# Patient Record
Sex: FEMALE | Race: BLACK OR AFRICAN AMERICAN | NOT HISPANIC OR LATINO | Employment: PART TIME | URBAN - METROPOLITAN AREA
[De-identification: names, ages, dates, MRNs, and addresses within clinical notes are randomized per-mention and may not be internally consistent; named-entity substitution may affect disease eponyms.]

---
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
## 2024-05-06 ENCOUNTER — HOSPITAL ENCOUNTER (EMERGENCY)
Facility: HOSPITAL | Age: 19
Discharge: HOME | End: 2024-05-06
Attending: EMERGENCY MEDICINE
Payer: MEDICAID

## 2024-05-06 VITALS
TEMPERATURE: 98 F | DIASTOLIC BLOOD PRESSURE: 63 MMHG | RESPIRATION RATE: 20 BRPM | WEIGHT: 136.13 LBS | HEART RATE: 88 BPM | SYSTOLIC BLOOD PRESSURE: 120 MMHG | OXYGEN SATURATION: 100 % | BODY MASS INDEX: 29.37 KG/M2 | HEIGHT: 57 IN

## 2024-05-06 DIAGNOSIS — R07.9 CHEST PAIN: ICD-10-CM

## 2024-05-06 LAB
ALBUMIN SERPL BCP-MCNC: 3.8 G/DL (ref 3.5–5.2)
ALP SERPL-CCNC: 91 U/L (ref 55–135)
ALT SERPL W/O P-5'-P-CCNC: 12 U/L (ref 10–44)
ANION GAP SERPL CALC-SCNC: 12 MMOL/L (ref 8–16)
AST SERPL-CCNC: 16 U/L (ref 10–40)
B-HCG UR QL: NEGATIVE
BACTERIA #/AREA URNS AUTO: NORMAL /HPF
BASOPHILS # BLD AUTO: 0.03 K/UL (ref 0–0.2)
BASOPHILS NFR BLD: 0.3 % (ref 0–1.9)
BILIRUB SERPL-MCNC: 0.4 MG/DL (ref 0.1–1)
BILIRUB UR QL STRIP: NEGATIVE
BNP SERPL-MCNC: <10 PG/ML (ref 0–99)
BUN SERPL-MCNC: 10 MG/DL (ref 6–20)
CALCIUM SERPL-MCNC: 9.4 MG/DL (ref 8.7–10.5)
CHLORIDE SERPL-SCNC: 105 MMOL/L (ref 95–110)
CLARITY UR REFRACT.AUTO: ABNORMAL
CO2 SERPL-SCNC: 21 MMOL/L (ref 23–29)
COLOR UR AUTO: YELLOW
CREAT SERPL-MCNC: 0.7 MG/DL (ref 0.5–1.4)
D DIMER PPP IA.FEU-MCNC: 0.36 MG/L FEU
DIFFERENTIAL METHOD BLD: ABNORMAL
EOSINOPHIL # BLD AUTO: 0.2 K/UL (ref 0–0.5)
EOSINOPHIL NFR BLD: 1.8 % (ref 0–8)
ERYTHROCYTE [DISTWIDTH] IN BLOOD BY AUTOMATED COUNT: 13.7 % (ref 11.5–14.5)
EST. GFR  (NO RACE VARIABLE): >60 ML/MIN/1.73 M^2
GLUCOSE SERPL-MCNC: 109 MG/DL (ref 70–110)
GLUCOSE UR QL STRIP: NEGATIVE
HCT VFR BLD AUTO: 38.4 % (ref 37–48.5)
HGB BLD-MCNC: 12 G/DL (ref 12–16)
HGB UR QL STRIP: ABNORMAL
IMM GRANULOCYTES # BLD AUTO: 0.06 K/UL (ref 0–0.04)
IMM GRANULOCYTES NFR BLD AUTO: 0.5 % (ref 0–0.5)
KETONES UR QL STRIP: NEGATIVE
LEUKOCYTE ESTERASE UR QL STRIP: NEGATIVE
LYMPHOCYTES # BLD AUTO: 2.1 K/UL (ref 1–4.8)
LYMPHOCYTES NFR BLD: 19.4 % (ref 18–48)
MCH RBC QN AUTO: 25.6 PG (ref 27–31)
MCHC RBC AUTO-ENTMCNC: 31.3 G/DL (ref 32–36)
MCV RBC AUTO: 82 FL (ref 82–98)
MICROSCOPIC COMMENT: NORMAL
MONOCYTES # BLD AUTO: 1.2 K/UL (ref 0.3–1)
MONOCYTES NFR BLD: 11.3 % (ref 4–15)
NEUTROPHILS # BLD AUTO: 7.3 K/UL (ref 1.8–7.7)
NEUTROPHILS NFR BLD: 66.7 % (ref 38–73)
NITRITE UR QL STRIP: NEGATIVE
NRBC BLD-RTO: 0 /100 WBC
PH UR STRIP: 6 [PH] (ref 5–8)
PLATELET # BLD AUTO: 366 K/UL (ref 150–450)
PMV BLD AUTO: 10.1 FL (ref 9.2–12.9)
POTASSIUM SERPL-SCNC: 4.1 MMOL/L (ref 3.5–5.1)
PROT SERPL-MCNC: 8 G/DL (ref 6–8.4)
PROT UR QL STRIP: NEGATIVE
RBC # BLD AUTO: 4.69 M/UL (ref 4–5.4)
RBC #/AREA URNS AUTO: 2 /HPF (ref 0–4)
SODIUM SERPL-SCNC: 138 MMOL/L (ref 136–145)
SP GR UR STRIP: 1.02 (ref 1–1.03)
TROPONIN I SERPL DL<=0.01 NG/ML-MCNC: <0.006 NG/ML (ref 0–0.03)
TSH SERPL DL<=0.005 MIU/L-ACNC: 3.48 UIU/ML (ref 0.4–4)
URN SPEC COLLECT METH UR: ABNORMAL
UROBILINOGEN UR STRIP-ACNC: NEGATIVE EU/DL
WBC # BLD AUTO: 10.97 K/UL (ref 3.9–12.7)
WBC #/AREA URNS AUTO: 3 /HPF (ref 0–5)

## 2024-05-06 PROCEDURE — 84443 ASSAY THYROID STIM HORMONE: CPT | Mod: ER | Performed by: EMERGENCY MEDICINE

## 2024-05-06 PROCEDURE — 80053 COMPREHEN METABOLIC PANEL: CPT | Mod: ER | Performed by: EMERGENCY MEDICINE

## 2024-05-06 PROCEDURE — 85379 FIBRIN DEGRADATION QUANT: CPT | Mod: ER | Performed by: EMERGENCY MEDICINE

## 2024-05-06 PROCEDURE — 85025 COMPLETE CBC W/AUTO DIFF WBC: CPT | Mod: ER | Performed by: EMERGENCY MEDICINE

## 2024-05-06 PROCEDURE — 83880 ASSAY OF NATRIURETIC PEPTIDE: CPT | Mod: ER | Performed by: EMERGENCY MEDICINE

## 2024-05-06 PROCEDURE — 81000 URINALYSIS NONAUTO W/SCOPE: CPT | Mod: ER | Performed by: EMERGENCY MEDICINE

## 2024-05-06 PROCEDURE — 84484 ASSAY OF TROPONIN QUANT: CPT | Mod: ER | Performed by: EMERGENCY MEDICINE

## 2024-05-06 PROCEDURE — 81025 URINE PREGNANCY TEST: CPT | Mod: ER | Performed by: EMERGENCY MEDICINE

## 2024-05-06 PROCEDURE — 93010 ELECTROCARDIOGRAM REPORT: CPT | Mod: ,,, | Performed by: STUDENT IN AN ORGANIZED HEALTH CARE EDUCATION/TRAINING PROGRAM

## 2024-05-06 PROCEDURE — 99285 EMERGENCY DEPT VISIT HI MDM: CPT | Mod: 25,ER

## 2024-05-06 PROCEDURE — 93005 ELECTROCARDIOGRAM TRACING: CPT | Mod: ER

## 2024-05-06 PROCEDURE — 94761 N-INVAS EAR/PLS OXIMETRY MLT: CPT | Mod: ER

## 2024-05-06 NOTE — ED PROVIDER NOTES
Emergency Medicine Provider Note - 5/6/2024       History     Chief Complaint   Patient presents with    Chest Pain     Approx. 7:45 this am lasting approx. 1 min sharp pain left side then resolved and then again at 7:50 last only sec. Has had this problem for several years. Just wants to know what is causing these pains       Allergies:  Review of patient's allergies indicates:   Allergen Reactions    Excedrin ib      Eye swelling    Penicillins Hives, Itching, Rash and Swelling        History of Present Illness   HPI    5/6/2024, 8:14 AM  The history is provided by the patient    Natalia Sue is a 19 y.o. female presenting to the ED for chest pain.  Patient reports that she has been having chest pain since elementary school.  She states that the chest pain happened today at 7:44 a.m..  She was in her recliner using her phone.  She noticed that the right side of her chest was throbbing.  She then had sharp stabbing pain on the left side of the chest.  It lasted about a minute, waxing and waning in intensity.  It was not associated with any diaphoresis, nausea, vomiting, or shortness of breath.  Patient denies any history of PE, DVT.  Patient got off of her birth control pill in February.  Patient denies any calf pain, calf swelling.  Patient denies any recent upper respiratory infections, sore throat, cough, fever.  It was not associated with any nausea, vomiting.      Arrival mode: Private Vehicle     PCP: Pati Canseco RN     Past Medical History:  Past Medical History:   Diagnosis Date    Bronchitis        Past Surgical History:  No past surgical history on file.      Family History:  No family history on file.    Social History:  Social History     Tobacco Use    Smoking status: Former     Types: Cigarettes     Passive exposure: Past    Smokeless tobacco: Never   Substance and Sexual Activity    Alcohol use: Yes    Drug use: Never    Sexual activity: Not on file        Review of Systems   Review of  Systems   Constitutional:  Negative for fever.   Respiratory:  Negative for cough and shortness of breath.    Cardiovascular:  Positive for chest pain and palpitations. Negative for leg swelling.   Gastrointestinal:  Negative for nausea and vomiting.   Genitourinary:  Negative for dysuria.   Musculoskeletal:  Negative for back pain.   Skin:  Negative for rash.   Neurological:  Negative for weakness.   Hematological:  Does not bruise/bleed easily.      Physical Exam     Initial Vitals [05/06/24 0810]   BP Pulse Resp Temp SpO2   124/70 92 20 97.5 °F (36.4 °C) 100 %      MAP       --          Physical Exam    Nursing Notes and Vital Signs Reviewed.  Constitutional: Patient is in no apparent distress. Well-developed and well-nourished.  Head: Atraumatic. Normocephalic.  Eyes: PERRL. EOM intact. Conjunctivae are not pale. No scleral icterus.  ENT: Mucous membranes are moist. Oropharynx is clear and symmetric.    Neck: Supple. Full ROM. No lymphadenopathy.  Cardiovascular: Regular rate. Regular rhythm. No murmurs, rubs, or gallops. Distal pulses are 2+ and symmetric.  Pulmonary/Chest: No respiratory distress. Clear to auscultation bilaterally. No wheezing or rales.  Abdominal: Soft and non-distended.  There is no tenderness.  No rebound, guarding, or rigidity. Good bowel sounds.  Genitourinary: No CVA tenderness  Musculoskeletal: Moves all extremities. No obvious deformities. No edema. No calf tenderness.  Skin: Warm and dry.  Neurological:  Alert, awake, and appropriate.  Normal speech.  No acute focal neurological deficits are appreciated.  Psychiatric: Normal affect. Good eye contact. Appropriate in content.     ED Course   ED Procedures:  Procedures    ED Vital Signs:  Vitals:    05/06/24 0810 05/06/24 0851 05/06/24 0926 05/06/24 0933   BP: 124/70 (!) 117/59  120/63   Pulse: 92 90  88   Resp: 20 20 20    Temp: 97.5 °F (36.4 °C)      TempSrc: Oral      SpO2: 100% 100%  100%   Weight: 61.7 kg (136 lb 2.1 oz)     "  Height: 4' 9" (1.448 m)          Abnormal Lab Results:  Labs Reviewed   CBC W/ AUTO DIFFERENTIAL - Abnormal; Notable for the following components:       Result Value    MCH 25.6 (*)     MCHC 31.3 (*)     Immature Grans (Abs) 0.06 (*)     Mono # 1.2 (*)     All other components within normal limits   COMPREHENSIVE METABOLIC PANEL - Abnormal; Notable for the following components:    CO2 21 (*)     All other components within normal limits   URINALYSIS, REFLEX TO URINE CULTURE - Abnormal; Notable for the following components:    Appearance, UA Hazy (*)     Occult Blood UA 2+ (*)     All other components within normal limits    Narrative:     Specimen Source->Urine   TROPONIN I   B-TYPE NATRIURETIC PEPTIDE   PREGNANCY TEST, URINE RAPID    Narrative:     Specimen Source->Urine   D DIMER, QUANTITATIVE   TSH   URINALYSIS MICROSCOPIC    Narrative:     Specimen Source->Urine        All Lab Results:  Results for orders placed or performed during the hospital encounter of 05/06/24   CBC auto differential   Result Value Ref Range    WBC 10.97 3.90 - 12.70 K/uL    RBC 4.69 4.00 - 5.40 M/uL    Hemoglobin 12.0 12.0 - 16.0 g/dL    Hematocrit 38.4 37.0 - 48.5 %    MCV 82 82 - 98 fL    MCH 25.6 (L) 27.0 - 31.0 pg    MCHC 31.3 (L) 32.0 - 36.0 g/dL    RDW 13.7 11.5 - 14.5 %    Platelets 366 150 - 450 K/uL    MPV 10.1 9.2 - 12.9 fL    Immature Granulocytes 0.5 0.0 - 0.5 %    Gran # (ANC) 7.3 1.8 - 7.7 K/uL    Immature Grans (Abs) 0.06 (H) 0.00 - 0.04 K/uL    Lymph # 2.1 1.0 - 4.8 K/uL    Mono # 1.2 (H) 0.3 - 1.0 K/uL    Eos # 0.2 0.0 - 0.5 K/uL    Baso # 0.03 0.00 - 0.20 K/uL    nRBC 0 0 /100 WBC    Gran % 66.7 38.0 - 73.0 %    Lymph % 19.4 18.0 - 48.0 %    Mono % 11.3 4.0 - 15.0 %    Eosinophil % 1.8 0.0 - 8.0 %    Basophil % 0.3 0.0 - 1.9 %    Differential Method Automated    Comprehensive metabolic panel   Result Value Ref Range    Sodium 138 136 - 145 mmol/L    Potassium 4.1 3.5 - 5.1 mmol/L    Chloride 105 95 - 110 mmol/L    CO2 " 21 (L) 23 - 29 mmol/L    Glucose 109 70 - 110 mg/dL    BUN 10 6 - 20 mg/dL    Creatinine 0.7 0.5 - 1.4 mg/dL    Calcium 9.4 8.7 - 10.5 mg/dL    Total Protein 8.0 6.0 - 8.4 g/dL    Albumin 3.8 3.5 - 5.2 g/dL    Total Bilirubin 0.4 0.1 - 1.0 mg/dL    Alkaline Phosphatase 91 55 - 135 U/L    AST 16 10 - 40 U/L    ALT 12 10 - 44 U/L    eGFR >60.0 >60 mL/min/1.73 m^2    Anion Gap 12 8 - 16 mmol/L   Troponin I #1   Result Value Ref Range    Troponin I <0.006 0.000 - 0.026 ng/mL   BNP   Result Value Ref Range    BNP <10 0 - 99 pg/mL   Urinalysis, Reflex to Urine Culture Urine, Clean Catch    Specimen: Urine   Result Value Ref Range    Specimen UA Urine, Clean Catch     Color, UA Yellow Yellow, Straw, Lali    Appearance, UA Hazy (A) Clear    pH, UA 6.0 5.0 - 8.0    Specific Gravity, UA 1.025 1.005 - 1.030    Protein, UA Negative Negative    Glucose, UA Negative Negative    Ketones, UA Negative Negative    Bilirubin (UA) Negative Negative    Occult Blood UA 2+ (A) Negative    Nitrite, UA Negative Negative    Urobilinogen, UA Negative <2.0 EU/dL    Leukocytes, UA Negative Negative   Rapid Pregnancy, Urine   Result Value Ref Range    Preg Test, Ur Negative    D-Dimer, Quantitative   Result Value Ref Range    D-Dimer 0.36 <0.50 mg/L FEU   TSH   Result Value Ref Range    TSH 3.482 0.400 - 4.000 uIU/mL   Urinalysis Microscopic   Result Value Ref Range    RBC, UA 2 0 - 4 /hpf    WBC, UA 3 0 - 5 /hpf    Bacteria Occasional None-Occ /hpf    Microscopic Comment SEE COMMENT        The EKG was ordered, reviewed, and independently interpreted by the ED provider:    ECG Results              EKG 12-lead (Preliminary result)  Result time 05/06/24 08:54:04      Wet Read by Deirdre Hendrix DO (05/06/24 08:54:04, [facility] - Emergency Dept, Emergency Medicine)    Rate of 81 beats per minute.  Sinus rhythm.  Sinus arrhythmia.  Nonspecific T-wave changes.  No STEMI.  No delta wave                                    Imaging Results:  Imaging  Results              X-Ray Chest PA And Lateral (Final result)  Result time 05/06/24 09:49:02      Final result by Farzad Mesa MD (05/06/24 09:49:02)                   Impression:      No acute findings.      Electronically signed by: Farzad Mesa MD  Date:    05/06/2024  Time:    09:49               Narrative:    EXAMINATION:  XR CHEST PA AND LATERAL    CLINICAL HISTORY  Acute left-sided chest pain.,    COMPARISON:  04/23/2023 x-ray.    FINDINGS:  The heart size is normal.  The lung fields are clear.  No acute cardiopulmonary infiltrate.                                       Radiology Procedure Done: AP & Lat CXR.  Interpretation: No pneumothorax.  No infiltrate.  Normal cardiac silhouette size          The Emergency Provider reviewed the vital signs and test results, which are outlined above.     ED Discussion   ED Medication(s):  Medications - No data to display    ED Course as of 05/06/24 1533   Mon May 06, 2024   0931 D-Dimer: 0.36 [LB]   0931 WBC: 10.97 [LB]   0931 Hemoglobin: 12.0 [LB]   0931 Hematocrit: 38.4 [LB]   1018 Patient updated on results of tests.  All questions answered.    Regarding CHEST PAIN, I advised the patient that chest pain can be caused by a range of conditions, from not serious to life-threatening such as: heart attack or a blood clot in your lungs, angina indicating poor blood flow to the heart; infection, inflammation, or a fracture in the bones or cartilage in chest wall; a digestive problem, such as acid reflux or a stomach ulcer; or even an anxiety attack.  Instructed patient to follow up with primary healthcare provider for reevaluation and possible diagnostic studies to find the actual cause of the chest pain. Patient was instructed to call 911 or go to the nearest emergency department if they develop any of the following signs of a heart attack: squeezing, pressure, or pain in the chest that lasts longer than five minutes or returns; discomfort or pain in the back,  neck, jaw, stomach, or arm; trouble breathing; nausea or vomiting; lightheadedness;  or a sudden cold sweat, especially with chest pain or trouble breathing.  Also return to the emergency department the chest discomfort gets worse (even with medicine); cough or vomit blood; have bowel movements that are black or bloody; cannot stop vomiting; or develop difficulty swallowing.     [LB]      ED Course User Index  [LB] Deirdre Hendrix, DO            10:18  Reassessment: Dr. Hendrix reassessed the pt.  The pt is resting comfortably and is NAD.  Pt states their sx have improved. Discussed test results, shared treatment plan, specific conditions for return, and the need for f/u.  Answered their questions at this time.  Pt understands and agrees to the plan.  The pt has remained hemodynamically stable through ED course and is stable for discharge.    I discussed with patient and/or family/caretaker that evaluation in the ED does not suggest any emergent or life threatening medical conditions requiring immediate intervention beyond what was provided in the ED, and I believe patient is safe for discharge.  Regardless, an unremarkable evaluation in the ED does not preclude the development or presence of a serious of life threatening condition. As such, patient was instructed to return immediately for any worsening or change in current symptoms.     MIPS Measures     Smoker? No     Hypertension: None     Medical Decision Making                 Medical Decision Making  Differential diagnosis:  Biliary colic, gastritis, musculoskeletal strain, pulmonary embolism, atypical ACS    EKG:  No STEMI.  Labs: UPT negative.  CBC normal.  Liver enzymes normal.  Troponin 0.006.  BNP less than 10.  D-dimer 0.36, TSH 3.482.  Imaging:  No acute abnormality: No pneumothorax            Amount and/or Complexity of Data Reviewed  Labs: ordered. Decision-making details documented in ED Course.  Radiology: ordered and independent interpretation  "performed. Decision-making details documented in ED Course.  ECG/medicine tests: ordered and independent interpretation performed. Decision-making details documented in ED Course.        Coding    Prescription Management: I performed a review of the patient's current Rx medication list as input by nursing staff.    Discharge Medication List as of 5/6/2024 10:26 AM        CONTINUE these medications which have NOT CHANGED    Details   baclofen (LIORESAL) 10 MG tablet Take 10 mg by mouth 3 (three) times daily., Historical Med      ketorolac (TORADOL) 10 mg tablet Take 10 mg by mouth every 6 (six) hours., Historical Med      montelukast (SINGULAIR) 10 mg tablet Take 10 mg by mouth every evening., Historical Med      ondansetron (ZOFRAN) 4 MG tablet Take 1 tablet (4 mg total) by mouth every 8 (eight) hours as needed., Starting Mon 10/2/2023, Normal      sertraline (ZOLOFT) 25 MG tablet Take 25 mg by mouth once daily., Historical Med      SPRINTEC, 28, 0.25-35 mg-mcg per tablet Take 1 tablet by mouth., Starting Tue 2/14/2023, Historical Med              Discussed case with:N/A      Portions of this note may have been created with voice recognition software. Occasional "wrong-word" or "sound-a-like" substitutions may have occurred due to the inherent limitations of voice recognition software. Please, read the note carefully and recognize, using context, where substitutions have occurred.          Clinical Impression       ICD-10-CM ICD-9-CM   1. Chest pain  R07.9 786.50        Disposition        Disposition: Discharge to home  Patient condition: Stable      ED Follow-up      Follow-up Information       Maldonado Clarke [facility] - In 2 days.    Why: Return to emergency department for difficulty breathing, severe chest pain, chest pain associated with getting clammy or sweaty, chest pain associated with vomiting, chest pain associated with shortness of breath, or worsening condition  Contact information:  [id number] " [facility] [id number]  [phone]                                      Deirdre Hendrix,   05/06/24 1535

## 2024-05-07 LAB
OHS QRS DURATION: 76 MS
OHS QTC CALCULATION: 394 MS

## 2024-06-05 NOTE — ADDENDUM NOTE
Addended by: NICOLA DAVISON on: 11/28/2022 05:28 PM     Modules accepted: Orders     MONA to bill Attending to bill

## 2024-09-04 ENCOUNTER — OFFICE VISIT (OUTPATIENT)
Dept: INTERNAL MEDICINE | Facility: CLINIC | Age: 19
End: 2024-09-04
Payer: MEDICAID

## 2024-09-04 VITALS
RESPIRATION RATE: 18 BRPM | WEIGHT: 142.88 LBS | TEMPERATURE: 98 F | HEART RATE: 106 BPM | OXYGEN SATURATION: 100 % | SYSTOLIC BLOOD PRESSURE: 114 MMHG | HEIGHT: 57 IN | BODY MASS INDEX: 30.83 KG/M2 | DIASTOLIC BLOOD PRESSURE: 76 MMHG

## 2024-09-04 DIAGNOSIS — R73.03 PREDIABETES: Primary | ICD-10-CM

## 2024-09-04 DIAGNOSIS — M41.9 SCOLIOSIS, UNSPECIFIED SCOLIOSIS TYPE, UNSPECIFIED SPINAL REGION: ICD-10-CM

## 2024-09-04 DIAGNOSIS — F32.A ANXIETY AND DEPRESSION: ICD-10-CM

## 2024-09-04 DIAGNOSIS — Z13.220 SCREENING FOR CHOLESTEROL LEVEL: ICD-10-CM

## 2024-09-04 DIAGNOSIS — Z11.8 SCREENING FOR CHLAMYDIAL DISEASE: ICD-10-CM

## 2024-09-04 DIAGNOSIS — R53.83 FATIGUE, UNSPECIFIED TYPE: ICD-10-CM

## 2024-09-04 DIAGNOSIS — F41.9 ANXIETY AND DEPRESSION: ICD-10-CM

## 2024-09-04 DIAGNOSIS — F33.2 SEVERE EPISODE OF RECURRENT MAJOR DEPRESSIVE DISORDER, WITHOUT PSYCHOTIC FEATURES: ICD-10-CM

## 2024-09-04 PROCEDURE — 99214 OFFICE O/P EST MOD 30 MIN: CPT | Mod: S$PBB,,, | Performed by: NURSE PRACTITIONER

## 2024-09-04 PROCEDURE — 3074F SYST BP LT 130 MM HG: CPT | Mod: CPTII,,, | Performed by: NURSE PRACTITIONER

## 2024-09-04 PROCEDURE — 1160F RVW MEDS BY RX/DR IN RCRD: CPT | Mod: CPTII,,, | Performed by: NURSE PRACTITIONER

## 2024-09-04 PROCEDURE — 99214 OFFICE O/P EST MOD 30 MIN: CPT | Mod: PBBFAC | Performed by: NURSE PRACTITIONER

## 2024-09-04 PROCEDURE — 1159F MED LIST DOCD IN RCRD: CPT | Mod: CPTII,,, | Performed by: NURSE PRACTITIONER

## 2024-09-04 PROCEDURE — 3044F HG A1C LEVEL LT 7.0%: CPT | Mod: CPTII,,, | Performed by: NURSE PRACTITIONER

## 2024-09-04 PROCEDURE — 3008F BODY MASS INDEX DOCD: CPT | Mod: CPTII,,, | Performed by: NURSE PRACTITIONER

## 2024-09-04 PROCEDURE — 99999 PR PBB SHADOW E&M-EST. PATIENT-LVL IV: CPT | Mod: PBBFAC,,, | Performed by: NURSE PRACTITIONER

## 2024-09-04 PROCEDURE — 3078F DIAST BP <80 MM HG: CPT | Mod: CPTII,,, | Performed by: NURSE PRACTITIONER

## 2024-09-04 PROCEDURE — G2211 COMPLEX E/M VISIT ADD ON: HCPCS | Mod: S$PBB,,, | Performed by: NURSE PRACTITIONER

## 2024-09-04 RX ORDER — BUPROPION HYDROCHLORIDE 100 MG/1
100 TABLET, EXTENDED RELEASE ORAL
COMMUNITY
Start: 2024-08-07

## 2024-09-05 ENCOUNTER — LAB VISIT (OUTPATIENT)
Dept: LAB | Facility: HOSPITAL | Age: 19
End: 2024-09-05
Payer: MEDICAID

## 2024-09-05 DIAGNOSIS — Z11.8 SCREENING FOR CHLAMYDIAL DISEASE: ICD-10-CM

## 2024-09-05 DIAGNOSIS — R73.03 PREDIABETES: ICD-10-CM

## 2024-09-05 DIAGNOSIS — B37.31 VAGINAL YEAST INFECTION: Primary | ICD-10-CM

## 2024-09-05 LAB
BACTERIA #/AREA URNS HPF: ABNORMAL /HPF
BILIRUB UR QL STRIP: NEGATIVE
CLARITY UR: CLEAR
COLOR UR: YELLOW
GLUCOSE UR QL STRIP: NEGATIVE
HGB UR QL STRIP: ABNORMAL
KETONES UR QL STRIP: NEGATIVE
LEUKOCYTE ESTERASE UR QL STRIP: NEGATIVE
MICROSCOPIC COMMENT: ABNORMAL
NITRITE UR QL STRIP: NEGATIVE
PH UR STRIP: 6 [PH] (ref 5–8)
PROT UR QL STRIP: NEGATIVE
RBC #/AREA URNS HPF: 13 /HPF (ref 0–4)
SP GR UR STRIP: 1.02 (ref 1–1.03)
SQUAMOUS #/AREA URNS HPF: 12 /HPF
URN SPEC COLLECT METH UR: ABNORMAL
WBC #/AREA URNS HPF: 3 /HPF (ref 0–5)
YEAST URNS QL MICRO: ABNORMAL

## 2024-09-05 PROCEDURE — 87591 N.GONORRHOEAE DNA AMP PROB: CPT | Performed by: NURSE PRACTITIONER

## 2024-09-05 PROCEDURE — 87491 CHLMYD TRACH DNA AMP PROBE: CPT | Performed by: NURSE PRACTITIONER

## 2024-09-05 PROCEDURE — 81000 URINALYSIS NONAUTO W/SCOPE: CPT | Performed by: NURSE PRACTITIONER

## 2024-09-05 RX ORDER — FLUCONAZOLE 150 MG/1
150 TABLET ORAL DAILY
Qty: 1 TABLET | Refills: 0 | Status: SHIPPED | OUTPATIENT
Start: 2024-09-05 | End: 2024-09-06

## 2024-09-05 NOTE — PROGRESS NOTES
Your urine has been sent to the lab for analysis.  We are going to wait for the culture to come back being that your were asymptomatic.  I will be send Diflucan which treats yeast infection to your pharmacy.
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
## 2024-09-07 LAB
C TRACH DNA SPEC QL NAA+PROBE: NOT DETECTED
N GONORRHOEA DNA SPEC QL NAA+PROBE: NOT DETECTED

## 2024-09-12 ENCOUNTER — LAB VISIT (OUTPATIENT)
Dept: LAB | Facility: HOSPITAL | Age: 19
End: 2024-09-12
Attending: NURSE PRACTITIONER
Payer: MEDICAID

## 2024-09-12 DIAGNOSIS — R53.83 FATIGUE, UNSPECIFIED TYPE: ICD-10-CM

## 2024-09-12 DIAGNOSIS — R53.83 FATIGUE, UNSPECIFIED TYPE: Primary | ICD-10-CM

## 2024-09-12 LAB
BASOPHILS # BLD AUTO: 0.04 K/UL (ref 0–0.2)
BASOPHILS NFR BLD: 0.3 % (ref 0–1.9)
DIFFERENTIAL METHOD BLD: ABNORMAL
EOSINOPHIL # BLD AUTO: 0.2 K/UL (ref 0–0.5)
EOSINOPHIL NFR BLD: 1.3 % (ref 0–8)
ERYTHROCYTE [DISTWIDTH] IN BLOOD BY AUTOMATED COUNT: 14 % (ref 11.5–14.5)
FERRITIN SERPL-MCNC: 23 NG/ML (ref 20–300)
HCT VFR BLD AUTO: 35.3 % (ref 37–48.5)
HGB BLD-MCNC: 11 G/DL (ref 12–16)
IMM GRANULOCYTES # BLD AUTO: 0.06 K/UL (ref 0–0.04)
IMM GRANULOCYTES NFR BLD AUTO: 0.4 % (ref 0–0.5)
LYMPHOCYTES # BLD AUTO: 3.7 K/UL (ref 1–4.8)
LYMPHOCYTES NFR BLD: 27.9 % (ref 18–48)
MCH RBC QN AUTO: 24.9 PG (ref 27–31)
MCHC RBC AUTO-ENTMCNC: 31.2 G/DL (ref 32–36)
MCV RBC AUTO: 80 FL (ref 82–98)
MONOCYTES # BLD AUTO: 1.1 K/UL (ref 0.3–1)
MONOCYTES NFR BLD: 8.1 % (ref 4–15)
NEUTROPHILS # BLD AUTO: 8.3 K/UL (ref 1.8–7.7)
NEUTROPHILS NFR BLD: 62 % (ref 38–73)
NRBC BLD-RTO: 0 /100 WBC
PLATELET # BLD AUTO: 376 K/UL (ref 150–450)
PMV BLD AUTO: 10.8 FL (ref 9.2–12.9)
RBC # BLD AUTO: 4.42 M/UL (ref 4–5.4)
RETICS/RBC NFR AUTO: 1.5 % (ref 0.5–2.5)
VIT B12 SERPL-MCNC: 679 PG/ML (ref 210–950)
WBC # BLD AUTO: 13.39 K/UL (ref 3.9–12.7)

## 2024-09-12 PROCEDURE — 36415 COLL VENOUS BLD VENIPUNCTURE: CPT | Performed by: NURSE PRACTITIONER

## 2024-09-12 PROCEDURE — 85025 COMPLETE CBC W/AUTO DIFF WBC: CPT | Performed by: NURSE PRACTITIONER

## 2024-09-12 PROCEDURE — 85045 AUTOMATED RETICULOCYTE COUNT: CPT | Performed by: NURSE PRACTITIONER

## 2024-09-12 PROCEDURE — 82607 VITAMIN B-12: CPT | Performed by: NURSE PRACTITIONER

## 2024-09-12 PROCEDURE — 82728 ASSAY OF FERRITIN: CPT | Performed by: NURSE PRACTITIONER

## 2024-09-18 ENCOUNTER — OFFICE VISIT (OUTPATIENT)
Dept: INTERNAL MEDICINE | Facility: CLINIC | Age: 19
End: 2024-09-18
Payer: MEDICAID

## 2024-09-18 VITALS
HEIGHT: 57 IN | SYSTOLIC BLOOD PRESSURE: 118 MMHG | WEIGHT: 142.88 LBS | BODY MASS INDEX: 30.83 KG/M2 | OXYGEN SATURATION: 97 % | HEART RATE: 88 BPM | DIASTOLIC BLOOD PRESSURE: 70 MMHG | TEMPERATURE: 98 F

## 2024-09-18 DIAGNOSIS — F32.A ANXIETY AND DEPRESSION: Primary | ICD-10-CM

## 2024-09-18 DIAGNOSIS — L21.9 SEBORRHEIC DERMATITIS OF SCALP: ICD-10-CM

## 2024-09-18 DIAGNOSIS — H60.543 ECZEMA OF EXTERNAL EAR, BILATERAL: ICD-10-CM

## 2024-09-18 DIAGNOSIS — R39.15 URINARY URGENCY: ICD-10-CM

## 2024-09-18 DIAGNOSIS — N39.41 URGE INCONTINENCE OF URINE: ICD-10-CM

## 2024-09-18 DIAGNOSIS — F41.9 ANXIETY AND DEPRESSION: Primary | ICD-10-CM

## 2024-09-18 DIAGNOSIS — M79.603 UPPER EXTREMITY PAIN, DIFFUSE, UNSPECIFIED LATERALITY: ICD-10-CM

## 2024-09-18 LAB
BACTERIA #/AREA URNS HPF: ABNORMAL /HPF
BILIRUB UR QL STRIP: NEGATIVE
CLARITY UR: ABNORMAL
COLOR UR: YELLOW
GLUCOSE UR QL STRIP: NEGATIVE
HGB UR QL STRIP: ABNORMAL
KETONES UR QL STRIP: NEGATIVE
LEUKOCYTE ESTERASE UR QL STRIP: NEGATIVE
MICROSCOPIC COMMENT: ABNORMAL
NITRITE UR QL STRIP: NEGATIVE
PH UR STRIP: 6 [PH] (ref 5–8)
PROT UR QL STRIP: NEGATIVE
RBC #/AREA URNS HPF: >100 /HPF (ref 0–4)
SP GR UR STRIP: >=1.03 (ref 1–1.03)
SQUAMOUS #/AREA URNS HPF: 1 /HPF
URN SPEC COLLECT METH UR: ABNORMAL
WBC #/AREA URNS HPF: 2 /HPF (ref 0–5)

## 2024-09-18 PROCEDURE — 3078F DIAST BP <80 MM HG: CPT | Mod: CPTII,,, | Performed by: NURSE PRACTITIONER

## 2024-09-18 PROCEDURE — 99213 OFFICE O/P EST LOW 20 MIN: CPT | Mod: PBBFAC | Performed by: NURSE PRACTITIONER

## 2024-09-18 PROCEDURE — 3074F SYST BP LT 130 MM HG: CPT | Mod: CPTII,,, | Performed by: NURSE PRACTITIONER

## 2024-09-18 PROCEDURE — 99999 PR PBB SHADOW E&M-EST. PATIENT-LVL III: CPT | Mod: PBBFAC,,, | Performed by: NURSE PRACTITIONER

## 2024-09-18 PROCEDURE — 3008F BODY MASS INDEX DOCD: CPT | Mod: CPTII,,, | Performed by: NURSE PRACTITIONER

## 2024-09-18 PROCEDURE — G2211 COMPLEX E/M VISIT ADD ON: HCPCS | Mod: S$PBB,,, | Performed by: NURSE PRACTITIONER

## 2024-09-18 PROCEDURE — 99214 OFFICE O/P EST MOD 30 MIN: CPT | Mod: S$PBB,,, | Performed by: NURSE PRACTITIONER

## 2024-09-18 PROCEDURE — 1160F RVW MEDS BY RX/DR IN RCRD: CPT | Mod: CPTII,,, | Performed by: NURSE PRACTITIONER

## 2024-09-18 PROCEDURE — 3044F HG A1C LEVEL LT 7.0%: CPT | Mod: CPTII,,, | Performed by: NURSE PRACTITIONER

## 2024-09-18 PROCEDURE — 81000 URINALYSIS NONAUTO W/SCOPE: CPT | Performed by: NURSE PRACTITIONER

## 2024-09-18 PROCEDURE — 1159F MED LIST DOCD IN RCRD: CPT | Mod: CPTII,,, | Performed by: NURSE PRACTITIONER

## 2024-09-18 RX ORDER — KETOCONAZOLE 20 MG/ML
SHAMPOO, SUSPENSION TOPICAL
Qty: 120 ML | Refills: 5 | Status: SHIPPED | OUTPATIENT
Start: 2024-09-19

## 2024-09-18 RX ORDER — BUPROPION HYDROCHLORIDE 300 MG/1
300 TABLET ORAL DAILY
Qty: 90 TABLET | Refills: 1 | Status: SHIPPED | OUTPATIENT
Start: 2024-09-18

## 2024-09-18 RX ORDER — FERROUS SULFATE 325(65) MG
325 TABLET ORAL DAILY
COMMUNITY

## 2024-09-18 RX ORDER — TRIAMCINOLONE ACETONIDE 1 MG/G
OINTMENT TOPICAL 2 TIMES DAILY
Qty: 30 G | Refills: 3 | Status: SHIPPED | OUTPATIENT
Start: 2024-09-18

## 2024-09-18 NOTE — PROGRESS NOTES
"  Subjective:      Patient ID: Natalia Sue is a 19 y.o. female.    Chief Complaint: Annual Exam    History of Present Illness    CHIEF COMPLAINT:  Natalia presents today for follow up.    ANXIETY AND DEPRESSION  She reports she is "doing alright".  Denies any suicidal or homicidal ideations. She reports she is taking her Wellbutrin daily as promised.  Does not take her sertraline (Zoloft).      SKIN ISSUES:  She reports experiencing ear and facial dryness. She notes ear eczema and mentions that the facial dryness may be related to excessive soda consumption. The dryness is not as severe as before but still noticeable. She has been using a prescribed ointment for her ear eczema but reports it has not been effective in alleviating symptoms.    URINARY CONCERNS:  She reports a history of bedwetting, which occurred frequently during childhood and recurred in 2022, though less frequently. She expresses embarrassment about recent occurrences. She describes experiencing urinary urgency, particularly during patient interactions, where she often needs to excuse herself to use the restroom. She reports difficulty holding urine and states that the urge comes on suddenly without warning. She denies experiencing burning during urination or blood in her urine.    MUSCULOSKELETAL PAIN:  She reports experiencing center back pain, denying left or right low back pain. She mentions discomfort in her neck area, describing it as not painful but "aggravating," causing her to frequently adjust her neck. 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 She has attempted various self-management techniques, including applying pressure to the area, but reports that these efforts have not provided relief.    MEDICATIONS:  She reports non-adherence to several medications. She is not taking Singulair or birth control. She has " "discontinued Zoloft, with the last dose taken approximately one year ago. However, she has recently started taking [place] Thyroid, beginning on Sunday of the current week.    MEDICAL HISTORY:  She reports being diagnosed with prediabetes. She acknowledges the need for follow-up in three months to address her prediabetes status.      ROS:  General: -fever, -chills, -fatigue, -weight gain, -weight loss  Eyes: -vision changes, -redness, -discharge  ENT: -ear pain, -nasal congestion, -sore throat  Cardiovascular: -chest pain, -palpitations, -lower extremity edema  Respiratory: -cough, -shortness of breath  Gastrointestinal: -abdominal pain, -nausea, -vomiting, -diarrhea, -constipation, -blood in stool  Genitourinary: -dysuria, -hematuria, -frequency, +urgency  Musculoskeletal: -joint pain, -muscle pain, +back pain  Skin: -rash, -lesion, +dry skin  Neurological: -headache, -dizziness, -numbness, -tingling  Psychiatric: -anxiety, -depression, -sleep difficulty          There is no problem list on file for this patient.        Current Outpatient Medications:     ferrous sulfate (FEOSOL) 325 mg (65 mg iron) Tab tablet, Take 325 mg by mouth once daily., Disp: , Rfl:     buPROPion (WELLBUTRIN XL) 300 MG 24 hr tablet, Take 1 tablet (300 mg total) by mouth once daily., Disp: 90 tablet, Rfl: 1    [START ON 9/19/2024] ketoconazole (NIZORAL) 2 % shampoo, Apply topically twice a week., Disp: 120 mL, Rfl: 5    triamcinolone acetonide 0.1% (KENALOG) 0.1 % ointment, Apply topically 2 (two) times daily., Disp: 30 g, Rfl: 3      Objective:   /70 (BP Location: Right arm, Patient Position: Sitting, BP Method: Medium (Manual))   Pulse 88   Temp 98 °F (36.7 °C) (Tympanic)   Ht 4' 9" (1.448 m)   Wt 64.8 kg (142 lb 13.7 oz)   LMP 09/15/2024   SpO2 97%   BMI 30.91 kg/m²     Physical Exam    General: In no acute distress.  Head: Normocephalic. Non traumatic.  Neck: Supple. No masses. No thyromegaly. No bruits.  Chest: Lungs clear. " "No rales. No rhonchi. No wheezes.  Heart: RRR. No murmurs. No rubs. No gallops.  Abdomen: Soft. No tenderness. No masses. BS normal.  Extremities: Warm. Well perfused. No upper extremity edema. No lower extremity edema. FROM. No deformities. No joint erythema.  Neuro: No focal deficits appreciated. Good muscle tone. Normal response to visual stimuli. Normal response to auditory stimuli.  Skin: Normal. No rashes. No lesions noted.  Vitals: Blood pressure normal.          Assessment:     1. Anxiety and depression    2. Eczema of external ear, bilateral    3. Urinary urgency    4. Urge incontinence of urine    5. Upper extremity pain, diffuse, unspecified laterality    6. Seborrheic dermatitis of scalp      Plan:   Assessment & Plan    Suspected UTI based on reported urinary incontinence and frequency  Considered ear eczema as cause of ear dryness  Evaluated neck discomfort, potentially related to carrying heavy bag  Assessed prediabetes status    URINARY INCONTINENCE:  - Explained Kegel exercises for urinary incontinence: "Squeeze and hold.  - Only move that muscle."  - Discussed importance of not holding urine and going to restroom when first urge to void occurs.  - Shynia to perform Kegel exercises.  - Shynia to avoid holding urine when feeling the urge to urinate.    ANXIETY AND DEPRESSION  - Stable   - Will increase dosage per patient's request    NECK DISCOMFORT:  - Shynia to use neck pillow while sleeping.  - Started anti-inflammatory medication for neck discomfort.  - Started Tylenol for neck discomfort.    EAR ECZEMA:  - Started ointment for ear eczema.    MEDICATIONS/SUPPLEMENTS:  - Restarted Singulair.  - Restarted Zoloft.    THYROID DISORDER:  - Continued [place] thyroid.    LABS:  - Urinalysis ordered.    PREDIABETES:  - Follow up in 3 months for prediabetes status check.    FOLLOW UP:  - Follow up in 6 months.          No follow-ups on file.    "

## 2024-09-22 ENCOUNTER — OFFICE VISIT (OUTPATIENT)
Dept: URGENT CARE | Facility: CLINIC | Age: 19
End: 2024-09-22
Payer: MEDICAID

## 2024-09-22 VITALS
OXYGEN SATURATION: 100 % | SYSTOLIC BLOOD PRESSURE: 118 MMHG | HEART RATE: 106 BPM | BODY MASS INDEX: 30.73 KG/M2 | TEMPERATURE: 99 F | WEIGHT: 142.44 LBS | RESPIRATION RATE: 18 BRPM | HEIGHT: 57 IN | DIASTOLIC BLOOD PRESSURE: 69 MMHG

## 2024-09-22 DIAGNOSIS — N30.91 CYSTITIS WITH HEMATURIA: ICD-10-CM

## 2024-09-22 DIAGNOSIS — N76.0 ACUTE VAGINITIS: Primary | ICD-10-CM

## 2024-09-22 LAB
B-HCG UR QL: NEGATIVE
BILIRUBIN, UA POC OHS: NEGATIVE
BLOOD, UA POC OHS: ABNORMAL
CLARITY, UA POC OHS: ABNORMAL
COLOR, UA POC OHS: YELLOW
CTP QC/QA: YES
GLUCOSE, UA POC OHS: NEGATIVE
KETONES, UA POC OHS: NEGATIVE
LEUKOCYTES, UA POC OHS: ABNORMAL
NITRITE, UA POC OHS: NEGATIVE
PH, UA POC OHS: 8.5
PROTEIN, UA POC OHS: NEGATIVE
SPECIFIC GRAVITY, UA POC OHS: 1.02
UROBILINOGEN, UA POC OHS: 0.2

## 2024-09-22 PROCEDURE — 87591 N.GONORRHOEAE DNA AMP PROB: CPT | Performed by: PHYSICIAN ASSISTANT

## 2024-09-22 PROCEDURE — 81025 URINE PREGNANCY TEST: CPT | Mod: S$GLB,,, | Performed by: PHYSICIAN ASSISTANT

## 2024-09-22 PROCEDURE — 87491 CHLMYD TRACH DNA AMP PROBE: CPT | Performed by: PHYSICIAN ASSISTANT

## 2024-09-22 PROCEDURE — 81003 URINALYSIS AUTO W/O SCOPE: CPT | Mod: QW,S$GLB,, | Performed by: PHYSICIAN ASSISTANT

## 2024-09-22 PROCEDURE — 81514 NFCT DS BV&VAGINITIS DNA ALG: CPT | Performed by: PHYSICIAN ASSISTANT

## 2024-09-22 PROCEDURE — 99214 OFFICE O/P EST MOD 30 MIN: CPT | Mod: S$GLB,,, | Performed by: PHYSICIAN ASSISTANT

## 2024-09-22 RX ORDER — NITROFURANTOIN 25; 75 MG/1; MG/1
100 CAPSULE ORAL 2 TIMES DAILY
Qty: 10 CAPSULE | Refills: 0 | Status: SHIPPED | OUTPATIENT
Start: 2024-09-22 | End: 2024-09-27

## 2024-09-22 RX ORDER — METRONIDAZOLE 500 MG/1
500 TABLET ORAL EVERY 12 HOURS
Qty: 14 TABLET | Refills: 0 | Status: SHIPPED | OUTPATIENT
Start: 2024-09-22 | End: 2024-09-29

## 2024-09-22 NOTE — PROGRESS NOTES
"Subjective:      Patient ID: Natalia Sue is a 19 y.o. female.    Vitals:  height is 4' 9" (1.448 m) and weight is 64.6 kg (142 lb 6.7 oz). Her oral temperature is 98.7 °F (37.1 °C). Her blood pressure is 118/69 and her pulse is 106. Her respiration is 18 and oxygen saturation is 100%.     Chief Complaint: Vaginal Discharge (Vaginal smell )    Patient presents with smell of possible BV.  Symptoms started today .  C/o white to yellow d/c, strong vaginal odor. Denies itching or rash. Denies painful urination but is having some urgency. No N/V, fever, pelvic or flank pain     Vaginal Discharge  The patient's primary symptoms include a genital odor and vaginal discharge. The patient's pertinent negatives include no genital itching, genital lesions, genital rash, missed menses, pelvic pain or vaginal bleeding. This is a new problem. The current episode started today. The problem occurs constantly. The problem has been unchanged. The patient is experiencing no pain. She is not pregnant. Associated symptoms include chills, painful intercourse and urgency. Pertinent negatives include no abdominal pain, anorexia, constipation, diarrhea, discolored urine, dysuria, fever, flank pain, frequency, headaches, hematuria, joint pain, joint swelling, nausea, rash, sore throat or vomiting. The vaginal discharge was milky, thin and yellow. She has not been passing clots. She has not been passing tissue. The symptoms are aggravated by intercourse. She has tried nothing for the symptoms. The treatment provided no relief. She is sexually active (same sex). No, her partner does not have an STD. She uses nothing for contraception. Her menstrual history has been regular. Her past medical history is significant for an STD. There is no history of an abdominal surgery, a  section, an ectopic pregnancy, endometriosis, a gynecological surgery, herpes simplex, menorrhagia, metrorrhagia, miscarriage, ovarian cysts, perineal abscess, PID, " a terminated pregnancy or vaginosis.       Constitution: Positive for chills. Negative for fever.   HENT:  Negative for sore throat.    Gastrointestinal:  Negative for abdominal pain, history of abdominal surgery, nausea, vomiting, constipation and diarrhea.   Genitourinary:  Positive for urgency, vaginal discharge and vaginal odor. Negative for dysuria, frequency, urine decreased, flank pain, bladder incontinence, bed wetting, hematuria, painful menstruation, irregular menstruation, missed menses, ovarian cysts, vaginal pain, vaginal bleeding, painful intercourse, genital sore and pelvic pain.   Skin:  Negative for rash.   Neurological:  Negative for headaches.      Objective:     Physical Exam   Constitutional: She is oriented to person, place, and time. She appears well-developed.   HENT:   Head: Normocephalic and atraumatic.   Ears:   Right Ear: External ear normal.   Left Ear: External ear normal.   Nose: Nose normal. No nasal deformity. No epistaxis.   Mouth/Throat: Oropharynx is clear and moist and mucous membranes are normal. Mucous membranes are moist. Oropharynx is clear.   Eyes: Lids are normal. Pupils are equal, round, and reactive to light. Extraocular movement intact   Neck: Trachea normal and phonation normal. Neck supple.   Cardiovascular: Normal pulses. Tachycardia present.   Pulmonary/Chest: Effort normal.   Abdominal: Normal appearance and bowel sounds are normal. She exhibits no distension. Soft. There is no abdominal tenderness. There is no rebound, no guarding, no left CVA tenderness and no right CVA tenderness.   Genitourinary:         Comments: Deferred      Neurological: She is alert and oriented to person, place, and time.   Skin: Skin is warm, dry and intact.   Psychiatric: Her speech is normal and behavior is normal.   Nursing note and vitals reviewed.    Results for orders placed or performed in visit on 09/22/24   POCT Urinalysis(Instrument)   Result Value Ref Range    Color, POC UA  Yellow Yellow, Straw, Colorless    Clarity, POC UA Cloudy (A) Clear    Glucose, POC UA Negative Negative    Bilirubin, POC UA Negative Negative    Ketones, POC UA Negative Negative    Spec Grav POC UA 1.025 1.005 - 1.030    Blood, POC UA Trace-intact (A) Negative    pH, POC UA 8.5 5.0 - 8.0    Protein, POC UA Negative Negative    Urobilinogen, POC UA 0.2 <=1.0    Nitrite, POC UA Negative Negative    WBC, POC UA Trace (A) Negative   POCT urine pregnancy   Result Value Ref Range    POC Preg Test, Ur Negative Negative     Acceptable Yes        Assessment:     1. Acute vaginitis    2. Cystitis with hematuria        Plan:       Acute vaginitis  -     Vaginosis Screen by DNA Probe; Future; Expected date: 09/22/2024  -     C. trachomatis/N. gonorrhoeae by AMP DNA [facility]; Vagina  -     POCT Urinalysis(Instrument)  -     POCT urine pregnancy  -     metroNIDAZOLE (FLAGYL) 500 MG tablet; Take 1 tablet (500 mg total) by mouth every 12 (twelve) hours. for 7 days  Dispense: 14 tablet; Refill: 0    Cystitis with hematuria  -     POCT Urinalysis(Instrument)  -     POCT urine pregnancy  -     nitrofurantoin, macrocrystal-monohydrate, (MACROBID) 100 MG capsule; Take 1 capsule (100 mg total) by mouth 2 (two) times daily. for 5 days  Dispense: 10 capsule; Refill: 0    Caroline Fusilier PA-C [facility]       Patient Instructions   We will call you when we receive swab results - usually about 3-4 days to result. You can take metronidazole for possible bacterial vaginosis. Take macrobid for urinary tract infection and drink plenty of fluids.  We will adjust therapy if necessary depending on the swab results.     Avoid bubble baths, scented soaps, douching or feminine sprays. Avoid tight fitting clothes and wear cotton underwear.           Medical Decision Making:   Urgent Care Management:  Tx with flagyl for presumptive BV. Also blood and pyuria on UA - rx macrobid.

## 2024-09-23 ENCOUNTER — HOSPITAL ENCOUNTER (OUTPATIENT)
Dept: CARDIOLOGY | Facility: HOSPITAL | Age: 19
Discharge: HOME | End: 2024-09-23
Payer: MEDICAID

## 2024-09-23 ENCOUNTER — OFFICE VISIT (OUTPATIENT)
Dept: INTERNAL MEDICINE | Facility: CLINIC | Age: 19
End: 2024-09-23
Payer: MEDICAID

## 2024-09-23 VITALS
TEMPERATURE: 99 F | HEART RATE: 75 BPM | SYSTOLIC BLOOD PRESSURE: 124 MMHG | WEIGHT: 143.5 LBS | HEIGHT: 57 IN | DIASTOLIC BLOOD PRESSURE: 84 MMHG | BODY MASS INDEX: 30.96 KG/M2 | OXYGEN SATURATION: 100 %

## 2024-09-23 DIAGNOSIS — R07.9 CHEST PAIN, UNSPECIFIED TYPE: ICD-10-CM

## 2024-09-23 DIAGNOSIS — N76.0 ACUTE VAGINITIS: ICD-10-CM

## 2024-09-23 DIAGNOSIS — Z87.898 HISTORY OF CHEST PAIN: ICD-10-CM

## 2024-09-23 DIAGNOSIS — R07.9 CHEST PAIN, UNSPECIFIED TYPE: Primary | ICD-10-CM

## 2024-09-23 PROCEDURE — 99214 OFFICE O/P EST MOD 30 MIN: CPT | Mod: PBBFAC,25 | Performed by: NURSE PRACTITIONER

## 2024-09-23 PROCEDURE — 3044F HG A1C LEVEL LT 7.0%: CPT | Mod: CPTII,,, | Performed by: NURSE PRACTITIONER

## 2024-09-23 PROCEDURE — 93005 ELECTROCARDIOGRAM TRACING: CPT

## 2024-09-23 PROCEDURE — 93010 ELECTROCARDIOGRAM REPORT: CPT | Mod: ,,, | Performed by: INTERNAL MEDICINE

## 2024-09-23 PROCEDURE — 3079F DIAST BP 80-89 MM HG: CPT | Mod: CPTII,,, | Performed by: NURSE PRACTITIONER

## 2024-09-23 PROCEDURE — 1160F RVW MEDS BY RX/DR IN RCRD: CPT | Mod: CPTII,,, | Performed by: NURSE PRACTITIONER

## 2024-09-23 PROCEDURE — 3074F SYST BP LT 130 MM HG: CPT | Mod: CPTII,,, | Performed by: NURSE PRACTITIONER

## 2024-09-23 PROCEDURE — 99212 OFFICE O/P EST SF 10 MIN: CPT | Mod: S$PBB,,, | Performed by: NURSE PRACTITIONER

## 2024-09-23 PROCEDURE — 99999 PR PBB SHADOW E&M-EST. PATIENT-LVL IV: CPT | Mod: PBBFAC,,, | Performed by: NURSE PRACTITIONER

## 2024-09-23 PROCEDURE — 3008F BODY MASS INDEX DOCD: CPT | Mod: CPTII,,, | Performed by: NURSE PRACTITIONER

## 2024-09-23 PROCEDURE — G2211 COMPLEX E/M VISIT ADD ON: HCPCS | Mod: S$PBB,,, | Performed by: NURSE PRACTITIONER

## 2024-09-23 PROCEDURE — 1159F MED LIST DOCD IN RCRD: CPT | Mod: CPTII,,, | Performed by: NURSE PRACTITIONER

## 2024-09-23 NOTE — PROGRESS NOTES
"  Subjective:      Patient ID: Natalia Sue is a 19 y.o. female.    Chief Complaint: Follow-up    History of Present Illness    CHIEF COMPLAINT:  Natalia presents today for chest pain and vaginal discharge.    CHEST PAIN:  She reports intermittent chest pain starting Saturday at 2:00 PM, lasting until 9:00 PM. Pain was initially 5/10 in severity, increasing to 10/10 while walking, localized to the left side of the chest. She denies associated symptoms such as nausea, vomiting, diarrhea, sweats, chills, jaw pain, or back pain. Tylenol provided some relief. The pain occurred even while sitting still. She reports a history of chest pain since elementary school.    RECENT MEDICAL CARE:  She visited the Emergency Room at [facility] due to chest pain. EKG, labs, and chest XR were performed, with all results reported as normal. She was advised to follow up with her PCP and see a cardiologist.    VAGINAL DISCHARGE:  She reports white and yellow vaginal discharge with a fishy odor, denying associated itching or burning. She recalls having bacterial vaginosis (BV) in September last year and suspects a recurrence. She visited an urgent care facility on Sunday due to these symptoms. A urine sample and vaginal swab were taken, and she was prescribed medication for suspected BV and UTI.    MEDICAL HISTORY:  She reports a history of anxiety and depression.    ALLERGIES:  She reports eye swelling after taking naproxen, which may be a newly developed allergy. She also has an allergy to ibuprofen. She is aware that she can only take Tylenol for pain relief and should avoid all NSAIDs.    MEDICATIONS:  She can only take Tylenol and another medication she refers to as "dog" without adverse reactions.    DIET:  She recently consumed gumbo containing shrimp, chicken, and gizzards at her mother's house.      ROS:  General: -fever, -chills, -fatigue, -weight gain, -weight loss  Eyes: -vision changes, -redness, " "-discharge  ENT: -ear pain, -nasal congestion, -sore throat  Cardiovascular: +chest pain, -palpitations, -lower extremity edema  Respiratory: -cough, -shortness of breath  Gastrointestinal: -abdominal pain, -nausea, -vomiting, -diarrhea, -constipation, -blood in stool  Genitourinary: -dysuria, -hematuria, -frequency  Musculoskeletal: -joint pain, -muscle pain  Skin: -rash, -lesion, -itching  Neurological: -headache, -dizziness, -numbness, -tingling  Psychiatric: +anxiety, +depression, -sleep difficulty          There is no problem list on file for this patient.        Current Outpatient Medications:     buPROPion (WELLBUTRIN XL) 300 MG 24 hr tablet, Take 1 tablet (300 mg total) by mouth once daily., Disp: 90 tablet, Rfl: 1    ferrous sulfate (FEOSOL) 325 mg (65 mg iron) Tab tablet, Take 325 mg by mouth once daily., Disp: , Rfl:     ketoconazole (NIZORAL) 2 % shampoo, Apply topically twice a week., Disp: 120 mL, Rfl: 5    metroNIDAZOLE (FLAGYL) 500 MG tablet, Take 1 tablet (500 mg total) by mouth every 12 (twelve) hours. for 7 days, Disp: 14 tablet, Rfl: 0    nitrofurantoin, macrocrystal-monohydrate, (MACROBID) 100 MG capsule, Take 1 capsule (100 mg total) by mouth 2 (two) times daily. for 5 days, Disp: 10 capsule, Rfl: 0    triamcinolone acetonide 0.1% (KENALOG) 0.1 % ointment, Apply topically 2 (two) times daily., Disp: 30 g, Rfl: 3      Objective:   /84 (BP Location: Right arm, Patient Position: Sitting, BP Method: Medium (Manual))   Pulse 75   Temp 98.6 °F (37 °C) (Tympanic)   Ht 4' 9" (1.448 m)   Wt 65.1 kg (143 lb 8.3 oz)   LMP 09/15/2024 (Exact Date)   SpO2 100%   BMI 31.06 kg/m²     Physical Exam             Physical Exam  Vitals and nursing note reviewed.   Constitutional:       General: She is awake. She is not in acute distress.     Appearance: Normal appearance. She is well-developed and well-groomed. She is not ill-appearing, toxic-appearing or diaphoretic.   HENT:      Head: Normocephalic " and atraumatic.      Right Ear: External ear normal.      Left Ear: External ear normal.      Nose: No congestion or rhinorrhea.      Mouth/Throat:      Pharynx: No oropharyngeal exudate or posterior oropharyngeal erythema.   Eyes:      Conjunctiva/sclera: Conjunctivae normal.      Pupils: Pupils are equal, round, and reactive to light.   Cardiovascular:      Rate and Rhythm: Normal rate and regular rhythm.      Heart sounds: Normal heart sounds. No murmur heard.  Pulmonary:      Effort: Pulmonary effort is normal. No tachypnea, bradypnea, accessory muscle usage, prolonged expiration, respiratory distress or retractions.      Breath sounds: Normal breath sounds. No stridor, decreased air movement or transmitted upper airway sounds. No decreased breath sounds, wheezing, rhonchi or rales.   Abdominal:      General: Abdomen is flat. Bowel sounds are normal.      Palpations: Abdomen is soft.   Musculoskeletal:         General: No swelling, tenderness, deformity or signs of injury.      Cervical back: Normal range of motion and neck supple.      Right lower leg: No edema.      Left lower leg: No edema.   Skin:     General: Skin is warm and dry.      Capillary Refill: Capillary refill takes less than 2 seconds.   Neurological:      General: No focal deficit present.      Mental Status: She is alert and oriented to person, place, and time.      GCS: GCS eye subscore is 4. GCS verbal subscore is 5. GCS motor subscore is 6.      Gait: Gait normal.   Psychiatric:         Attention and Perception: Attention and perception normal.         Mood and Affect: Mood and affect normal.         Speech: Speech normal.         Behavior: Behavior normal. Behavior is cooperative.         Cognition and Memory: Cognition normal.          Assessment:     1. Chest pain, unspecified type    2. Acute vaginitis    3. History of chest pain      Plan:   Assessment & Plan    Assessed chest pain present since elementary school, recently more  severe  Reviewed normal ER findings: EKG, labs, and chest XR  Considered possible stress-related etiology for chest pain  Noted patient's allergy to naproxen and ibuprofen, limiting NSAID options  Evaluated recent urinalysis results showing cloudy urine with traces of white blood cells    MEDICATION ALLERGY:  - Explained that allergies to medications can develop over time.  - Discussed NSAID family of medications and their contraindications for the patient.  - Discontinued all NSAIDs including ibuprofen, naproxen, and BC's powders due to allergic reaction.    PAIN MANAGEMENT:  - Continued Tylenol as needed for pain relief.    VAGINITIS  - Culture sent to lab  - Stable    CHEST PAIN:  - EKG ordered.  - Chest XR ordered (if not already performed recently).  - Referred to Cardiology for further evaluation of chronic intermittent chest pain.  - Differential dx:  Anxiety    FOLLOW UP:  - Follow up with PCP as recommended by ER.          Follow up if symptoms worsen or fail to improve.

## 2024-09-24 LAB
C TRACH DNA SPEC QL NAA+PROBE: NOT DETECTED
N GONORRHOEA DNA SPEC QL NAA+PROBE: NOT DETECTED
OHS QRS DURATION: 78 MS
OHS QTC CALCULATION: 434 MS

## 2024-09-25 LAB
BACTERIAL VAGINOSIS DNA: POSITIVE
CANDIDA GLABRATA DNA: NEGATIVE
CANDIDA KRUSEI DNA: NEGATIVE
CANDIDA RRNA VAG QL PROBE: NEGATIVE
T VAGINALIS RRNA GENITAL QL PROBE: NEGATIVE

## 2024-09-26 DIAGNOSIS — R07.9 CHEST PAIN, UNSPECIFIED TYPE: Primary | ICD-10-CM

## 2024-11-04 ENCOUNTER — OFFICE VISIT (OUTPATIENT)
Dept: CARDIOLOGY | Facility: CLINIC | Age: 19
End: 2024-11-04
Payer: COMMERCIAL

## 2024-11-04 VITALS
HEART RATE: 84 BPM | BODY MASS INDEX: 30.48 KG/M2 | DIASTOLIC BLOOD PRESSURE: 70 MMHG | SYSTOLIC BLOOD PRESSURE: 110 MMHG | WEIGHT: 140.88 LBS | OXYGEN SATURATION: 99 %

## 2024-11-04 DIAGNOSIS — M54.9 BACK PAIN, UNSPECIFIED BACK LOCATION, UNSPECIFIED BACK PAIN LATERALITY, UNSPECIFIED CHRONICITY: ICD-10-CM

## 2024-11-04 DIAGNOSIS — R01.1 MURMUR: Primary | ICD-10-CM

## 2024-11-04 DIAGNOSIS — Z87.898 HISTORY OF CHEST PAIN: ICD-10-CM

## 2024-11-04 DIAGNOSIS — R07.9 CHEST PAIN, UNSPECIFIED TYPE: ICD-10-CM

## 2024-11-04 DIAGNOSIS — E66.811 OBESITY (BMI 30.0-34.9): ICD-10-CM

## 2024-11-04 PROCEDURE — 99999 PR PBB SHADOW E&M-EST. PATIENT-LVL III: CPT | Mod: PBBFAC,,, | Performed by: STUDENT IN AN ORGANIZED HEALTH CARE EDUCATION/TRAINING PROGRAM

## 2024-11-04 PROCEDURE — 3044F HG A1C LEVEL LT 7.0%: CPT | Mod: CPTII,S$GLB,, | Performed by: STUDENT IN AN ORGANIZED HEALTH CARE EDUCATION/TRAINING PROGRAM

## 2024-11-04 PROCEDURE — 99203 OFFICE O/P NEW LOW 30 MIN: CPT | Mod: S$GLB,,, | Performed by: STUDENT IN AN ORGANIZED HEALTH CARE EDUCATION/TRAINING PROGRAM

## 2024-11-04 PROCEDURE — 3074F SYST BP LT 130 MM HG: CPT | Mod: CPTII,S$GLB,, | Performed by: STUDENT IN AN ORGANIZED HEALTH CARE EDUCATION/TRAINING PROGRAM

## 2024-11-04 PROCEDURE — 3078F DIAST BP <80 MM HG: CPT | Mod: CPTII,S$GLB,, | Performed by: STUDENT IN AN ORGANIZED HEALTH CARE EDUCATION/TRAINING PROGRAM

## 2024-11-04 PROCEDURE — 3008F BODY MASS INDEX DOCD: CPT | Mod: CPTII,S$GLB,, | Performed by: STUDENT IN AN ORGANIZED HEALTH CARE EDUCATION/TRAINING PROGRAM

## 2024-11-04 PROCEDURE — 1159F MED LIST DOCD IN RCRD: CPT | Mod: CPTII,S$GLB,, | Performed by: STUDENT IN AN ORGANIZED HEALTH CARE EDUCATION/TRAINING PROGRAM

## 2024-11-04 NOTE — PROGRESS NOTES
Section of Cardiology                  Cardiac Clinic Note    Chief Complaint/Reason for consultation: chest pain      HPI:   Natalia Sue is a 19 y.o. female with no significant medical history who comes in to cardiology clinic as a referral to cardiology by PCP Giuseppe for evaluation.       11/4/24  Reports chest pain since elementary school  Sept 22 had chest pain, worse in intensity- workup at [facility] was negative  Chest pain is usually constant  Nothing makes it worse  Does not exercise  Not worse with exertion or SOB  Mostly with rest   Tends to eat out  No recent illness  Energy drinks 3 times a week- red bull     Denies tobacco abuse  ETOH - occ hard liquor, mostly on weekends    Denies PND, SOB, LE swelling, palpitations,     Family hx: diabetes, HTN      EKG 9/23/24 NSR, nonspecific T wave abn    ECHO  No results found for this or any previous visit.       STRESS TEST No results found for this or any previous visit.       LHC No results found for this or any previous visit.            ROS: All 10 systems reviewed. Please refer to the HPI for pertinent positives. All other systems negative.     Past Medical History  Past Medical History:   Diagnosis Date    Bronchitis     Diabetes mellitus, type 2        Surgical History  History reviewed. No pertinent surgical history.       Allergies:   Review of patient's allergies indicates:   Allergen Reactions    Ibuprofen Swelling    Naproxen Swelling     Eye swelling    Nsaids (non-steroidal anti-inflammatory drug) Swelling    Excedrin ib      Eye swelling    Penicillins Hives, Itching, Rash and Swelling       Social History:  Social History     Socioeconomic History    Marital status: Single   Tobacco Use    Smoking status: Former     Types: Cigarettes     Passive exposure: Past    Smokeless tobacco: Former   Substance and Sexual Activity    Alcohol use: Yes    Drug use: Never     Social Drivers of Health     Financial Resource Strain: Medium Risk  (9/4/2024)    Overall Financial Resource Strain (CARDIA)     Difficulty of Paying Living Expenses: Somewhat hard   Food Insecurity: Food Insecurity Present (9/4/2024)    Hunger Vital Sign     Worried About Running Out of Food in the Last Year: Sometimes true     Ran Out of Food in the Last Year: Sometimes true   Transportation Needs: No Transportation Needs (2/22/2024)    [facility]     Lack of Transportation (Medical): No     Lack of Transportation (Non-Medical): No   Physical Activity: Unknown (9/4/2024)    Exercise Vital Sign     Days of Exercise per Week: 0 days   Stress: Stress Concern Present (9/4/2024)    [employer] - Occupational Stress Questionnaire     Feeling of Stress : To some extent   Housing Stability: Unknown (9/4/2024)    Housing Stability Vital Sign     Unable to Pay for Housing in the Last Year: No       Family History:  family history includes Diabetes in her mother; No Known Problems in her father.    Home Medications:  Current Outpatient Medications on File Prior to Visit   Medication Sig Dispense Refill    buPROPion (WELLBUTRIN XL) 300 MG 24 hr tablet Take 1 tablet (300 mg total) by mouth once daily. 90 tablet 1    ferrous sulfate (FEOSOL) 325 mg (65 mg iron) Tab tablet Take 325 mg by mouth once daily.      ketoconazole (NIZORAL) 2 % shampoo Apply topically twice a week. 120 mL 5    triamcinolone acetonide 0.1% (KENALOG) 0.1 % ointment Apply topically 2 (two) times daily. 30 g 3     No current facility-administered medications on file prior to visit.       Physical exam:  /70 (BP Location: Right arm, Patient Position: Sitting)   Pulse 84   Wt 63.9 kg (140 lb 14 oz)   SpO2 99%   BMI 30.48 kg/m²         General: Pt is a 19 y.o. year old female who is AAOx3, in NAD, is pleasant, well nourished, looks stated age  HEENT: PERRL, EOMI, Oral mucosa pink & moist  CVS  No abnormal cardiac pulsations noted on  "inspection. JVP not raised. The apical impulse is normal on palpation, and is located in the left 5th intercostal space in the mid - clavicular line. No palpable thrills or abnormal pulsations noted. RR, S1 - S2 heard, 1/6 systolic murmur, rubs or gallops appreciated.   PUL : CTA B/L. No wheezes/crackles heard   ABD : BS +, soft. No tenderness elicited   LE : No C/C/E. Distal Pulses palpable B/L         LABS:    Chemistry:   Lab Results   Component Value Date     05/06/2024    K 4.1 05/06/2024     05/06/2024    CO2 21 (L) 05/06/2024    BUN 10 05/06/2024    CREATININE 0.7 05/06/2024    CALCIUM 9.4 05/06/2024     Cardiac Markers:   Lab Results   Component Value Date    TROPONINI <0.006 05/06/2024     Cardiac Markers (Last 3):   Lab Results   Component Value Date    TROPONINI <0.006 05/06/2024     CBC:   Lab Results   Component Value Date    WBC 13.39 (H) 09/12/2024    HGB 11.0 (L) 09/12/2024    HCT 35.3 (L) 09/12/2024    MCV 80 (L) 09/12/2024     09/12/2024     Lipids:   Lab Results   Component Value Date    CHOL 150 09/05/2024    TRIG 125 09/05/2024    HDL 65 09/05/2024     Coagulation: No results found for: "PT", "INR", "APTT"        Assessment        1. Murmur    2. Chest pain, unspecified type    3. History of chest pain    4. Obesity (BMI 30.0-34.9)    5. Back pain, unspecified back location, unspecified back pain laterality, unspecified chronicity         Plan:      Murmur/atypical chest pain  Obtain echo  Obtain ETT    Back pain  Constant  Was told she had scoliosis  Refer to ortho    Obesity, Body mass index is 30.48 kg/m².   Low salt, low fat diet  Exercise as tolerated, at least 30 min daily     This note was prepared using voice recognition system and is likely to have sound alike errors that may have been overlooked even after proofreading.     I have reviewed all pertinent chart information.  Plans and recommendations have been formulated under my direct supervision. All questions " answered and patient voiced understanding.   If symptoms persist go to the ED.    RTC prjason Hill MD  Cardiology

## 2024-11-06 DIAGNOSIS — L21.9 SEBORRHEIC DERMATITIS OF SCALP: ICD-10-CM

## 2024-11-06 RX ORDER — KETOCONAZOLE 20 MG/ML
SHAMPOO, SUSPENSION TOPICAL
Qty: 120 ML | Refills: 5 | Status: CANCELLED | OUTPATIENT
Start: 2024-11-07

## 2024-11-06 RX ORDER — KETOCONAZOLE 20 MG/ML
SHAMPOO, SUSPENSION TOPICAL
Qty: 120 ML | Refills: 5 | Status: SHIPPED | OUTPATIENT
Start: 2024-11-07

## 2024-11-08 ENCOUNTER — HOSPITAL ENCOUNTER (OUTPATIENT)
Dept: CARDIOLOGY | Facility: HOSPITAL | Age: 19
Discharge: HOME | End: 2024-11-08
Attending: STUDENT IN AN ORGANIZED HEALTH CARE EDUCATION/TRAINING PROGRAM
Payer: COMMERCIAL

## 2024-11-08 ENCOUNTER — TELEPHONE (OUTPATIENT)
Dept: CARDIOLOGY | Facility: CLINIC | Age: 19
End: 2024-11-08

## 2024-11-08 VITALS
WEIGHT: 140 LBS | DIASTOLIC BLOOD PRESSURE: 70 MMHG | SYSTOLIC BLOOD PRESSURE: 110 MMHG | HEIGHT: 57 IN | HEART RATE: 78 BPM | BODY MASS INDEX: 30.2 KG/M2

## 2024-11-08 VITALS
SYSTOLIC BLOOD PRESSURE: 81 MMHG | BODY MASS INDEX: 30.2 KG/M2 | HEIGHT: 57 IN | DIASTOLIC BLOOD PRESSURE: 51 MMHG | WEIGHT: 140 LBS | HEART RATE: 86 BPM

## 2024-11-08 DIAGNOSIS — R01.1 MURMUR: ICD-10-CM

## 2024-11-08 DIAGNOSIS — R07.9 CHEST PAIN, UNSPECIFIED TYPE: ICD-10-CM

## 2024-11-08 LAB
AORTIC ROOT ANNULUS: 2.16 CM
ASCENDING AORTA: 1.95 CM
AV INDEX (PROSTH): 0.61
AV MEAN GRADIENT: 4.2 MMHG
AV PEAK GRADIENT: 7.8 MMHG
AV VALVE AREA BY VELOCITY RATIO: 1.6 CM²
AV VALVE AREA: 1.7 CM²
AV VELOCITY RATIO: 0.57
BSA FOR ECHO PROCEDURE: 1.6 M2
CV ECHO LV RWT: 0.37 CM
CV STRESS BASE HR: 83 BPM
DIASTOLIC BLOOD PRESSURE: 51 MMHG
DOP CALC AO PEAK VEL: 1.4 M/S
DOP CALC AO VTI: 26.6 CM
DOP CALC LVOT AREA: 2.8 CM2
DOP CALC LVOT DIAMETER: 1.9 CM
DOP CALC LVOT PEAK VEL: 0.8 M/S
DOP CALC LVOT STROKE VOLUME: 46.2 CM3
DOP CALC RVOT PEAK VEL: 0.6 M/S
DOP CALC RVOT VTI: 13.3 CM
DOP CALCLVOT PEAK VEL VTI: 16.3 CM
E WAVE DECELERATION TIME: 146.5 MSEC
E/A RATIO: 1.29
E/E' RATIO: 5.63 M/S
ECHO LV POSTERIOR WALL: 0.8 CM (ref 0.6–1.1)
FRACTIONAL SHORTENING: 32.6 % (ref 28–44)
INTERVENTRICULAR SEPTUM: 0.8 CM (ref 0.6–1.1)
IVRT: 68.51 MSEC
LA MAJOR: 4.35 CM
LA MINOR: 4.17 CM
LA WIDTH: 2.9 CM
LEFT ATRIUM AREA SYSTOLIC (APICAL 2 CHAMBER): 11.14 CM2
LEFT ATRIUM AREA SYSTOLIC (APICAL 4 CHAMBER): 11.34 CM2
LEFT ATRIUM SIZE: 2.72 CM
LEFT ATRIUM VOLUME INDEX MOD: 15.8 ML/M2
LEFT ATRIUM VOLUME INDEX: 18.4 ML/M2
LEFT ATRIUM VOLUME MOD: 24.42 ML
LEFT ATRIUM VOLUME: 28.55 CM3
LEFT INTERNAL DIMENSION IN SYSTOLE: 2.9 CM (ref 2.1–4)
LEFT VENTRICLE DIASTOLIC VOLUME INDEX: 53.7 ML/M2
LEFT VENTRICLE DIASTOLIC VOLUME: 83.24 ML
LEFT VENTRICLE END SYSTOLIC VOLUME APICAL 2 CHAMBER: 24.36 ML
LEFT VENTRICLE END SYSTOLIC VOLUME APICAL 4 CHAMBER: 23.27 ML
LEFT VENTRICLE MASS INDEX: 68 G/M2
LEFT VENTRICLE SYSTOLIC VOLUME INDEX: 21.4 ML/M2
LEFT VENTRICLE SYSTOLIC VOLUME: 33.11 ML
LEFT VENTRICULAR INTERNAL DIMENSION IN DIASTOLE: 4.3 CM (ref 3.5–6)
LEFT VENTRICULAR MASS: 105.3 G
LV LATERAL E/E' RATIO: 5.07 M/S
LV SEPTAL E/E' RATIO: 6.33 M/S
LVED V (TEICH): 83.24 ML
LVES V (TEICH): 33.11 ML
LVOT MG: 1.69 MMHG
LVOT MV: 0.61 CM/S
MV PEAK A VEL: 0.59 M/S
MV PEAK E VEL: 0.76 M/S
MV STENOSIS PRESSURE HALF TIME: 42.48 MS
MV VALVE AREA P 1/2 METHOD: 5.18 CM2
OHS CV CPX 1 MINUTE RECOVERY HEART RATE: 146 BPM
OHS CV CPX 85 PERCENT MAX PREDICTED HEART RATE MALE: 170
OHS CV CPX ESTIMATED METS: 10
OHS CV CPX MAX PREDICTED HEART RATE: 200
OHS CV CPX PATIENT IS FEMALE: 1
OHS CV CPX PATIENT IS MALE: 0
OHS CV CPX PEAK DIASTOLIC BLOOD PRESSURE: 53 MMHG
OHS CV CPX PEAK HEAR RATE: 164 BPM
OHS CV CPX PEAK RATE PRESSURE PRODUCT: NORMAL
OHS CV CPX PEAK SYSTOLIC BLOOD PRESSURE: 148 MMHG
OHS CV CPX PERCENT MAX PREDICTED HEART RATE ACHIEVED: 87
OHS CV CPX RATE PRESSURE PRODUCT PRESENTING: 6723
PULM VEIN S/D RATIO: 0.69
PV MEAN GRADIENT: 1 MMHG
PV PEAK D VEL: 0.71 M/S
PV PEAK GRADIENT: 8
PV PEAK S VEL: 0.49 M/S
PV PEAK VELOCITY: 1.37 M/S
RA MAJOR: 3.53 CM
RA WIDTH: 2.84 CM
RIGHT VENTRICULAR END-DIASTOLIC DIMENSION: 2.6 CM
SINUS: 1.94 CM
STJ: 1.78 CM
STRESS ECHO POST EXERCISE DUR MIN: 9 MINUTES
SYSTOLIC BLOOD PRESSURE: 81 MMHG
TDI LATERAL: 0.15 M/S
TDI SEPTAL: 0.12 M/S
TDI: 0.14 M/S
Z-SCORE OF LEFT VENTRICULAR DIMENSION IN END DIASTOLE: -0.43
Z-SCORE OF LEFT VENTRICULAR DIMENSION IN END SYSTOLE: 0.32

## 2024-11-08 PROCEDURE — 93016 CV STRESS TEST SUPVJ ONLY: CPT | Mod: ,,, | Performed by: INTERNAL MEDICINE

## 2024-11-08 PROCEDURE — 93306 TTE W/DOPPLER COMPLETE: CPT | Mod: 26,,, | Performed by: INTERNAL MEDICINE

## 2024-11-08 PROCEDURE — 93017 CV STRESS TEST TRACING ONLY: CPT

## 2024-11-08 PROCEDURE — 93018 CV STRESS TEST I&R ONLY: CPT | Mod: ,,, | Performed by: INTERNAL MEDICINE

## 2024-11-08 PROCEDURE — 93306 TTE W/DOPPLER COMPLETE: CPT

## 2024-11-08 NOTE — TELEPHONE ENCOUNTER
Spoke with patient gave normal stress test results.    ----- Message from Onel Fernandez sent at 11/8/2024  4:07 PM CST -----    ----- Message -----  From: Odette Wild PA-C  Sent: 11/8/2024   3:58 PM CST  To: [facility] Staff    Stress test negative for ischemia.

## 2024-11-11 LAB
AORTIC ROOT ANNULUS: 2.16 CM
ASCENDING AORTA: 1.95 CM
AV INDEX (PROSTH): 0.61
AV MEAN GRADIENT: 4.2 MMHG
AV PEAK GRADIENT: 7.8 MMHG
AV VALVE AREA BY VELOCITY RATIO: 1.6 CM²
AV VALVE AREA: 1.7 CM²
AV VELOCITY RATIO: 0.57
BSA FOR ECHO PROCEDURE: 1.6 M2
CV ECHO LV RWT: 0.37 CM
DOP CALC AO PEAK VEL: 1.4 M/S
DOP CALC AO VTI: 26.6 CM
DOP CALC LVOT AREA: 2.8 CM2
DOP CALC LVOT DIAMETER: 1.9 CM
DOP CALC LVOT PEAK VEL: 0.8 M/S
DOP CALC LVOT STROKE VOLUME: 46.2 CM3
DOP CALC RVOT PEAK VEL: 0.6 M/S
DOP CALC RVOT VTI: 13.3 CM
DOP CALCLVOT PEAK VEL VTI: 16.3 CM
E WAVE DECELERATION TIME: 146.5 MSEC
E/A RATIO: 1.29
E/E' RATIO: 5.63 M/S
ECHO LV POSTERIOR WALL: 0.8 CM (ref 0.6–1.1)
FRACTIONAL SHORTENING: 32.6 % (ref 28–44)
INTERVENTRICULAR SEPTUM: 0.8 CM (ref 0.6–1.1)
IVRT: 68.51 MSEC
LA MAJOR: 4.35 CM
LA MINOR: 4.17 CM
LA WIDTH: 2.9 CM
LEFT ATRIUM AREA SYSTOLIC (APICAL 2 CHAMBER): 11.14 CM2
LEFT ATRIUM AREA SYSTOLIC (APICAL 4 CHAMBER): 11.34 CM2
LEFT ATRIUM SIZE: 2.72 CM
LEFT ATRIUM VOLUME INDEX MOD: 15.8 ML/M2
LEFT ATRIUM VOLUME INDEX: 18.4 ML/M2
LEFT ATRIUM VOLUME MOD: 24.42 ML
LEFT ATRIUM VOLUME: 28.55 CM3
LEFT INTERNAL DIMENSION IN SYSTOLE: 2.9 CM (ref 2.1–4)
LEFT VENTRICLE DIASTOLIC VOLUME INDEX: 53.7 ML/M2
LEFT VENTRICLE DIASTOLIC VOLUME: 83.24 ML
LEFT VENTRICLE END SYSTOLIC VOLUME APICAL 2 CHAMBER: 24.36 ML
LEFT VENTRICLE END SYSTOLIC VOLUME APICAL 4 CHAMBER: 23.27 ML
LEFT VENTRICLE MASS INDEX: 68 G/M2
LEFT VENTRICLE SYSTOLIC VOLUME INDEX: 21.4 ML/M2
LEFT VENTRICLE SYSTOLIC VOLUME: 33.11 ML
LEFT VENTRICULAR INTERNAL DIMENSION IN DIASTOLE: 4.3 CM (ref 3.5–6)
LEFT VENTRICULAR MASS: 105.3 G
LV LATERAL E/E' RATIO: 5.07 M/S
LV SEPTAL E/E' RATIO: 6.33 M/S
LVED V (TEICH): 83.24 ML
LVES V (TEICH): 33.11 ML
LVOT MG: 1.69 MMHG
LVOT MV: 0.61 CM/S
MV PEAK A VEL: 0.59 M/S
MV PEAK E VEL: 0.76 M/S
MV STENOSIS PRESSURE HALF TIME: 42.48 MS
MV VALVE AREA P 1/2 METHOD: 5.18 CM2
PULM VEIN S/D RATIO: 0.69
PV MEAN GRADIENT: 1 MMHG
PV PEAK D VEL: 0.71 M/S
PV PEAK GRADIENT: 8
PV PEAK S VEL: 0.49 M/S
PV PEAK VELOCITY: 1.37 M/S
RA MAJOR: 3.53 CM
RA PRESSURE ESTIMATED: 3 MMHG
RA WIDTH: 2.84 CM
RIGHT VENTRICULAR END-DIASTOLIC DIMENSION: 2.6 CM
SINUS: 1.94 CM
STJ: 1.78 CM
TDI LATERAL: 0.15 M/S
TDI SEPTAL: 0.12 M/S
TDI: 0.14 M/S
Z-SCORE OF LEFT VENTRICULAR DIMENSION IN END DIASTOLE: -0.43
Z-SCORE OF LEFT VENTRICULAR DIMENSION IN END SYSTOLE: 0.32

## 2024-11-13 ENCOUNTER — TELEPHONE (OUTPATIENT)
Dept: PAIN MEDICINE | Facility: CLINIC | Age: 19
End: 2024-11-13
Payer: COMMERCIAL

## 2024-11-14 ENCOUNTER — OFFICE VISIT (OUTPATIENT)
Dept: PAIN MEDICINE | Facility: CLINIC | Age: 19
End: 2024-11-14
Payer: COMMERCIAL

## 2024-11-14 VITALS
BODY MASS INDEX: 30.11 KG/M2 | DIASTOLIC BLOOD PRESSURE: 75 MMHG | SYSTOLIC BLOOD PRESSURE: 111 MMHG | WEIGHT: 139.56 LBS | HEART RATE: 107 BPM | HEIGHT: 57 IN

## 2024-11-14 DIAGNOSIS — G89.29 CHRONIC MYOFASCIAL PAIN: Primary | ICD-10-CM

## 2024-11-14 DIAGNOSIS — M79.18 CHRONIC MYOFASCIAL PAIN: Primary | ICD-10-CM

## 2024-11-14 DIAGNOSIS — M54.9 BACK PAIN, UNSPECIFIED BACK LOCATION, UNSPECIFIED BACK PAIN LATERALITY, UNSPECIFIED CHRONICITY: ICD-10-CM

## 2024-11-14 PROCEDURE — 3044F HG A1C LEVEL LT 7.0%: CPT | Mod: CPTII,S$GLB,, | Performed by: PHYSICAL MEDICINE & REHABILITATION

## 2024-11-14 PROCEDURE — 3078F DIAST BP <80 MM HG: CPT | Mod: CPTII,S$GLB,, | Performed by: PHYSICAL MEDICINE & REHABILITATION

## 2024-11-14 PROCEDURE — 99999 PR PBB SHADOW E&M-EST. PATIENT-LVL IV: CPT | Mod: PBBFAC,,, | Performed by: PHYSICAL MEDICINE & REHABILITATION

## 2024-11-14 PROCEDURE — 1159F MED LIST DOCD IN RCRD: CPT | Mod: CPTII,S$GLB,, | Performed by: PHYSICAL MEDICINE & REHABILITATION

## 2024-11-14 PROCEDURE — 99204 OFFICE O/P NEW MOD 45 MIN: CPT | Mod: S$GLB,,, | Performed by: PHYSICAL MEDICINE & REHABILITATION

## 2024-11-14 PROCEDURE — 3074F SYST BP LT 130 MM HG: CPT | Mod: CPTII,S$GLB,, | Performed by: PHYSICAL MEDICINE & REHABILITATION

## 2024-11-14 PROCEDURE — 3008F BODY MASS INDEX DOCD: CPT | Mod: CPTII,S$GLB,, | Performed by: PHYSICAL MEDICINE & REHABILITATION

## 2024-11-14 NOTE — PROGRESS NOTES
New Patient Chronic Pain Note (Initial Visit)    Referring Physician: Tyrell Hill MD    PCP: No primary care provider on file.    Chief Complaint: No chief complaint on file.       SUBJECTIVE:    Natalia Sue is a 19 y.o. female who presents to the clinic for the evaluation of neck and back pain.  She was referred by cardiology for further evaluation and management of this pain.  She has past medical history of DM2.  The pain started a few years ago without any injury or trauma and symptoms have been unchanged.  She was told that she had scoliosis at a young age.  The pain is located in the thoracolumbar area and radiates to the sacral area.  The pain is described as  aching  and is rated as 5/10. The pain is rated with a score of  2/10 on the BEST day and a score of 9/10 on the WORST day.  Symptoms interfere with daily activity. The pain is exacerbated by standing for prolonged times.  The pain is mitigated by cracking her back . Employment status:  Medical assistant    Patient denies night fever/night sweats, urinary incontinence, bowel incontinence, significant weight loss, significant motor weakness, and loss of sensations.    Pain Disability Index Review:         11/14/2024     4:11 PM   Last 3 PDI Scores   Pain Disability Index (PDI) 35       Non-Pharmacologic Treatments:  Physical Therapy/Home Exercise: no  Ice/Heat:no  TENS: no  Acupuncture: no  Massage: no  Chiropractic: no    Other: no      Pain Medications:  - Opioids:  None recently  - Adjuvant Medications:  Over-the-counter analgesics  - Anti-Coagulants:  None     report:  Reviewed and consistent with medication use as prescribed.    Pain Procedures:   Denies      Imaging:   CT abd/pelvis 10/02/2023:  Liver spleen and pancreas unremarkable     Kidneys without hydronephrosis     Scant ascites.  No obstructive or inflammatory bowel findings     Urinary bladder unremarkable    X-ray spine 06/27/2023:  1.  No acute fracture, subluxation,  dislocation, osteoblastic or osteolytic lesion.   2.  No vertebral anomalies. There are 12 paired ribs.   3.  There is a slight dextrocurvature with the apex at T10. The calculated Laws angle is 5.1 degrees measuring from superior endplate of T8 to superior endplate of T12. There is a slight levocurvature in the lumbar spine with the apex at L3-4. The calculated Laws angle is 5.2 degrees measuring from superior endplate of L1 to superior endplate of L5.   4.  No evidence of cardiopulmonary disease.   5.  Moderate stool throughout large bowel. No acute finding otherwise within the abdomen and pelvis.   6.  The surrounding soft tissues otherwise appear intact.       Past Medical History:   Diagnosis Date    Bronchitis     Diabetes mellitus, type 2      History reviewed. No pertinent surgical history.  Social History     Socioeconomic History    Marital status: Single   Tobacco Use    Smoking status: Former     Types: Cigarettes     Passive exposure: Past    Smokeless tobacco: Former   Substance and Sexual Activity    Alcohol use: Yes    Drug use: Never     Social Drivers of Health     Financial Resource Strain: Medium Risk (9/4/2024)    Overall Financial Resource Strain (CARDIA)     Difficulty of Paying Living Expenses: Somewhat hard   Food Insecurity: Food Insecurity Present (9/4/2024)    Hunger Vital Sign     Worried About Running Out of Food in the Last Year: Sometimes true     Ran Out of Food in the Last Year: Sometimes true   Transportation Needs: No Transportation Needs (2/22/2024)    Received from [facility]     Lack of Transportation (Medical): No     Lack of Transportation (Non-Medical): No   Physical Activity: Unknown (9/4/2024)    Exercise Vital Sign     Days of Exercise per Week: 0 days   Stress: Stress Concern Present (9/4/2024)    [place] [employer] - Occupational Stress Questionnaire     Feeling of Stress : To some extent   Housing Stability:  "Unknown (9/4/2024)    Housing Stability Vital Sign     Unable to Pay for Housing in the Last Year: No     Family History   Problem Relation Name Age of Onset    Diabetes Mother      No Known Problems Father         Review of patient's allergies indicates:   Allergen Reactions    Ibuprofen Swelling    Naproxen Swelling     Eye swelling    Nsaids (non-steroidal anti-inflammatory drug) Swelling    Excedrin ib      Eye swelling    Penicillins Hives, Itching, Rash and Swelling       Current Outpatient Medications   Medication Sig    buPROPion (WELLBUTRIN XL) 300 MG 24 hr tablet Take 1 tablet (300 mg total) by mouth once daily.    ferrous sulfate (FEOSOL) 325 mg (65 mg iron) Tab tablet Take 325 mg by mouth once daily.    ketoconazole (NIZORAL) 2 % shampoo Apply topically twice a week    triamcinolone acetonide 0.1% (KENALOG) 0.1 % ointment Apply topically 2 (two) times daily.     No current facility-administered medications for this visit.       Review of Systems     GENERAL:  No weight loss, malaise or fevers.  HEENT:   No recent changes in vision or hearing  NECK:  Negative for lumps, no difficulty with swallowing.  RESPIRATORY:  Negative for cough, wheezing or shortness of breath, patient denies any recent URI.  CARDIOVASCULAR:  Negative for chest pain, leg swelling or palpitations.  GI:  Negative for abdominal discomfort, blood in stools or black stools or change in bowel habits.  MUSCULOSKELETAL:  See HPI.  SKIN:  Negative for lesions, rash, and itching.  PSYCH:  No mood disorder or recent psychosocial stressors.  Patients sleep is not disturbed secondary to pain.  HEMATOLOGY/LYMPHOLOGY:  Negative for prolonged bleeding, bruising easily or swollen nodes.  Patient is not currently taking any anti-coagulants  NEURO:   No history of headaches, syncope, paralysis, seizures or tremors.  All other reviewed and negative other than HPI.    OBJECTIVE:    /75   Pulse 107   Ht 4' 9" (1.448 m)   Wt 63.3 kg (139 lb 8.8 " oz)   LMP 10/14/2024   BMI 30.20 kg/m²         Physical Exam    GENERAL: Well appearing, in no acute distress, alert and oriented x3.  PSYCH:  Mood and affect appropriate.  SKIN: Skin color, texture, turgor normal, no rashes or lesions.  HEAD/FACE:  Normocephalic, atraumatic. Cranial nerves grossly intact.  NECK:  Mild-to-moderate pain to palpation over the cervical paraspinous muscles. Spurling Negative.  Minimal pain with neck flexion, extension, or lateral flexion.   CV: RRR with palpation of the radial artery.  PULM: No evidence of respiratory difficulty, symmetric chest rise.  GI:  Soft and non-tender.  BACK: Straight leg raising in the sitting and supine positions is negative to radicular pain.  Mild-to-moderate pain to palpation over the facet joints of the lumbar spine or spinous processes. Normal range of motion without pain reproduction.  EXTREMITIES: Peripheral joint ROM is full and pain free without obvious instability or laxity in all four extremities. No deformities, edema, or skin discoloration. Good capillary refill.  MUSCULOSKELETAL: Shoulder, hip, and knee provocative maneuvers are negative.  There is minimal pain with palpation over the sacroiliac joints bilaterally.  FABERs test is negative.  FADIRs test is negative.   Bilateral upper and lower extremity strength is normal and symmetric.  No atrophy or tone abnormalities are noted.  NEURO: Bilateral upper and lower extremity coordination and muscle stretch reflexes are physiologic and symmetric.  Plantar response are downgoing. No clonus.  No loss of sensation is noted.  GAIT: normal.      LABS:  Lab Results   Component Value Date    WBC 13.39 (H) 09/12/2024    HGB 11.0 (L) 09/12/2024    HCT 35.3 (L) 09/12/2024    MCV 80 (L) 09/12/2024     09/12/2024       CMP  Sodium   Date Value Ref Range Status   05/06/2024 138 136 - 145 mmol/L Final     Potassium   Date Value Ref Range Status   05/06/2024 4.1 3.5 - 5.1 mmol/L Final     Chloride    Date Value Ref Range Status   05/06/2024 105 95 - 110 mmol/L Final     CO2   Date Value Ref Range Status   05/06/2024 21 (L) 23 - 29 mmol/L Final     Glucose   Date Value Ref Range Status   05/06/2024 109 70 - 110 mg/dL Final     BUN   Date Value Ref Range Status   05/06/2024 10 6 - 20 mg/dL Final     Creatinine   Date Value Ref Range Status   05/06/2024 0.7 0.5 - 1.4 mg/dL Final     Calcium   Date Value Ref Range Status   05/06/2024 9.4 8.7 - 10.5 mg/dL Final     Total Protein   Date Value Ref Range Status   05/06/2024 8.0 6.0 - 8.4 g/dL Final     Albumin   Date Value Ref Range Status   05/06/2024 3.8 3.5 - 5.2 g/dL Final     Total Bilirubin   Date Value Ref Range Status   05/06/2024 0.4 0.1 - 1.0 mg/dL Final     Comment:     For infants and newborns, interpretation of results should be based  on gestational age, weight and in agreement with clinical  observations.    Premature Infant recommended reference ranges:  Up to 24 hours.............<8.0 mg/dL  Up to 48 hours............<12.0 mg/dL  3-5 days..................<15.0 mg/dL  6-29 days.................<15.0 mg/dL       Alkaline Phosphatase   Date Value Ref Range Status   05/06/2024 91 55 - 135 U/L Final     AST   Date Value Ref Range Status   05/06/2024 16 10 - 40 U/L Final     ALT   Date Value Ref Range Status   05/06/2024 12 10 - 44 U/L Final     Anion Gap   Date Value Ref Range Status   05/06/2024 12 8 - 16 mmol/L Final     eGFR if    Date Value Ref Range Status   11/14/2021 SEE COMMENT >60 mL/min/1.73 m^2 Final     eGFR if non    Date Value Ref Range Status   11/14/2021 SEE COMMENT >60 mL/min/1.73 m^2 Final     Comment:     Calculation used to obtain the estimated glomerular filtration  rate (eGFR) is the CKD-EPI equation.   Test not performed.  GFR calculation is only valid for patients   18 and older.         Lab Results   Component Value Date    HGBA1C 6.0 (H) 09/05/2024             ASSESSMENT: 19 y.o. year old female  with neck and back pain, consistent with     1. Chronic myofascial pain  Ambulatory referral/consult to Physical/Occupational Therapy      2. Back pain, unspecified back location, unspecified back pain laterality, unspecified chronicity  Ambulatory referral/consult to Orthopedics    Ambulatory referral/consult to Pain Clinic            PLAN:   - Interventions:  None at this time    - Anticoagulation use:  None    - Medications: I have stressed the importance of physical activity and a home exercise plan to help with pain and improve health. and Patient can continue with medications for now since they are providing benefits, using them appropriately, and without side effects.       - Therapy: Referral to Physical therapy for chronic neck and back pain    - Psychological:  Discussed coping mechanisms to help address chronic pain issues    - Labs:  Reviewed    - Imaging: Reviewed available imaging with patient and answered any questions they had regarding study.    - Consults/Referrals:  Physical therapy    - Records:  Reviewed/Obtain old records from outside physicians and imaging    - Follow up visit: return to clinic as needed    - Counseled patient regarding the importance of activity modification and physical therapy    - This condition does not require this patient to take time off of work, and the primary goal of our Pain Management services is to improve the patient's functional capacity.    - Patient Questions: Answered all of the patient's questions regarding diagnosis, therapy, and treatment        The above plan and management options were discussed at length with patient. Patient is in agreement with the above and verbalized understanding.    I discussed the goals of interventional chronic pain management with the patient on today's visit.  I explained the utility of injections for diagnostic and therapeutic purposes.  We discussed a multimodal approach to pain including treating the patient's given worst  pain at any given time.  We will use a systematic approach to addressing pain.  We will also adopt a multimodal approach that includes injections, adjuvant medications, physical therapy, at times psychiatry.  There may be a limited role for opioid use intermittently in the treatment of pain, more particularly for acute pain although no one approach can be used as a sole treatment modality.    I emphasized the importance of regular exercise, core strengthening and stretching, diet and weight loss as a cornerstone of long-term pain management.      Seven Marina MD  Interventional Pain Management  [facility]    Disclaimer:  This note was prepared using voice recognition system and is likely to have sound alike errors that may have been overlooked even after proof reading.  Please call me with any questions

## 2024-11-15 ENCOUNTER — CLINICAL SUPPORT (OUTPATIENT)
Dept: REHABILITATION | Facility: HOSPITAL | Age: 19
End: 2024-11-15
Attending: PHYSICAL MEDICINE & REHABILITATION
Payer: COMMERCIAL

## 2024-11-15 ENCOUNTER — PATIENT MESSAGE (OUTPATIENT)
Dept: CARDIOLOGY | Facility: CLINIC | Age: 19
End: 2024-11-15
Payer: COMMERCIAL

## 2024-11-15 DIAGNOSIS — G89.29 CHRONIC MYOFASCIAL PAIN: ICD-10-CM

## 2024-11-15 DIAGNOSIS — M79.18 CHRONIC MYOFASCIAL PAIN: ICD-10-CM

## 2024-11-15 PROCEDURE — 97750 PHYSICAL PERFORMANCE TEST: CPT | Mod: 32,PN

## 2024-11-17 ENCOUNTER — PATIENT MESSAGE (OUTPATIENT)
Dept: CARDIOLOGY | Facility: CLINIC | Age: 19
End: 2024-11-17
Payer: COMMERCIAL

## 2024-11-18 ENCOUNTER — TELEPHONE (OUTPATIENT)
Dept: CARDIOLOGY | Facility: CLINIC | Age: 19
End: 2024-11-18
Payer: COMMERCIAL

## 2024-11-18 NOTE — TELEPHONE ENCOUNTER
Contacted patient to discuss results;patient received and understood results with no questions or concerns.      ----- Message from Tyrell Hill MD sent at 11/17/2024  7:22 PM CST -----  Call patient  Stress test is normal.

## 2024-11-18 NOTE — PLAN OF CARE
[facility]  Physical Therapy Lumbar Evaluation      Name: Natalia Sue  Clinic Number: [id number]    Therapy Diagnosis:   Encounter Diagnosis   Name Primary?    Chronic myofascial pain      Physician: Seven Marina MD    Physician Orders: PT Eval and Treat  Medical Diagnosis from Referral: Chronic myofascial pain  Evaluation Date: 11/15/2024  Authorization Period Expiration: 12/31/2024  Plan of Care Expiration: 1/24/2025  Reassessment Due: 12/15/2024  Visit # / Visits authorized: 1/20  MedX testing visit 2    Time In: 0730  Time Out: 0815  Total Billable Time: 45 minutes  INSURANCE and OUTCOMES: Program Benefit Group with Lumbar Outcomes (Oswestry and AQoL) 1/3    Precautions: standard    Pattern of pain determined:     Subjective     Date of onset: 11/14/2024  History of current condition: Natalia reports pain from neck to low back since elementary school.   No event noted with start of pain.  Pain increases and runs down the right lower extremity with daily activity such as cleaning and standing     Medical History:   Past Medical History:   Diagnosis Date    Bronchitis     Diabetes mellitus, type 2        Surgical History:   Natalia Sue  has no past surgical history on file.    Medications:   Natalia has a current medication list which includes the following prescription(s): bupropion, ferrous sulfate, ketoconazole, and triamcinolone acetonide 0.1%.    Allergies:   Review of patient's allergies indicates:   Allergen Reactions    Ibuprofen Swelling    Naproxen Swelling     Eye swelling    Nsaids (non-steroidal anti-inflammatory drug) Swelling    Excedrin ib      Eye swelling    Penicillins Hives, Itching, Rash and Swelling        Imaging: X-ray None    Prior Therapy: Yes  Prior Treatment: Yes  Social History:  lives with their partner  Occupation: Medical assistant and caregiver  Leisure: Works every day      Prior Level of Function: Independent  Current Level of  Function: Independent with ADLs and iADLs  DME owned/used: N/A  Gym Membership: Yes    Pain:  Current 4/10, worst 10/10, best 4/10   Location: bilateral neck to low back   Description: Aching  Aggravating Factors: prolonged standing, housework, and work related lifting  Easing Factors: relaxation and self cracks neck and back  Disturbed Sleep: None    Pattern of pain questions:   1.  Where is your pain the worst? Neck and shoulders  2.  Is your pain constant or intermittent? Intermittent  3.  Does bending forward make your typical pain worse? Yes  4.  Since the start of your back pain, has there been a change in your bowel or bladder? Increased urgency  5.  What can't you do now that you use to be able to do? Housework and standing    Pts goals: decreased pain    Red Flag Screening:   Cough/Sneeze Strain: (--)  Bladder/Bowel: (--)  Falls: (--)  Night pain: (--)  Unexplained weight loss: (--)  General health: Good    Objective      Postural examination/scapula alignment: Rounded shoulder and Head forward    Cervical increased tightness on right with right rotation  Tight on left with SB t0 left    MOVEMENT LOSS - Lumbar   Norms ROM Loss Initial   Flexion Fingers touch toes, sacral angle >/= 70 deg, uniform spinal curvature, posterior weight shift  moderate loss   Extension ASIS surpasses toes, spine of scapulae surpasses heels, uniform spinal curve minimal loss   Side glide Right  minimal loss   Side glide Left  minimal loss   Rotation Right PT observes contralateral shoulder minimal loss   Rotation Left PT observes contralateral shoulder moderate loss     Lower Extremity Strength  Right LE  Left LE    Hip flexion: 4/5 Hip flexion: 4/5   Hip extension:  4-/5 Hip extension: 4/5   Hip abduction: 4/5 Hip abduction: 4/5   Hip adduction:  4/5 Hip adduction:  4/5   Knee Flexion 4/5 Knee Flexion 4/5   Knee Extension 4/5 Knee Extension 4/5   Ankle dorsiflexion: 4/5 Ankle dorsiflexion: 4/5   Ankle plantarflexion: 4/5 Ankle  plantarflexion: 4/5     GAIT:  Assistive Device used: none  Level of Assistance: independent  Patient displays the following gait deviations: no gait deviations observed.   REPEATED TEST MOVEMENTS:    Baseline symptoms:  Repeated Flexion in Standing produced   Repeated Extension in Standing no worse         Lumbar testing Visit 2      Treatment     Total Treatment time separate from Evaluation: 20 minutes    Written Home Exercises Provided: yes.    HEP AS FOLLOWS:  Bridges  Clamshells  Dynamic hamstring stretch    Exercises were reviewed and Natalia was able to demonstrate them prior to the end of the session. Natalia demonstrated good  understanding of the education provided.     See EMR under Patient Instructions for exercises provided 11/15/2024.      MedX Testing:  MedX testing to be performed next visit      Therapeutic Education/Activity provided for 20 minutes:   - Patient was given an [facility] Visit 1 handout which discusses the following:  - what to expect in therapy  - an overview of the program, including health coaching and wellness  - importance of spinal hygiene, proper posture, lifting mechanics, sleep quality, and nutrition/hydration   - [place] roll trialed, recommended, and purchase information was provided.  - Patient received a handout regarding anticipated muscular soreness following the isometric test and strategies for management were reviewed with patient including stretching, using ice and scheduled rest.   - Patient received verbal education on the following:   - Healthy Back program   - purpose of the isometric test  - safe progression of lumbar strengthening, wellness approach, and systemic strengthening.   - safe usage of MedX machine and testing protocols.      Assessment     Natalia is a 19 y.o. female referred to [facility] with a medical diagnosis of myofascial Pain. Pt presents with neck and back pain which has been impairing activity for years.    Pain Pattern:         Pt prognosis is Good.     Pt will benefit from skilled outpatient Physical Therapy to address the deficits stated above and in the chart below, to provide pt/family education, and to maximize pt's level of independence. Based on the above history and physical examination an active physical therapy program is recommended.      Plan of care discussed with patient: Yes  Pt's spiritual, cultural and educational needs considered and patient is agreeable to the plan of care and goals as stated below:     Anticipated Barriers for therapy: None    PT Evaluation Completed? No    Medical necessity is demonstrated by the following problem list:    History  Co-morbidities and personal factors that may impact the plan of care [x] LOW: no personal factors / co-morbidities  [] MODERATE: 1-2 personal factors / co-morbidities  [] HIGH: 3+ personal factors / co-morbidities    Moderate / High Support Documentation:   Co-morbidities affecting plan of care:   Past Medical History:   Diagnosis Date    Bronchitis     Diabetes mellitus, type 2        Personal Factors:   no deficits     Examination  Body Structures and Functions, activity limitations and participation restrictions that may impact the plan of care [x] LOW: addressing 1-2 elements  [] MODERATE: 3+ elements  [] HIGH: 4+ elements (please support below)    Moderate / High Support Documentation:     Clinical Presentation [x] LOW: stable  [] MODERATE: Evolving  [] HIGH: Unstable     Decision Making/ Complexity Score: low         GOALS: Pt is in agreement with the following goals.    Short term goals:  6 weeks or 10 visits   - Pt will demonstrate increased lumbar MedX ROM by at least 5 degrees from the initial ROM value with improvements noted in functional ROM and ability to perform ADLs. Appropriate and Ongoing  - Pt will demonstrate increased MedX average isometric strength value by 5% from initial test resulting in improved ability to perform bending, lifting, and  carrying activities safely, confidently. Appropriate and Ongoing  - Pt will report a reduction in worst pain score by 1-2 points for improved tolerance for daily work related activity. Appropriate and Ongoing  - Pt able to perform HEP correctly with minimal cueing or supervision from therapist to encourage independent management of symptoms. Appropriate and Ongoing    Long term goals: 10 weeks or 20 visits   - Pt will demonstrate increased lumbar MedX ROM by at least 10 degrees from initial ROM value, resulting in improved ability to perform functional forward bending while standing and sitting. Appropriate and Ongoing  - Pt will demonstrate increased MedX average isometric strength value by 10% from initial test resulting in improved ability to perform bending, lifting, and carrying activities safely and confidently. Appropriate and OngAoing  - Pt to demonstrate ability to independently control and reduce their pain through posture positioning and mechanical movements throughout a typical day. Appropriate and Ongoing  - Pt will demonstrate independence with the HEP at discharge. Appropriate and Ongoing    Plan     Outpatient physical therapy 2x week for 10 weeks or 20 visits to include the following:   - Patient education  - Therapeutic exercise  - Manual therapy  - Performance testing   - Neuromuscular Re-education  - Therapeutic activity   - Modalities    Pt may be seen by PTA as part of the rehabilitation team.     Therapist: Jesus Iraheta, PT  11/15/2024

## 2024-11-19 ENCOUNTER — CLINICAL SUPPORT (OUTPATIENT)
Dept: REHABILITATION | Facility: HOSPITAL | Age: 19
End: 2024-11-19
Payer: COMMERCIAL

## 2024-11-19 DIAGNOSIS — M54.2 NECK PAIN: Primary | ICD-10-CM

## 2024-11-19 DIAGNOSIS — M54.50 CHRONIC MIDLINE LOW BACK PAIN WITHOUT SCIATICA: ICD-10-CM

## 2024-11-19 DIAGNOSIS — G89.29 CHRONIC MIDLINE LOW BACK PAIN WITHOUT SCIATICA: ICD-10-CM

## 2024-11-19 PROCEDURE — 97750 PHYSICAL PERFORMANCE TEST: CPT | Mod: 32,PN

## 2024-11-22 ENCOUNTER — CLINICAL SUPPORT (OUTPATIENT)
Dept: REHABILITATION | Facility: HOSPITAL | Age: 19
End: 2024-11-22
Payer: COMMERCIAL

## 2024-11-22 DIAGNOSIS — M54.2 NECK PAIN: Primary | ICD-10-CM

## 2024-11-22 DIAGNOSIS — G89.29 CHRONIC MIDLINE LOW BACK PAIN WITHOUT SCIATICA: ICD-10-CM

## 2024-11-22 DIAGNOSIS — M54.50 CHRONIC MIDLINE LOW BACK PAIN WITHOUT SCIATICA: ICD-10-CM

## 2024-11-22 PROCEDURE — 97750 PHYSICAL PERFORMANCE TEST: CPT | Mod: 32,PN

## 2024-11-24 PROBLEM — M54.2 NECK PAIN: Status: ACTIVE | Noted: 2024-11-24

## 2024-11-24 PROBLEM — G89.29 CHRONIC MIDLINE LOW BACK PAIN WITHOUT SCIATICA: Status: ACTIVE | Noted: 2024-11-24

## 2024-11-24 PROBLEM — M54.50 CHRONIC MIDLINE LOW BACK PAIN WITHOUT SCIATICA: Status: ACTIVE | Noted: 2024-11-24

## 2024-11-24 NOTE — PROGRESS NOTES
[facility]  Physical Therapy Treatment Note     Name: Natalia Sue  Clinic Number: [id number]    Therapy Diagnosis:   Encounter Diagnoses   Name Primary?    Neck pain Yes    Chronic midline low back pain without sciatica      Physician: Seven Marina MD    Visit Date: 11/19/2024    Physician Orders: Evaluate and treat - Healthy back  Medical Diagnosis: Myofascial pain  Evaluation Date: 11/15/2024  Authorization Period Expiration: 12/31/2024  Reassessment Due: 1/14/2025  Plan of Care Certification Period: 11/15/2024 to 1/14/2025  Visit #/Visits authorized: 1/20   MedX Testing:MedX testing visit 2    PTA Visit #: 0/5     Time In: 0645   Time Out: 0745  Total Billable Time: 60 minutes  INSURANCE and OUTCOMES: Program Benefit Group with Cervical Outcomes (NDI and AQoL) 1/3    Precautions: standard    Pattern of pain determined:     Subjective     Natalia Sue reports no change in symptoms.      Patient reports tolerating previous visit well  Patient reports their pain to be 4/10 on a 0-10 scale with 0 being no pain and 10 being the worst pain imaginable.  Pain Location: Neck and low back     Work and leisure: She works everyday  Pt goals: to decrease pain    Objective      Cervical  Isometric Testing on Med X equipment: Testing administered by PT    Test Initial Baseline Midpoint Final   Date 11/19/2024     ROM  72 deg     Max Peak Torque  110     Min Peak Torque  50     Flex/Ext Ratio      % variance  normative data -61%     % change from initial test N/A visit 1     Lumbar  Isometric Testing on Med X equipment: Testing administered by PT    Test Initial Baseline Midpoint Final   Date 11/19/2024     ROM  42 deg     Max Peak Torque  77     Min Peak Torque  35     Flex/Ext Ratio      % variance  normative data -63%     % change from initial test N/A visit 1         Outcomes:  Intake Score: 60%  Visit 6 Score:   Visit 10 Score:   Discharge Score:  Goal Score: 69%      Treatment     Natalia received the treatments listed below:      Medical MedX Treatment as follows:  MedX testing performed day 2: Patient  received neuromuscular education to engage spinal musculature correctly for motor control and engagement of musculature for 30 minutes including the MedX exercise component and practice and standard testing. MedX dynamic exercise and baseline isometric test performed with instructions to guide the patient safely through the testing procedure. Patient instructed to perform isometric test correctly and safely while building to an optimal force with a pain-free effort. Patient also instructed that they should feel support/pressure from MedX restraints but no pain/discomfort, and encouraged to report any pain to therapist. Patient demonstrated appropriate understanding of information and tolerance of test.  Education regarding purpose of test, safety during test given, and reviewed possible more soreness and strategies.         Natalia participated in neuromuscular re-education activities to improve balance, coordination, proprioception, motor control and/or posture for 20 minutes. The following activities were included:  MedX rotation 20# x 20  PPT  Jose Alfredo Christianson    Natalia participated in therapeutic exercises to develop strength, ROM, flexibility, posture, and core stabilization for 10 minutes including:    Recumbent bike  Hamstring stretches    Peripheral muscle strengthening which included one set of 15-20 repetitions at a slow and controlled 10-13 second per rep pace focused on strengthening supporting musculature in order to improve body mechanics and functional mobility. Patient and therapist focused on proper form during treatment to ensure optimal strengthening of each targeted muscle group.  Machines utilized included:  To be added next visit:      Patient Education and Home Exercises     Home exercises include:  Bridges  Meghan  Dynamic Hamstring  stretches  Cardio program (V5): -  Lifting education (V11): -  Posture/Lumbar roll:   Fridge Magnet Discharge handout (date given): -  Equipment at home/gym membership: Yes    Education provided:   - PT role and POC  - HEP    Written Home Exercises Provided: Patient instructed to cont prior HEP.  Exercises were reviewed and Natalia was able to demonstrate them prior to the end of the session.  Natalia demonstrated good  understanding of the education provided.     See EMR under Patient Instructions for exercises provided prior visit.    Assessment     Natalia presents to second healthy back visit reporting neck and back pain, was able to demo HEP with Mod VC for form. Pt was able to tolerate Medical MedX machine well as follows:  MedX testing performed and patient tolerated test well.  Pt was also able to complete half of the peripheral strengthening exercises without increased discomfort and will complete the complete circuit next visit as tolerated.    Patient is making good progress towards established goals.  Pt will continue to benefit from skilled outpatient physical therapy to address the deficits stated in the impairment chart, provide pt/family education and to maximize pt's level of independence in the home and community environment.     Anticipated Barriers for therapy: None  Pt's spiritual, cultural and educational needs considered and pt agreeable to plan of care and goals as stated below:     Goals:   Short term goals:  6 weeks or 10 visits   - Pt will demonstrate increased lumbar MedX ROM by at least 5 degrees from the initial ROM value with improvements noted in functional ROM and ability to perform ADLs. Appropriate and Ongoing  - Pt will demonstrate increased MedX average isometric strength value by 5% from initial test resulting in improved ability to perform bending, lifting, and carrying activities safely, confidently. Appropriate and Ongoing  - Pt will report a reduction in worst pain score by 1-2  points for improved tolerance for daily work related activity. Appropriate and Ongoing  - Pt able to perform HEP correctly with minimal cueing or supervision from therapist to encourage independent management of symptoms. Appropriate and Ongoing     Long term goals: 10 weeks or 20 visits   - Pt will demonstrate increased lumbar MedX ROM by at least 10 degrees from initial ROM value, resulting in improved ability to perform functional forward bending while standing and sitting. Appropriate and Ongoing  - Pt will demonstrate increased MedX average isometric strength value by 10% from initial test resulting in improved ability to perform bending, lifting, and carrying activities safely and confidently. Appropriate and OngAoing  - Pt to demonstrate ability to independently control and reduce their pain through posture positioning and mechanical movements throughout a typical day. Appropriate and Ongoing  - Pt will demonstrate independence with the HEP at discharge. Appropriate and Ongoing    Plan     Continue with established Plan of Care towards established PT goals.     Therapist: Jesus Iraheta, PT  11/24/2024

## 2024-11-25 ENCOUNTER — CLINICAL SUPPORT (OUTPATIENT)
Dept: REHABILITATION | Facility: HOSPITAL | Age: 19
End: 2024-11-25
Payer: COMMERCIAL

## 2024-11-25 DIAGNOSIS — M54.50 CHRONIC MIDLINE LOW BACK PAIN WITHOUT SCIATICA: ICD-10-CM

## 2024-11-25 DIAGNOSIS — G89.29 CHRONIC MIDLINE LOW BACK PAIN WITHOUT SCIATICA: ICD-10-CM

## 2024-11-25 DIAGNOSIS — M54.2 NECK PAIN: Primary | ICD-10-CM

## 2024-11-25 PROCEDURE — 97750 PHYSICAL PERFORMANCE TEST: CPT | Mod: 32,PN

## 2024-11-25 NOTE — PROGRESS NOTES
[facility]  Physical Therapy Treatment Note     Name: Natalia Sue  Clinic Number: [id number]    Therapy Diagnosis:   Encounter Diagnoses   Name Primary?    Neck pain Yes    Chronic midline low back pain without sciatica        Physician: Seven Marina MD    Visit Date: 11/22/2024    Physician Orders: Evaluate and treat - Healthy back  Medical Diagnosis: Myofascial pain  Evaluation Date: 11/15/2024  Authorization Period Expiration: 12/31/2024  Reassessment Due: 1/14/2025  Plan of Care Certification Period: 11/15/2024 to 1/14/2025  Visit #/Visits authorized: 1/20   MedX Testing:MedX testing visit 2    PTA Visit #: 0/5     Time In: 1400  Time Out: 1455  Total Billable Time: 55 minutes  INSURANCE and OUTCOMES: Program Benefit Group with Cervical Outcomes (NDI and AQoL) 1/3    Precautions: standard    Pattern of pain determined:     Subjective     Natalia Sue reports no change in symptoms.      Patient reports tolerating previous visit well  Patient reports their pain to be 4/10 on a 0-10 scale with 0 being no pain and 10 being the worst pain imaginable.  Pain Location: Neck and low back     Work and leisure: She works everyday  Pt goals: to decrease pain    Objective      Cervical  Isometric Testing on Med X equipment: Testing administered by PT    Test Initial Baseline Midpoint Final   Date      ROM  deg     Max Peak Torque       Min Peak Torque       Flex/Ext Ratio      % variance  normative data      % change from initial test N/A visit 1     Lumbar  Isometric Testing on Med X equipment: Testing administered by PT    Test Initial Baseline Midpoint Final   Date      ROM  deg     Max Peak Torque       Min Peak Torque       Flex/Ext Ratio      % variance  normative data      % change from initial test N/A visit 1         Outcomes:  Intake Score: 60%  Visit 6 Score:   Visit 10 Score:   Discharge Score:  Goal Score: 69%     Treatment     Natalia received the treatments  listed below:      Natalia participated in neuromuscular re-education activities to improve balance, coordination, proprioception, motor control and/or posture for 30 minutes. The following activities were included:  MedX lumbar extensiom  MedX cervical extension  MedX rotation 20# x 20  PPT  Jose Alfredo Fisher participated in therapeutic exercises to develop strength, ROM, flexibility, posture, and core stabilization for 15 minutes including:    Recumbent bike  Hamstring stretches    Peripheral muscle strengthening which included one set of 15-20 repetitions at a slow and controlled 10-13 second per rep pace focused on strengthening supporting musculature in order to improve body mechanics and functional mobility. Patient and therapist focused on proper form during treatment to ensure optimal strengthening of each targeted muscle group.  Machines utilized included:  To be added next visit:      Patient Education and Home Exercises     Home exercises include:  Jose Alfredo  Clamshells  Dynamic Hamstring stretches  Cardio program (V5): -  Lifting education (V11): -  Posture/Lumbar roll:   Fridge Magnet Discharge handout (date given): -  Equipment at home/gym membership: Yes    Education provided:   - PT role and POC  - HEP    Written Home Exercises Provided: Patient instructed to cont prior HEP.  Exercises were reviewed and Natalia was able to demonstrate them prior to the end of the session.  Natalia demonstrated good  understanding of the education provided.     See EMR under Patient Instructions for exercises provided prior visit.    Assessment     Natalia presents to second healthy back visit reporting neck and back pain, was able to demo HEP with Mod VC for form. Pt was able to tolerate Medical MedX machine well as follows:  MedX testing performed and patient tolerated test well.  Pt was also able to complete half of the peripheral strengthening exercises without increased discomfort and will complete the  complete circuit next visit as tolerated.    Patient is making good progress towards established goals.  Pt will continue to benefit from skilled outpatient physical therapy to address the deficits stated in the impairment chart, provide pt/family education and to maximize pt's level of independence in the home and community environment.     Anticipated Barriers for therapy: None  Pt's spiritual, cultural and educational needs considered and pt agreeable to plan of care and goals as stated below:     Goals:   Short term goals:  6 weeks or 10 visits   - Pt will demonstrate increased lumbar MedX ROM by at least 5 degrees from the initial ROM value with improvements noted in functional ROM and ability to perform ADLs. Appropriate and Ongoing  - Pt will demonstrate increased MedX average isometric strength value by 5% from initial test resulting in improved ability to perform bending, lifting, and carrying activities safely, confidently. Appropriate and Ongoing  - Pt will report a reduction in worst pain score by 1-2 points for improved tolerance for daily work related activity. Appropriate and Ongoing  - Pt able to perform HEP correctly with minimal cueing or supervision from therapist to encourage independent management of symptoms. Appropriate and Ongoing     Long term goals: 10 weeks or 20 visits   - Pt will demonstrate increased lumbar MedX ROM by at least 10 degrees from initial ROM value, resulting in improved ability to perform functional forward bending while standing and sitting. Appropriate and Ongoing  - Pt will demonstrate increased MedX average isometric strength value by 10% from initial test resulting in improved ability to perform bending, lifting, and carrying activities safely and confidently. Appropriate and OngAoing  - Pt to demonstrate ability to independently control and reduce their pain through posture positioning and mechanical movements throughout a typical day. Appropriate and Ongoing  - Pt  will demonstrate independence with the HEP at discharge. Appropriate and Ongoing    Plan     Continue with established Plan of Care towards established PT goals.     Therapist: Jesus Iraheta, PT  11/24/2024

## 2024-11-25 NOTE — PROGRESS NOTES
[facility]  Physical Therapy Treatment Note     Name: Natalia Sue  Clinic Number: [id number]    Therapy Diagnosis:   No diagnosis found.    Physician: Seven Marina MD    Visit Date: 11/25/2024    Physician Orders: Evaluate and treat - Healthy back  Medical Diagnosis: Myofascial pain  Evaluation Date: 11/15/2024  Authorization Period Expiration: 12/31/2024  Reassessment Due: 1/14/2025  Plan of Care Certification Period: 11/15/2024 to 1/14/2025  Visit #/Visits authorized: 1/20   MedX Testing:MedX testing visit 2    PTA Visit #: 0/5     Time In: 0745  Time Out: 0840  Total Billable Time: 55minutes  INSURANCE and OUTCOMES: Program Benefit Group with Cervical Outcomes (NDI and AQoL) 1/3    Precautions: standard    Pattern of pain determined:     Subjective     Natalia Sue reports she currently has no pain doing well    Patient reports tolerating previous visit well  Patient reports their pain to be 4/10 on a 0-10 scale with 0 being no pain and 10 being the worst pain imaginable.  Pain Location: Neck and low back     Work and leisure: She works everyday  Pt goals: to decrease pain    Objective      Cervical  Isometric Testing on Med X equipment: Testing administered by PT    Test Initial Baseline Midpoint Final   Date 11/19/2024     ROM  72 deg     Max Peak Torque  110     Min Peak Torque  50     Flex/Ext Ratio      % variance  normative data -61%     % change from initial test N/A visit 1     Lumbar  Isometric Testing on Med X equipment: Testing administered by PT    Test Initial Baseline Midpoint Final   Date 11/19/2024     ROM  42 deg     Max Peak Torque  77     Min Peak Torque  35     Flex/Ext Ratio      % variance  normative data -63%     % change from initial test N/A visit 1         Outcomes:  Intake Score: 60%  Visit 6 Score:   Visit 10 Score:   Discharge Score:  Goal Score: 69%     Treatment     Natalia received the treatments listed below:        CPT  Intervention  Neck and low back pain Duration / Intensity      X TE Recumbent bike 5 minutes   X  NMR MedX lumbar extension  HB   X  NMR MedX cervical extension  HB   X  NMR MedX trunk rotation  20#x20   X NMR Cable rows  30# 2x10   X TE Hamstring stretch with strap 3x20 seconds   X NMR PPT 2x20   X TE Piriformis stretch 3x20 seconds   X NMR Supine cervical chin tuck      X NMR Open book     X NMR Prone cervical retraction      x  MAN  Manual with thoracic AP springing and scapular mobilization                                                                   PLAN             CPT Codes available for Billing:   (10) minutes of Manual therapy (MT) to improve pain and ROM.  (20) minutes of Therapeutic Exercise (TE) to develop strength, endurance, range of motion, and flexibility.  (25) minutes of Neuromuscular Re-Education (NMR)  to improve: Balance, Coordination, Kinesthetic, Sense, Proprioception, and Posture.  (00) minutes of Therapeutic Activities (TA) to improve functional performance.  Unattended Electrical Stimulation (ES) for muscle performance or pain modulation.  Vasopneumatic Device Therapy () for management of swelling/edema. (66280)  BFR: Blood flow restriction applied during exercise  NP or (-): Not Performed    Patient Education and Home Exercises     Home exercises include:  Bridges  Clamshells  Dynamic Hamstring stretches  Cardio program (V5): -  Lifting education (V11): -  Posture/Lumbar roll:   Fridge Magnet Discharge handout (date given): -  Equipment at home/gym membership: Yes    Education provided:   - PT role and POC  - HEP    Written Home Exercises Provided: Patient instructed to cont prior HEP.  Exercises were reviewed and Natalia was able to demonstrate them prior to the end of the session.  Natalia demonstrated good  understanding of the education provided.     See EMR under Patient Instructions for exercises provided prior visit.    Assessment   The patient is progressing with healthy back  program to improve strength of the lumbar and cervical region and progress range of motion to decrease episodes of pain. Patient will benefit from continue physical therapy.  Natalia presents to second healthy back visit reporting neck and back pain, was able to demo HEP with Mod VC for form. Pt was able to tolerate Medical MedX machine well as follows:  MedX testing performed and patient tolerated test well.  Pt was also able to complete half of the peripheral strengthening exercises without increased discomfort and will complete the complete circuit next visit as tolerated.    Patient is making good progress towards established goals.  Pt will continue to benefit from skilled outpatient physical therapy to address the deficits stated in the impairment chart, provide pt/family education and to maximize pt's level of independence in the home and community environment.     Anticipated Barriers for therapy: None  Pt's spiritual, cultural and educational needs considered and pt agreeable to plan of care and goals as stated below:     Goals:   Short term goals:  6 weeks or 10 visits   - Pt will demonstrate increased lumbar MedX ROM by at least 5 degrees from the initial ROM value with improvements noted in functional ROM and ability to perform ADLs. Appropriate and Ongoing  - Pt will demonstrate increased MedX average isometric strength value by 5% from initial test resulting in improved ability to perform bending, lifting, and carrying activities safely, confidently. Appropriate and Ongoing  - Pt will report a reduction in worst pain score by 1-2 points for improved tolerance for daily work related activity. Appropriate and Ongoing  - Pt able to perform HEP correctly with minimal cueing or supervision from therapist to encourage independent management of symptoms. Appropriate and Ongoing     Long term goals: 10 weeks or 20 visits   - Pt will demonstrate increased lumbar MedX ROM by at least 10 degrees from initial ROM  value, resulting in improved ability to perform functional forward bending while standing and sitting. Appropriate and Ongoing  - Pt will demonstrate increased MedX average isometric strength value by 10% from initial test resulting in improved ability to perform bending, lifting, and carrying activities safely and confidently. Appropriate and OngAoing  - Pt to demonstrate ability to independently control and reduce their pain through posture positioning and mechanical movements throughout a typical day. Appropriate and Ongoing  - Pt will demonstrate independence with the HEP at discharge. Appropriate and Ongoing    Plan     Continue with established Plan of Care towards established PT goals.     Therapist: Jesus Iraheta, PT  11/25/2024      due to dizziness/unable to perform

## 2024-11-29 ENCOUNTER — CLINICAL SUPPORT (OUTPATIENT)
Dept: REHABILITATION | Facility: HOSPITAL | Age: 19
End: 2024-11-29
Payer: COMMERCIAL

## 2024-11-29 DIAGNOSIS — B35.3 TINEA PEDIS OF BOTH FEET: ICD-10-CM

## 2024-11-29 DIAGNOSIS — M54.50 CHRONIC MIDLINE LOW BACK PAIN WITHOUT SCIATICA: Primary | ICD-10-CM

## 2024-11-29 DIAGNOSIS — G89.29 CHRONIC MIDLINE LOW BACK PAIN WITHOUT SCIATICA: Primary | ICD-10-CM

## 2024-11-29 DIAGNOSIS — M54.2 NECK PAIN: ICD-10-CM

## 2024-11-29 DIAGNOSIS — M71.9 BURSITIS OF MULTIPLE SITES: ICD-10-CM

## 2024-11-29 DIAGNOSIS — L98.8 MACERATION OF SKIN: Primary | ICD-10-CM

## 2024-11-29 PROCEDURE — 97750 PHYSICAL PERFORMANCE TEST: CPT | Mod: 32,PN

## 2024-11-29 RX ORDER — PREDNISONE 20 MG/1
20 TABLET ORAL DAILY
Qty: 5 TABLET | Refills: 0 | Status: SHIPPED | OUTPATIENT
Start: 2024-11-29 | End: 2024-12-04

## 2024-11-29 RX ORDER — TERBINAFINE HYDROCHLORIDE 250 MG/1
250 TABLET ORAL DAILY
Qty: 21 TABLET | Refills: 0 | Status: SHIPPED | OUTPATIENT
Start: 2024-11-29 | End: 2024-12-20

## 2024-11-29 RX ORDER — CICLOPIROX OLAMINE 7.7 MG/100ML
SUSPENSION TOPICAL 2 TIMES DAILY
Qty: 30 ML | Refills: 1 | Status: SHIPPED | OUTPATIENT
Start: 2024-11-29

## 2024-11-29 NOTE — PROGRESS NOTES
[facility]  Physical Therapy Treatment Note     Name: Natalia Sue  Clinic Number: [id number]    Therapy Diagnosis:   Encounter Diagnoses   Name Primary?    Chronic midline low back pain without sciatica Yes    Neck pain        Physician: Seven Marina MD    Visit Date: 11/29/2024    Physician Orders: Evaluate and treat - Healthy back  Medical Diagnosis: Myofascial pain  Evaluation Date: 11/15/2024  Authorization Period Expiration: 12/31/2024  Reassessment Due: 1/14/2025  Plan of Care Certification Period: 11/15/2024 to 1/14/2025  Visit #/Visits authorized: 4/20   MedX Testing:MedX testing visit 2    PTA Visit #: 0/5     Time In: 1337  Time Out: 1440  Total Billable Time: 63 minutes  INSURANCE and OUTCOMES: Program Benefit Group with Cervical Outcomes (NDI and AQoL) 1/3    Precautions: standard    Pattern of pain determined:     Subjective     Natalia Sue states she had to go into work on Thanksgiving. She said shes doing OK but the MedX  lumbar extension cost increase pain.    Patient reports tolerating previous visit well  Patient reports their pain to be 4/10 on a 0-10 scale with 0 being no pain and 10 being the worst pain imaginable.  Pain Location: Neck and low back     Work and leisure: She works everyday  Pt goals: to decrease pain    Objective      Cervical  Isometric Testing on Med X equipment: Testing administered by PT    Test Initial Baseline Midpoint Final   Date 11/19/2024     ROM  72 deg     Max Peak Torque  110     Min Peak Torque  50     Flex/Ext Ratio      % variance  normative data -61%     % change from initial test N/A visit 1     Lumbar  Isometric Testing on Med X equipment: Testing administered by PT    Test Initial Baseline Midpoint Final   Date 11/19/2024     ROM  42 deg     Max Peak Torque  77     Min Peak Torque  35     Flex/Ext Ratio      % variance  normative data -63%     % change from initial test N/A visit 1          Outcomes:  Intake Score: 60%  Visit 6 Score:   Visit 10 Score:   Discharge Score:  Goal Score: 69%     Treatment     Natalia received the treatments listed below:        CPT Intervention  Neck and low back pain Duration / Intensity      X TE Recumbent bike 5 minutes   X  NMR MedX lumbar extension  HB   X  NMR MedX cervical extension  HB   X  NMR MedX trunk rotation  20#x20   X NMR Cable rows  30# 2x10   X NMR Standing T Red tubing 3x10   x TE Bridges 3x20 seconds   X TE Hamstring stretch with strap 3x20 seconds   X NMR PPT 2x20     TE Piriformis stretch 3x20 seconds     NMR Supine cervical chin tuck      X NMR Open book     X NMR Prone cervical retraction                                                                             PLAN             CPT Codes available for Billing:   (10) minutes of Manual therapy (MT) to improve pain and ROM.  (25) minutes of Therapeutic Exercise (TE) to develop strength, endurance, range of motion, and flexibility.  (28) minutes of Neuromuscular Re-Education (NMR)  to improve: Balance, Coordination, Kinesthetic, Sense, Proprioception, and Posture.  (00) minutes of Therapeutic Activities (TA) to improve functional performance.  Unattended Electrical Stimulation (ES) for muscle performance or pain modulation.  Vasopneumatic Device Therapy () for management of swelling/edema. (87527)  BFR: Blood flow restriction applied during exercise  NP or (-): Not Performed    Patient Education and Home Exercises     Home exercises include:  Jose Alfredo Christianson  Dynamic Hamstring stretches  Cardio program (V5): -  Lifting education (V11): -  Posture/Lumbar roll:   Fridge Magnet Discharge handout (date given): -  Equipment at home/gym membership: Yes    Education provided:   - PT role and POC  - HEP    Written Home Exercises Provided: Patient instructed to cont prior HEP.  Exercises were reviewed and Natalia was able to demonstrate them prior to the end of the session.  Natalia demonstrated good   understanding of the education provided.     See EMR under Patient Instructions for exercises provided prior visit.    Assessment   The patient is progressing with core exercises and strengthening Of postural and cervical Regions. She has pain within range motions, but tolerating therapy. Well she will benefit continued physical therapy to progress lumbar and cervical strengthening with medic program.    Patient is making good progress towards established goals.  Pt will continue to benefit from skilled outpatient physical therapy to address the deficits stated in the impairment chart, provide pt/family education and to maximize pt's level of independence in the home and community environment.     Anticipated Barriers for therapy: None  Pt's spiritual, cultural and educational needs considered and pt agreeable to plan of care and goals as stated below:     Goals:   Short term goals:  6 weeks or 10 visits   - Pt will demonstrate increased lumbar MedX ROM by at least 5 degrees from the initial ROM value with improvements noted in functional ROM and ability to perform ADLs. Appropriate and Ongoing  - Pt will demonstrate increased MedX average isometric strength value by 5% from initial test resulting in improved ability to perform bending, lifting, and carrying activities safely, confidently. Appropriate and Ongoing  - Pt will report a reduction in worst pain score by 1-2 points for improved tolerance for daily work related activity. Appropriate and Ongoing  - Pt able to perform HEP correctly with minimal cueing or supervision from therapist to encourage independent management of symptoms. Appropriate and Ongoing     Long term goals: 10 weeks or 20 visits   - Pt will demonstrate increased lumbar MedX ROM by at least 10 degrees from initial ROM value, resulting in improved ability to perform functional forward bending while standing and sitting. Appropriate and Ongoing  - Pt will demonstrate increased MedX average  isometric strength value by 10% from initial test resulting in improved ability to perform bending, lifting, and carrying activities safely and confidently. Appropriate and OngAoing  - Pt to demonstrate ability to independently control and reduce their pain through posture positioning and mechanical movements throughout a typical day. Appropriate and Ongoing  - Pt will demonstrate independence with the HEP at discharge. Appropriate and Ongoing    Plan     Continue with established Plan of Care towards established PT goals.     Therapist: Jesus Iraheta, PT  11/29/2024

## 2024-12-02 ENCOUNTER — CLINICAL SUPPORT (OUTPATIENT)
Dept: REHABILITATION | Facility: HOSPITAL | Age: 19
End: 2024-12-02
Payer: COMMERCIAL

## 2024-12-02 DIAGNOSIS — M54.2 NECK PAIN: ICD-10-CM

## 2024-12-02 DIAGNOSIS — M54.50 CHRONIC MIDLINE LOW BACK PAIN WITHOUT SCIATICA: Primary | ICD-10-CM

## 2024-12-02 DIAGNOSIS — G89.29 CHRONIC MIDLINE LOW BACK PAIN WITHOUT SCIATICA: Primary | ICD-10-CM

## 2024-12-02 PROCEDURE — 97750 PHYSICAL PERFORMANCE TEST: CPT | Mod: 32,PN

## 2024-12-03 NOTE — PROGRESS NOTES
[facility]  Physical Therapy Treatment Note     Name: Natalia Sue  Clinic Number: [id number]    Therapy Diagnosis:   Encounter Diagnoses   Name Primary?    Chronic midline low back pain without sciatica Yes    Neck pain        Physician: Seven Marina MD    Visit Date: 12/2/2024    Physician Orders: Evaluate and treat - Healthy back  Medical Diagnosis: Myofascial pain  Evaluation Date: 11/15/2024  Authorization Period Expiration: 12/31/2024  Reassessment Due: 1/14/2025  Plan of Care Certification Period: 11/15/2024 to 1/14/2025  Visit #/Visits authorized: 5/20   MedX Testing:MedX testing visit 2    PTA Visit #: 0/5     Time In: 0730  Time Out: 0825  Total Billable Time:55 minutes  INSURANCE and OUTCOMES: Program Benefit Group with Cervical Outcomes (NDI and AQoL) 1/3    Precautions: standard    Pattern of pain determined:     Subjective     Natalia Sue She did clean house over the weekend and had to stop due to tightness. She does complain of pain which starts to run down her leg while using the MedX extension.    Patient reports tolerating previous visit well  Patient reports their pain to be 4/10 on a 0-10 scale with 0 being no pain and 10 being the worst pain imaginable.  Pain Location: Neck and low back     Work and leisure: She works everyday  Pt goals: to decrease pain    Objective      Cervical  Isometric Testing on Med X equipment: Testing administered by PT    Test Initial Baseline Midpoint Final   Date 11/19/2024     ROM  72 deg     Max Peak Torque  110     Min Peak Torque  50     Flex/Ext Ratio      % variance  normative data -61%     % change from initial test N/A visit 1     Lumbar  Isometric Testing on Med X equipment: Testing administered by PT    Test Initial Baseline Midpoint Final   Date 11/19/2024     ROM  42 deg     Max Peak Torque  77     Min Peak Torque  35     Flex/Ext Ratio      % variance  normative data -63%     % change from initial  test N/A visit 1         Outcomes:  Intake Score: 60%  Visit 6 Score:   Visit 10 Score:   Discharge Score:  Goal Score: 69%     Treatment     Natalia received the treatments listed below:        CPT Intervention  Neck and low back pain Duration / Intensity      X TE Recumbent bike 5 minutes   X  NMR MedX lumbar extension  HB      NMR MedX cervical extension  HB   X  NMR MedX trunk rotation  20#x20     NMR Cable rows  30# 2x10   X NMR Standing T Red tubing 3x10   X TE Bridges 3x20 seconds   X TE Hamstring stretch with strap 3x20 seconds   X NMR PPT 2x20   X TE Piriformis stretch 3x20 seconds     NMR Supine cervical chin tuck        NMR Open book       NMR Prone cervical retraction                                                                             PLAN             CPT Codes available for Billing:   (00) minutes of Manual therapy (MT) to improve pain and ROM.  (25) minutes of Therapeutic Exercise (TE) to develop strength, endurance, range of motion, and flexibility.  (30) minutes of Neuromuscular Re-Education (NMR)  to improve: Balance, Coordination, Kinesthetic, Sense, Proprioception, and Posture.  (00) minutes of Therapeutic Activities (TA) to improve functional performance.  Unattended Electrical Stimulation (ES) for muscle performance or pain modulation.  Vasopneumatic Device Therapy () for management of swelling/edema. (89987)  BFR: Blood flow restriction applied during exercise  NP or (-): Not Performed    Patient Education and Home Exercises     Home exercises include:  Bridges  Meghan  Dynamic Hamstring stretches  Cardio program (V5): -  Lifting education (V11): -  Posture/Lumbar roll:   Fridge Magnet Discharge handout (date given): -  Equipment at home/gym membership: Yes    Education provided:   - PT role and POC  - HEP    Written Home Exercises Provided: Patient instructed to cont prior HEP.  Exercises were reviewed and Natalia was able to demonstrate them prior to the end of the session.   Chantellia demonstrated good  understanding of the education provided.     See EMR under Patient Instructions for exercises provided prior visit.    Assessment   The patient is progressing with core exercises and strengthening  of postural and cervical regions. She said shes not interested in dry needling program and will continue from progressive physical therapy.    Patient is making good progress towards established goals.  Pt will continue to benefit from skilled outpatient physical therapy to address the deficits stated in the impairment chart, provide pt/family education and to maximize pt's level of independence in the home and community environment.     Anticipated Barriers for therapy: None  Pt's spiritual, cultural and educational needs considered and pt agreeable to plan of care and goals as stated below:     Goals:   Short term goals:  6 weeks or 10 visits   - Pt will demonstrate increased lumbar MedX ROM by at least 5 degrees from the initial ROM value with improvements noted in functional ROM and ability to perform ADLs. Appropriate and Ongoing  - Pt will demonstrate increased MedX average isometric strength value by 5% from initial test resulting in improved ability to perform bending, lifting, and carrying activities safely, confidently. Appropriate and Ongoing  - Pt will report a reduction in worst pain score by 1-2 points for improved tolerance for daily work related activity. Appropriate and Ongoing  - Pt able to perform HEP correctly with minimal cueing or supervision from therapist to encourage independent management of symptoms. Appropriate and Ongoing     Long term goals: 10 weeks or 20 visits   - Pt will demonstrate increased lumbar MedX ROM by at least 10 degrees from initial ROM value, resulting in improved ability to perform functional forward bending while standing and sitting. Appropriate and Ongoing  - Pt will demonstrate increased MedX average isometric strength value by 10% from initial  test resulting in improved ability to perform bending, lifting, and carrying activities safely and confidently. Appropriate and OngAoing  - Pt to demonstrate ability to independently control and reduce their pain through posture positioning and mechanical movements throughout a typical day. Appropriate and Ongoing  - Pt will demonstrate independence with the HEP at discharge. Appropriate and Ongoing    Plan     Continue with established Plan of Care towards established PT goals.     Therapist: Jeuss Iraheta, PT  12/3/2024

## 2024-12-04 NOTE — PROGRESS NOTES
[facility]  Physical Therapy Treatment Note     Name: Natalia Sue  Clinic Number: [id number]    Therapy Diagnosis:   No diagnosis found.      Physician: Seven Marina MD    Visit Date: 12/6/2024    Physician Orders: Evaluate and treat - Healthy back  Medical Diagnosis: Myofascial pain  Evaluation Date: 11/15/2024  Authorization Period Expiration: 12/31/2024  Reassessment Due: 1/14/2025  Plan of Care Certification Period: 11/15/2024 to 1/14/2025  Visit #/Visits authorized: 5/20   MedX Testing:MedX testing visit 2    PTA Visit #: 0/5     Time In: 1416  Time Out: 1500  Total Billable Time:44 minutes  INSURANCE and OUTCOMES: Program Benefit Group with Cervical Outcomes (NDI and AQoL) 1/3    Precautions: standard    Pattern of pain determined:     Subjective     Natalia Sue she rose difficulty with back extension    Patient reports tolerating previous visit well  Patient reports their pain to be 4/10 on a 0-10 scale with 0 being no pain and 10 being the worst pain imaginable.  Pain Location: Neck and low back     Work and leisure: She works everyday  Pt goals: to decrease pain    Objective      Cervical  Isometric Testing on Med X equipment: Testing administered by PT    Test Initial Baseline Midpoint Final   Date 11/19/2024     ROM  72 deg     Max Peak Torque  110     Min Peak Torque  50     Flex/Ext Ratio      % variance  normative data -61%     % change from initial test N/A visit 1     Lumbar  Isometric Testing on Med X equipment: Testing administered by PT    Test Initial Baseline Midpoint Final   Date 11/19/2024     ROM  42 deg     Max Peak Torque  77     Min Peak Torque  35     Flex/Ext Ratio      % variance  normative data -63%     % change from initial test N/A visit 1         Outcomes:  Intake Score: 60%  Visit 6 Score:   Visit 10 Score:   Discharge Score:  Goal Score: 69%     Treatment     Natalia received the treatments listed below:        CPT  Intervention  Neck and low back pain Duration / Intensity      X TE Recumbent bike 5 minutes   X  NMR MedX lumbar extension  HB      NMR MedX cervical extension  HB   X  NMR MedX trunk rotation  20#x20     NMR Cable rows  30# 2x10   X NMR Standing T Red tubing 3x10   X TE Bridges 3x20 seconds   X TE Hamstring stretch with strap 3x20 seconds   X NMR PPT 2x20   X TE Piriformis stretch 3x20 seconds    x NMR Supine cervical chin tuck       x NMR Open book      x NMR Prone cervical retraction                                                                             PLAN             CPT Codes available for Billing:   (10) minutes of Manual therapy (MT) to improve pain and ROM.  (25) minutes of Therapeutic Exercise (TE) to develop strength, endurance, range of motion, and flexibility.  (28) minutes of Neuromuscular Re-Education (NMR)  to improve: Balance, Coordination, Kinesthetic, Sense, Proprioception, and Posture.  (00) minutes of Therapeutic Activities (TA) to improve functional performance.  Unattended Electrical Stimulation (ES) for muscle performance or pain modulation.  Vasopneumatic Device Therapy () for management of swelling/edema. (16061)  BFR: Blood flow restriction applied during exercise  NP or (-): Not Performed    Patient Education and Home Exercises     Home exercises include:  Bridges  Clamshells  Dynamic Hamstring stretches  Cardio program (V5): -  Lifting education (V11): -  Posture/Lumbar roll:   Fridge Magnet Discharge handout (date given): -  Equipment at home/gym membership: Yes    Education provided:   - PT role and POC  - HEP    Written Home Exercises Provided: Patient instructed to cont prior HEP.  Exercises were reviewed and Natalia was able to demonstrate them prior to the end of the session.  Natalia demonstrated good  understanding of the education provided.     See EMR under Patient Instructions for exercises provided prior visit.    Assessment   The patient is progressing with core  exercises and strengthening  of postural and cervical regions. She said shes is interested in dry needling program and will continue from progressive physical therapy.    Patient is making good progress towards established goals.  Pt will continue to benefit from skilled outpatient physical therapy to address the deficits stated in the impairment chart, provide pt/family education and to maximize pt's level of independence in the home and community environment.     Anticipated Barriers for therapy: None  Pt's spiritual, cultural and educational needs considered and pt agreeable to plan of care and goals as stated below:     Goals:   Short term goals:  6 weeks or 10 visits   - Pt will demonstrate increased lumbar MedX ROM by at least 5 degrees from the initial ROM value with improvements noted in functional ROM and ability to perform ADLs. Appropriate and Ongoing  - Pt will demonstrate increased MedX average isometric strength value by 5% from initial test resulting in improved ability to perform bending, lifting, and carrying activities safely, confidently. Appropriate and Ongoing  - Pt will report a reduction in worst pain score by 1-2 points for improved tolerance for daily work related activity. Appropriate and Ongoing  - Pt able to perform HEP correctly with minimal cueing or supervision from therapist to encourage independent management of symptoms. Appropriate and Ongoing     Long term goals: 10 weeks or 20 visits   - Pt will demonstrate increased lumbar MedX ROM by at least 10 degrees from initial ROM value, resulting in improved ability to perform functional forward bending while standing and sitting. Appropriate and Ongoing  - Pt will demonstrate increased MedX average isometric strength value by 10% from initial test resulting in improved ability to perform bending, lifting, and carrying activities safely and confidently. Appropriate and OngAoing  - Pt to demonstrate ability to independently control and  reduce their pain through posture positioning and mechanical movements throughout a typical day. Appropriate and Ongoing  - Pt will demonstrate independence with the HEP at discharge. Appropriate and Ongoing    Plan     Continue with established Plan of Care towards established PT goals.     Therapist: Jesus Iraheta, PT  12/8/2024

## 2024-12-06 ENCOUNTER — CLINICAL SUPPORT (OUTPATIENT)
Dept: REHABILITATION | Facility: HOSPITAL | Age: 19
End: 2024-12-06
Payer: COMMERCIAL

## 2024-12-06 DIAGNOSIS — G89.29 CHRONIC MIDLINE LOW BACK PAIN WITHOUT SCIATICA: Primary | ICD-10-CM

## 2024-12-06 DIAGNOSIS — M54.2 NECK PAIN: ICD-10-CM

## 2024-12-06 DIAGNOSIS — M54.50 CHRONIC MIDLINE LOW BACK PAIN WITHOUT SCIATICA: Primary | ICD-10-CM

## 2024-12-06 PROCEDURE — 97750 PHYSICAL PERFORMANCE TEST: CPT | Mod: 32,PN

## 2024-12-09 ENCOUNTER — CLINICAL SUPPORT (OUTPATIENT)
Dept: REHABILITATION | Facility: HOSPITAL | Age: 19
End: 2024-12-09
Payer: COMMERCIAL

## 2024-12-09 DIAGNOSIS — G89.29 CHRONIC MIDLINE LOW BACK PAIN WITHOUT SCIATICA: Primary | ICD-10-CM

## 2024-12-09 DIAGNOSIS — M54.9 BACK PAIN, UNSPECIFIED BACK LOCATION, UNSPECIFIED BACK PAIN LATERALITY, UNSPECIFIED CHRONICITY: Primary | ICD-10-CM

## 2024-12-09 DIAGNOSIS — G89.29 CHRONIC MYOFASCIAL PAIN: ICD-10-CM

## 2024-12-09 DIAGNOSIS — M79.18 CHRONIC MYOFASCIAL PAIN: ICD-10-CM

## 2024-12-09 DIAGNOSIS — M54.50 CHRONIC MIDLINE LOW BACK PAIN WITHOUT SCIATICA: Primary | ICD-10-CM

## 2024-12-09 DIAGNOSIS — M54.2 NECK PAIN: ICD-10-CM

## 2024-12-09 PROCEDURE — 97750 PHYSICAL PERFORMANCE TEST: CPT | Mod: 32,PN

## 2024-12-09 NOTE — PROGRESS NOTES
[facility] OUTPATIENT THERAPY AND WELLNESS - HEALTHY BACK  Physical Therapy Treatment Note     Name: Natalia Sue  Clinic Number: [id number]    Therapy Diagnosis:   Encounter Diagnoses   Name Primary?    Chronic midline low back pain without sciatica Yes    Neck pain      Physician: Seven Marina MD    Visit Date: 12/9/2024    Physician Orders: Evaluate and treat - Healthy back  Medical Diagnosis: Myofascial pain  Evaluation Date: 11/15/2024  Authorization Period Expiration: 12/31/2024  Reassessment Due: 1/14/2025  Plan of Care Certification Period: 11/15/2024 to 1/14/2025  Visit #/Visits authorized: 5/20   MedX Testing:MedX testing visit 2    PTA Visit #: 0/5     Time In: 1416  Time Out: 1500  Total Billable Time:44 minutes  INSURANCE and OUTCOMES: Program Benefit Group with Cervical Outcomes (NDI and AQoL) 1/3    Precautions: standard    Pattern of pain determined:     Subjective     Natalia Sue she is nervous over dry needling.  No back pain with MedX lumbar extension.    Patient reports tolerating previous visit well  Patient reports their pain to be 4/10 on a 0-10 scale with 0 being no pain and 10 being the worst pain imaginable.  Pain Location: Neck and low back     Work and leisure: She works everyday  Pt goals: to decrease pain    Objective      Cervical  Isometric Testing on Med X equipment: Testing administered by PT    Test Initial Baseline Midpoint Final   Date 11/19/2024     ROM  72 deg     Max Peak Torque  110     Min Peak Torque  50     Flex/Ext Ratio      % variance  normative data -61%     % change from initial test N/A visit 1     Lumbar  Isometric Testing on Med X equipment: Testing administered by PT    Test Initial Baseline Midpoint Final   Date 11/19/2024     ROM  42 deg     Max Peak Torque  77     Min Peak Torque  35     Flex/Ext Ratio      % variance  normative data -63%     % change from initial test N/A visit 1         Outcomes:  Intake Score: 60%  Visit 6 Score:   Visit 10 Score:    Discharge Score:  Goal Score: 69%     Treatment     Shrachelle received the treatments listed below:        CPT Intervention  Neck and low back pain Duration / Intensity      X TE Recumbent bike  5 minutes   X  NMR MedX lumbar extension  HB   X  NMR MedX cervical extension  HB      NMR MedX trunk rotation  20#x20   X NMR Cable rows  30# 2x10   X NMR Standing T Red tubing 3x10     TE Bridges 3x20 seconds     TE Hamstring stretch with strap 3x20 seconds     NMR PPT 2x20     TE Piriformis stretch 3x20 seconds     NMR Supine cervical chin tuck        NMR Open book       NMR Prone cervical retraction       MAN Dry Needling                                                                   PLAN             CPT Codes available for Billing:   (25) minutes of Manual therapy (MT) to improve pain and ROM.  (00) minutes of Therapeutic Exercise (TE) to develop strength, endurance, range of motion, and flexibility.  (30) minutes of Neuromuscular Re-Education (NMR)  to improve: Balance, Coordination, Kinesthetic, Sense, Proprioception, and Posture.  (00) minutes of Therapeutic Activities (TA) to improve functional performance.  Unattended Electrical Stimulation (ES) for muscle performance or pain modulation.  Palpation Assessment to determine the necessity for Functional Dry Needling - Yes   Patient provided written and verbal consent to Functional Dry Needling- Yes  Natalia received the following manual therapy techniques: Palpation of trigger points and bony landmarks,  STM     Written Handout on response to FDN provided: Yes   Natalia demonstrated good  understanding of the education provided.      Patient demonstrated appropriate response to Functional Dry Needling. good rhythmical contractions observed with estim to treated muscle groups.   Lumbar paraspinal muscles and upper traps     Patient Education and Home Exercises     Home exercises include:  Bridges  Clamshells  Dynamic Hamstring stretches  Cardio program (V5): -  Lifting  education (V11): -  Posture/Lumbar roll:   Fridge Magnet Discharge handout (date given): -  Equipment at home/gym membership: Yes    Education provided:   - PT role and POC  - HEP    Written Home Exercises Provided: Patient instructed to cont prior HEP.  Exercises were reviewed and Natalia was able to demonstrate them prior to the end of the session.  Natalia demonstrated good  understanding of the education provided.     See EMR under Patient Instructions for exercises provided prior visit.    Assessment   The patient is progressing with core exercises and strengthening  of postural and cervical regions. She had benefit during exercises from lumbar dry needling.  She will benefit from progressive PT    Patient is making good progress towards established goals.  Pt will continue to benefit from skilled outpatient physical therapy to address the deficits stated in the impairment chart, provide pt/family education and to maximize pt's level of independence in the home and community environment.     Anticipated Barriers for therapy: None  Pt's spiritual, cultural and educational needs considered and pt agreeable to plan of care and goals as stated below:     Goals:   Short term goals:  6 weeks or 10 visits   - Pt will demonstrate increased lumbar MedX ROM by at least 5 degrees from the initial ROM value with improvements noted in functional ROM and ability to perform ADLs. Appropriate and Ongoing  - Pt will demonstrate increased MedX average isometric strength value by 5% from initial test resulting in improved ability to perform bending, lifting, and carrying activities safely, confidently. Appropriate and Ongoing  - Pt will report a reduction in worst pain score by 1-2 points for improved tolerance for daily work related activity. Appropriate and Ongoing  - Pt able to perform HEP correctly with minimal cueing or supervision from therapist to encourage independent management of symptoms. Appropriate and Ongoing     Long  term goals: 10 weeks or 20 visits   - Pt will demonstrate increased lumbar MedX ROM by at least 10 degrees from initial ROM value, resulting in improved ability to perform functional forward bending while standing and sitting. Appropriate and Ongoing  - Pt will demonstrate increased MedX average isometric strength value by 10% from initial test resulting in improved ability to perform bending, lifting, and carrying activities safely and confidently. Appropriate and OngAoing  - Pt to demonstrate ability to independently control and reduce their pain through posture positioning and mechanical movements throughout a typical day. Appropriate and Ongoing  - Pt will demonstrate independence with the HEP at discharge. Appropriate and Ongoing    Plan     Continue with established Plan of Care towards established PT goals.     Therapist: Jesus Iraheta, PT  12/9/2024

## 2024-12-13 ENCOUNTER — OFFICE VISIT (OUTPATIENT)
Dept: INTERNAL MEDICINE | Facility: CLINIC | Age: 19
End: 2024-12-13
Payer: COMMERCIAL

## 2024-12-13 ENCOUNTER — PATIENT MESSAGE (OUTPATIENT)
Dept: OTHER | Facility: OTHER | Age: 19
End: 2024-12-13
Payer: COMMERCIAL

## 2024-12-13 ENCOUNTER — OFFICE VISIT (OUTPATIENT)
Dept: OBSTETRICS AND GYNECOLOGY | Facility: CLINIC | Age: 19
End: 2024-12-13
Payer: COMMERCIAL

## 2024-12-13 ENCOUNTER — HOSPITAL ENCOUNTER (OUTPATIENT)
Dept: RADIOLOGY | Facility: HOSPITAL | Age: 19
Discharge: HOME | End: 2024-12-13
Attending: NURSE PRACTITIONER
Payer: COMMERCIAL

## 2024-12-13 ENCOUNTER — CLINICAL SUPPORT (OUTPATIENT)
Dept: REHABILITATION | Facility: HOSPITAL | Age: 19
End: 2024-12-13
Payer: COMMERCIAL

## 2024-12-13 ENCOUNTER — TELEPHONE (OUTPATIENT)
Dept: OBSTETRICS AND GYNECOLOGY | Facility: CLINIC | Age: 19
End: 2024-12-13

## 2024-12-13 ENCOUNTER — PATIENT MESSAGE (OUTPATIENT)
Dept: INTERNAL MEDICINE | Facility: CLINIC | Age: 19
End: 2024-12-13

## 2024-12-13 VITALS
DIASTOLIC BLOOD PRESSURE: 72 MMHG | HEIGHT: 57 IN | WEIGHT: 139.13 LBS | BODY MASS INDEX: 30.02 KG/M2 | SYSTOLIC BLOOD PRESSURE: 116 MMHG

## 2024-12-13 VITALS
SYSTOLIC BLOOD PRESSURE: 110 MMHG | HEIGHT: 57 IN | HEART RATE: 98 BPM | OXYGEN SATURATION: 100 % | DIASTOLIC BLOOD PRESSURE: 78 MMHG | BODY MASS INDEX: 30.02 KG/M2 | WEIGHT: 139.13 LBS | TEMPERATURE: 98 F

## 2024-12-13 DIAGNOSIS — B35.4 TINEA CORPORIS: ICD-10-CM

## 2024-12-13 DIAGNOSIS — G89.29 CHRONIC MYOFASCIAL PAIN: ICD-10-CM

## 2024-12-13 DIAGNOSIS — M79.18 CHRONIC MYOFASCIAL PAIN: ICD-10-CM

## 2024-12-13 DIAGNOSIS — R10.30 LOWER ABDOMINAL PAIN: ICD-10-CM

## 2024-12-13 DIAGNOSIS — Z01.419 WELL WOMAN EXAM WITH ROUTINE GYNECOLOGICAL EXAM: ICD-10-CM

## 2024-12-13 DIAGNOSIS — M54.50 CHRONIC MIDLINE LOW BACK PAIN WITHOUT SCIATICA: ICD-10-CM

## 2024-12-13 DIAGNOSIS — G89.29 CHRONIC MIDLINE LOW BACK PAIN WITHOUT SCIATICA: ICD-10-CM

## 2024-12-13 DIAGNOSIS — Z30.011 OCP (ORAL CONTRACEPTIVE PILLS) INITIATION: ICD-10-CM

## 2024-12-13 DIAGNOSIS — N39.41 URGE INCONTINENCE OF URINE: ICD-10-CM

## 2024-12-13 DIAGNOSIS — N39.44 NOCTURNAL ENURESIS: ICD-10-CM

## 2024-12-13 DIAGNOSIS — R31.29 OTHER MICROSCOPIC HEMATURIA: ICD-10-CM

## 2024-12-13 DIAGNOSIS — N89.8 VAGINAL DRYNESS: Primary | ICD-10-CM

## 2024-12-13 DIAGNOSIS — M54.9 BACK PAIN, UNSPECIFIED BACK LOCATION, UNSPECIFIED BACK PAIN LATERALITY, UNSPECIFIED CHRONICITY: ICD-10-CM

## 2024-12-13 DIAGNOSIS — R10.30 LOWER ABDOMINAL PAIN: Primary | ICD-10-CM

## 2024-12-13 PROCEDURE — 99999 PR PBB SHADOW E&M-EST. PATIENT-LVL IV: CPT | Mod: PBBFAC,,, | Performed by: NURSE PRACTITIONER

## 2024-12-13 PROCEDURE — 74019 RADEX ABDOMEN 2 VIEWS: CPT | Mod: 26,,, | Performed by: RADIOLOGY

## 2024-12-13 PROCEDURE — 97750 PHYSICAL PERFORMANCE TEST: CPT | Mod: 32,PN

## 2024-12-13 PROCEDURE — 74019 RADEX ABDOMEN 2 VIEWS: CPT | Mod: TC

## 2024-12-13 PROCEDURE — 99999 PR PBB SHADOW E&M-EST. PATIENT-LVL IV: CPT | Mod: PBBFAC,,,

## 2024-12-13 RX ORDER — KETOCONAZOLE 20 MG/G
CREAM TOPICAL 2 TIMES DAILY
Qty: 15 G | Refills: 0 | Status: SHIPPED | OUTPATIENT
Start: 2024-12-13

## 2024-12-13 RX ORDER — NORGESTIMATE AND ETHINYL ESTRADIOL 7DAYSX3 28
1 KIT ORAL DAILY
Qty: 28 TABLET | Refills: 11 | Status: SHIPPED | OUTPATIENT
Start: 2024-12-13 | End: 2025-12-13

## 2024-12-13 RX ORDER — KETOCONAZOLE 20 MG/G
CREAM TOPICAL DAILY
Qty: 15 G | Refills: 3 | Status: SHIPPED | OUTPATIENT
Start: 2024-12-13

## 2024-12-13 RX ORDER — KETOCONAZOLE 20 MG/G
CREAM TOPICAL DAILY
Qty: 30 G | Refills: 0 | Status: SHIPPED | OUTPATIENT
Start: 2024-12-13 | End: 2024-12-13

## 2024-12-13 NOTE — PROGRESS NOTES
Subjective:       Patient ID: Natalia Sue is a 19 y.o. female.    Chief Complaint:  Vaginal Atrophy      History of Present Illness  HPI    Patient presents with complaints of vaginal dryness during intercourse with her partner   She has been intimate with her female partner for 2 years and still having trouble   She has been using Rephresh vaginal moisturizer and they use KY water based lubrication, it helps but she would rather not use it   Despite foreplay and the use of toys she feels she still does not achieve adequate lubrication   Admits not having this issue with previous male partners     GYN & OB History  Patient's last menstrual period was 2024 (exact date).   Date of Last Pap: No result found    OB History    Para Term  AB Living   0 0 0 0 0 0   SAB IAB Ectopic Multiple Live Births   0 0 0 0 0       Review of Systems  Review of Systems   Constitutional: Negative.    HENT: Negative.     Eyes: Negative.    Respiratory: Negative.     Cardiovascular: Negative.    Gastrointestinal: Negative.    Genitourinary:  Positive for dyspareunia and vaginal dryness.   Musculoskeletal: Negative.    Integumentary:  Negative.   Neurological: Negative.    Hematological: Negative.    Psychiatric/Behavioral: Negative.     All other systems reviewed and are negative.  Breast: negative.            Objective:      Physical Exam:   Constitutional: She is oriented to person, place, and time. She appears well-developed and well-nourished. No distress.    HENT:   Head: Normocephalic and atraumatic.    Eyes: Pupils are equal, round, and reactive to light. Conjunctivae and EOM are normal.     Cardiovascular:  Normal rate.             Pulmonary/Chest: Effort normal.        Abdominal: Soft. She exhibits no distension. There is no abdominal tenderness. There is no rebound and no guarding. Hernia confirmed negative in the right inguinal area and confirmed negative in the left inguinal area.     Genitourinary:     Inguinal canal, vagina, uterus, right adnexa and left adnexa normal.   Rectum:      No external hemorrhoid.      Pelvic exam was performed with patient supine.   The external female genitalia was normal.   No external genitalia lesions identified,Genitalia hair distrobution normal .     Labial bartholins normal.There is no rash, tenderness, lesion or injury on the right labia. There is no rash, tenderness, lesion or injury on the left labia. Cervix is normal. Right adnexum displays no mass, no tenderness and no fullness. Left adnexum displays no mass, no tenderness and no fullness. No erythema, vaginal discharge, tenderness or bleeding in the vagina.    No foreign body in the vagina.      No signs of injury in the vagina.   Vagina was moist.Cervix exhibits no motion tenderness, no lesion, no friability, no tenderness and no polyp. Uerus contour normal  Uterus is not enlarged and not tender. Normal urethral meatus.Urethral Meatus exhibits: no urethral lesionUrethra findings: no urethral mass, no tenderness, no urethral scarring and no prolapsedBladder findings: no bladder distention and no bladder tenderness          Musculoskeletal: Normal range of motion and moves all extremeties.      Lymphadenopathy: No inguinal adenopathy noted on the right or left side.    Neurological: She is alert and oriented to person, place, and time.    Skin: Skin is warm and dry. No rash noted. She is not diaphoretic. No erythema. No pallor.    Psychiatric: She has a normal mood and affect. Her behavior is normal. Judgment and thought content normal.             Assessment:        1. Vaginal dryness    2. OCP (oral contraceptive pills) initiation               Plan:   Continue annual well woman exam.  Patient aware pap smear due at 20       Diagnosis and orders this visit:  Vaginal dryness  -     Ambulatory referral/consult to Gynecology   - May use over the counter vaginal suppositories for day to day relief from vaginal dryness and  irritation. Encouraged to try pH-D brand vaginal gel     OCP (oral contraceptive pills) initiation  -     norgestimate-ethinyl estradioL (ORTHO TRI-CYCLEN,TRI-SPRINTEC) 0.18/0.215/0.25 mg-35 mcg (28) tablet; Take 1 tablet by mouth once daily.  Dispense: 28 tablet; Refill: 11   - The use of the oral contraceptive has been fully discussed with the patient. This includes the proper method to initiate and continue the pills, the need for regular compliance to ensure adequate contraceptive effect, the physiology which make the pill effective, the instructions for what to do in event of a missed pill, and warnings about anticipated minor side effects such as breakthrough spotting, nausea, breast tenderness, weight changes, acne, headaches, etc.  She has been told of the more serious potential side effects such as MI, stroke, and deep vein thrombosis, all of which are very unlikely.  She has been asked to report any signs of such serious problems immediately.  She should back up the pill with a condom during any cycle in which antibiotics are prescribed, and during the first cycle as well. The need for additional protection, such as a condom, to prevent exposure to sexually transmitted diseases has also been discussed- the patient has been clearly reminded that OCP's cannot protect her against diseases such as HIV and others. She understands and wishes to take the medication as prescribed.        Mahi Harrison NP

## 2024-12-13 NOTE — PROGRESS NOTES
Subjective:      Patient ID: Natalia Sue is a 19 y.o. female.    Chief Complaint: Abdominal Pain    History of Present Illness    CHIEF COMPLAINT:  Natalia presents today with abdominal pain.    ABDOMINAL PAIN:  She reports abdominal pain that started yesterday, rated 4/10 and worsens with movement. She denies nausea, vomiting, or fever.    MUSCULOSKELETAL:  She experiences neck and back pain, currently attending physical therapy twice weekly on Mondays and Fridays. 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    GENITOURINARY:  She experiences urinary incontinence during sleep and occasionally without warning.      ROS:  General: -fever, -chills, -fatigue, -weight gain, -weight loss  Eyes: -vision changes, -redness, -discharge  ENT: -ear pain, -nasal congestion, -sore throat  Cardiovascular: -chest pain, -palpitations, -lower extremity edema  Respiratory: -cough, -shortness of breath  Gastrointestinal: +abdominal pain, -nausea, -vomiting, -diarrhea, -constipation, -blood in stool  Genitourinary: -dysuria, -hematuria, -frequency, +urinary incontinence  Musculoskeletal: -joint pain, -muscle pain, +back pain  Skin: -rash, -lesion  Neurological: -headache, -dizziness, -numbness, -tingling  Psychiatric: -anxiety, -depression, -sleep difficulty          Patient Active Problem List   Diagnosis    Chronic midline low back pain without sciatica    Neck pain         Current Outpatient Medications:     buPROPion (WELLBUTRIN XL) 300 MG 24 hr tablet, Take 1 tablet (300 mg total) by mouth once daily., Disp: 90 tablet, Rfl: 1    ciclopirox (LOPROX) 0.77 % Susp, Apply topically 2 (two) times daily., Disp: 30 mL, Rfl: 1    ferrous sulfate (FEOSOL) 325 mg (65 mg iron) Tab tablet, Take 325 mg by mouth once daily., Disp: , Rfl:     ketoconazole (NIZORAL) 2 % shampoo, Apply topically twice a week, Disp: 120 mL, Rfl: 5    terbinafine HCL (LAMISIL) 250 mg  "tablet, Take 1 tablet (250 mg total) by mouth once daily. for 21 days, Disp: 21 tablet, Rfl: 0    triamcinolone acetonide 0.1% (KENALOG) 0.1 % ointment, Apply topically 2 (two) times daily., Disp: 30 g, Rfl: 3    ketoconazole (NIZORAL) 2 % cream, Apply topically once daily., Disp: 30 g, Rfl: 0      Objective:   /78 (BP Location: Right arm, Patient Position: Sitting)   Pulse 98   Temp 97.8 °F (36.6 °C) (Tympanic)   Ht 4' 9" (1.448 m)   Wt 63.1 kg (139 lb 1.8 oz)   LMP 11/16/2024 (Exact Date)   SpO2 100%   BMI 30.10 kg/m²     Physical Exam             Physical Exam  Vitals and nursing note reviewed.   Constitutional:       General: She is awake. She is not in acute distress.     Appearance: Normal appearance. She is well-developed and well-groomed. She is not ill-appearing, toxic-appearing or diaphoretic.   HENT:      Head: Normocephalic and atraumatic.      Right Ear: External ear normal.      Left Ear: External ear normal.   Eyes:      Conjunctiva/sclera: Conjunctivae normal.      Pupils: Pupils are equal, round, and reactive to light.   Cardiovascular:      Rate and Rhythm: Normal rate and regular rhythm.      Heart sounds: Normal heart sounds. No murmur heard.  Pulmonary:      Effort: Pulmonary effort is normal. No tachypnea, bradypnea, accessory muscle usage, prolonged expiration, respiratory distress or retractions.      Breath sounds: Normal breath sounds. No stridor, decreased air movement or transmitted upper airway sounds. No decreased breath sounds, wheezing, rhonchi or rales.   Abdominal:      General: Abdomen is flat. Bowel sounds are normal. There is no distension.      Palpations: Abdomen is soft. There is no hepatomegaly, splenomegaly or mass.      Tenderness: There is no abdominal tenderness. There is no right CVA tenderness, left CVA tenderness, guarding or rebound. Negative signs include Haley's sign and McBurney's sign.      Hernia: No hernia is present.       Musculoskeletal:         " General: No swelling, tenderness, deformity or signs of injury.      Cervical back: Normal range of motion and neck supple.      Right lower leg: No edema.      Left lower leg: No edema.   Skin:     General: Skin is warm and dry.      Capillary Refill: Capillary refill takes less than 2 seconds.   Neurological:      General: No focal deficit present.      Mental Status: She is alert and oriented to person, place, and time.      Gait: Gait normal.   Psychiatric:         Attention and Perception: Attention and perception normal.         Mood and Affect: Mood and affect normal.         Speech: Speech normal.         Behavior: Behavior normal. Behavior is cooperative.          Assessment/Plan :   1. Lower abdominal pain  -     Urinalysis, Reflex to Urine Culture Urine, Clean Catch; Future; Expected date: 12/13/2024  -     X-Ray Abdomen Flat And Erect; Future; Expected date: 12/13/2024    2. Tinea corporis  -     ketoconazole (NIZORAL) 2 % cream; Apply topically once daily.  Dispense: 30 g; Refill: 0    3. Nocturnal enuresis  -     Urinalysis, Reflex to Urine Culture Urine, Clean Catch; Future; Expected date: 12/13/2024    4. Urge incontinence of urine  -     Urinalysis, Reflex to Urine Culture Urine, Clean Catch; Future; Expected date: 12/13/2024    5. Chronic midline low back pain without sciatica  -     Connected Back Care Companion         Assessment & Plan    Considered differential diagnosis for abdominal pain including constipation, urinary tract infection, and ovarian cyst  Evaluated patient's urinary incontinence symptoms and determined need for bladder training exercises  Assessed lipoma on arm, previously diagnosed by Dr. Mendoza    URINARY INCONTINENCE:  - Explained bladder training exercises and their importance for managing urinary incontinence.  - Discussed Kegel exercises and proper technique.  - Shynia to perform bladder training exercises as instructed.  - Shynia to practice Kegel exercises throughout the  day.  - Shynia to read provided information on bladder training.  - Urinalysis ordered.  - Referred to urologist for further evaluation of urinary incontinence.  - Contact the office if bladder training exercises are not effective after 1 week.    LIPOMA ON ARM:  - Started ketoconazole cream for lipoma on arm.    ABDOMINAL PAIN:  - Abdominal x-ray ordered.    FOLLOW-UP:  - Follow up after completing physical therapy to reassess neck and back pain.          Follow up if symptoms worsen or fail to improve.    This note was generated with the assistance of ambient listening technology. Verbal consent was obtained by the patient and accompanying visitor(s) for the recording of patient appointment to facilitate this note. I attest to having reviewed and edited the generated note for accuracy, though some syntax or spelling errors may persist. Please contact the author of this note for any clarification.

## 2024-12-16 ENCOUNTER — CLINICAL SUPPORT (OUTPATIENT)
Dept: REHABILITATION | Facility: HOSPITAL | Age: 19
End: 2024-12-16
Payer: COMMERCIAL

## 2024-12-16 ENCOUNTER — PATIENT MESSAGE (OUTPATIENT)
Dept: ADMINISTRATIVE | Facility: OTHER | Age: 19
End: 2024-12-16
Payer: COMMERCIAL

## 2024-12-16 ENCOUNTER — PATIENT OUTREACH (OUTPATIENT)
Dept: OTHER | Facility: OTHER | Age: 19
End: 2024-12-16
Payer: COMMERCIAL

## 2024-12-16 DIAGNOSIS — G89.29 CHRONIC MIDLINE LOW BACK PAIN WITHOUT SCIATICA: Primary | ICD-10-CM

## 2024-12-16 DIAGNOSIS — M54.2 NECK PAIN: ICD-10-CM

## 2024-12-16 DIAGNOSIS — M54.50 CHRONIC MIDLINE LOW BACK PAIN WITHOUT SCIATICA: Primary | ICD-10-CM

## 2024-12-16 PROCEDURE — 97750 PHYSICAL PERFORMANCE TEST: CPT | Mod: 32,PN

## 2024-12-16 NOTE — PROGRESS NOTES
[facility]  Physical Therapy Treatment Note     Name: Natalia Sue  Clinic Number: [id number]    Therapy Diagnosis:   Encounter Diagnoses   Name Primary?    Back pain, unspecified back location, unspecified back pain laterality, unspecified chronicity     Chronic myofascial pain      Physician: Seven Marina MD    Visit Date: 12/13/2024    Physician Orders: Evaluate and treat - Healthy back  Medical Diagnosis: Myofascial pain  Evaluation Date: 11/15/2024  Authorization Period Expiration: 12/31/2024  Reassessment Due: 1/14/2025  Plan of Care Certification Period: 11/15/2024 to 1/14/2025  Visit #/Visits authorized: 9/20   MedX Testing:MedX testing visit 2    PTA Visit #: 0/5     Time In: 1430  Time Out: 1505  Total Billable Time:35 minutes  INSURANCE and OUTCOMES: Program Benefit Group with Cervical Outcomes (NDI and AQoL) 1/3    Precautions: standard    Pattern of pain determined:     Subjective     Natalia Sue some benefit from dry needling.    Patient reports tolerating previous visit well  Patient reports their pain to be 4/10 on a 0-10 scale with 0 being no pain and 10 being the worst pain imaginable.  Pain Location: Neck and low back     Work and leisure: She works everyday  Pt goals: to decrease pain    Objective      Cervical  Isometric Testing on Med X equipment: Testing administered by PT    Test Initial Baseline Midpoint Final   Date 11/19/2024     ROM  72 deg     Max Peak Torque  110     Min Peak Torque  50     Flex/Ext Ratio      % variance  normative data -61%     % change from initial test N/A visit 1     Lumbar  Isometric Testing on Med X equipment: Testing administered by PT    Test Initial Baseline Midpoint Final   Date 11/19/2024     ROM  42 deg     Max Peak Torque  77     Min Peak Torque  35     Flex/Ext Ratio      % variance  normative data -63%     % change from initial test N/A visit 1         Outcomes:  Intake Score: 60%  Visit 6 Score:    Visit 10 Score:   Discharge Score:  Goal Score: 69%     Treatment     Shynia received the treatments listed below:        CPT Intervention  Neck and low back pain Duration / Intensity      X TE Recumbent bike  5 minutes   X  NMR MedX lumbar extension  HB   X  NMR MedX cervical extension  HB      NMR MedX trunk rotation  20#x20   X NMR Cable rows  30# 2x10   X NMR Standing T Red tubing 3x10     TE Bridges 3x20 seconds     TE Hamstring stretch with strap 3x20 seconds     NMR PPT 2x20     TE Piriformis stretch 3x20 seconds     NMR Supine cervical chin tuck        NMR Open book       NMR Prone cervical retraction       MAN Dry Needling                                                                   PLAN             CPT Codes available for Billing:   (00) minutes of Manual therapy (MT) to improve pain and ROM.  (00) minutes of Therapeutic Exercise (TE) to develop strength, endurance, range of motion, and flexibility.  (35) minutes of Neuromuscular Re-Education (NMR)  to improve: Balance, Coordination, Kinesthetic, Sense, Proprioception, and Posture.  (00) minutes of Therapeutic Activities (TA) to improve functional performance.  Unattended Electrical Stimulation (ES) for muscle performance or pain modulation.  Palpation Assessment to determine the necessity for Functional Dry Needling - Yes   Patient provided written and verbal consent to Functional Dry Needling- Yes  Natalia received the following manual therapy techniques: Palpation of trigger points and bony landmarks,  STM     Written Handout on response to FDN provided: Yes   Natalia demonstrated good  understanding of the education provided.      Patient demonstrated appropriate response to Functional Dry Needling. good rhythmical contractions observed with estim to treated muscle groups.   Lumbar paraspinal muscles and upper traps     Patient Education and Home Exercises     Home exercises include:  Bridges  Clamshells  Dynamic Hamstring stretches  Cardio  program (V5): -  Lifting education (V11): -  Posture/Lumbar roll:   Fridge Magnet Discharge handout (date given): -  Equipment at home/gym membership: Yes    Education provided:   - PT role and POC  - HEP    Written Home Exercises Provided: Patient instructed to cont prior HEP.  Exercises were reviewed and Natalia was able to demonstrate them prior to the end of the session.  Natalia demonstrated good  understanding of the education provided.     See EMR under Patient Instructions for exercises provided prior visit.    Assessment   The patient is progressing with core exercises and strengthening  of postural and cervical regions. She had benefit during exercises from lumbar dry needling.  She will benefit from progressive PT    Patient is making good progress towards established goals.  Pt will continue to benefit from skilled outpatient physical therapy to address the deficits stated in the impairment chart, provide pt/family education and to maximize pt's level of independence in the home and community environment.     Anticipated Barriers for therapy: None  Pt's spiritual, cultural and educational needs considered and pt agreeable to plan of care and goals as stated below:     Goals:   Short term goals:  6 weeks or 10 visits   - Pt will demonstrate increased lumbar MedX ROM by at least 5 degrees from the initial ROM value with improvements noted in functional ROM and ability to perform ADLs. Appropriate and Ongoing  - Pt will demonstrate increased MedX average isometric strength value by 5% from initial test resulting in improved ability to perform bending, lifting, and carrying activities safely, confidently. Appropriate and Ongoing  - Pt will report a reduction in worst pain score by 1-2 points for improved tolerance for daily work related activity. Appropriate and Ongoing  - Pt able to perform HEP correctly with minimal cueing or supervision from therapist to encourage independent management of symptoms.  Appropriate and Ongoing     Long term goals: 10 weeks or 20 visits   - Pt will demonstrate increased lumbar MedX ROM by at least 10 degrees from initial ROM value, resulting in improved ability to perform functional forward bending while standing and sitting. Appropriate and Ongoing  - Pt will demonstrate increased MedX average isometric strength value by 10% from initial test resulting in improved ability to perform bending, lifting, and carrying activities safely and confidently. Appropriate and OngAoing  - Pt to demonstrate ability to independently control and reduce their pain through posture positioning and mechanical movements throughout a typical day. Appropriate and Ongoing  - Pt will demonstrate independence with the HEP at discharge. Appropriate and Ongoing    Plan     Continue with established Plan of Care towards established PT goals.     Therapist: Jesus Iraheta, PT  12/15/2024

## 2024-12-16 NOTE — PROGRESS NOTES
[facility]ER[facility] AND WELL[facility] - [facility]  Physical Therapy Treatment Note     Name: Natalia Sue  Clinic Number: [id number]    Therapy Diagnosis:   Encounter Diagnoses   Name Primary?    Chronic midline low back pain without sciatica Yes    Neck pain      Physician: Seven Marina MD    Visit Date: 12/16/2024    Physician Orders: Evaluate and treat - Healthy back  Medical Diagnosis: Myofascial pain  Evaluation Date: 11/15/2024  Authorization Period Expiration: 12/31/2024  Reassessment Due: 1/14/2025  Plan of Care Certification Period: 11/15/2024 to 1/14/2025  Visit #/Visits authorized: 9/20   MedX Testing:MedX testing visit 2    PTA Visit #: 0/5     Time In: 0635  Time Out: 0730  Total Billable Time: 55 minutes  INSURANCE and OUTCOMES: Program Benefit Group with Cervical Outcomes (NDI and AQoL) 2/3    Precautions: standard    Pattern of pain determined:     Subjective     Natalia Sue  has decreased pain    Patient reports tolerating previous visit well  Patient reports their pain to be 0/10 on a 0-10 scale with 0 being no pain and 10 being the worst pain imaginable.  Pain Location: Neck and low back     Work and leisure: She works everyday  Pt goals: to decrease pain    Objective      Cervical  Isometric Testing on Med X equipment: Testing administered by PT    Test Initial Baseline Midpoint Final   Date 11/19/2024     ROM  72 deg     Max Peak Torque  110     Min Peak Torque  50     Flex/Ext Ratio      % variance  normative data -61%     % change from initial test N/A visit 1     Lumbar  Isometric Testing on Med X equipment: Testing administered by PT    Test Initial Baseline Midpoint Final   Date 11/19/2024 12/16/2024    ROM  42 deg 52    Max Peak Torque  77 91    Min Peak Torque  35 49    Flex/Ext Ratio      % variance  normative data -63% -46    % change from initial test N/A visit 1         Outcomes:  Intake Score: 60%  Visit 6 Score:   Visit 10 Score: 69%  Discharge Score:  Goal Score:  69%     Treatment     Natalia received the treatments listed below:        CPT Intervention  Neck and low back pain Duration / Intensity      X TE Recumbent bike  5 minutes   X  NMR MedX lumbar extension  Testing     NMR MedX cervical extension  Not performed   X  NMR MedX trunk rotation  20#x20   X NMR Cable rows  30# 2x10   X NMR Standing T Red tubing 3x10    X TE Bridges 3x20 seconds    X TE Hamstring stretch with strap 3x20 seconds    X NMR PPT 2x20    X TE Piriformis stretch 3x20 seconds    X NMR Supine cervical chin tuck       X NMR Open book      X NMR Prone cervical retraction      MAN Dry Needling                                                                   PLAN             CPT Codes available for Billing:   (00) minutes of Manual therapy (MT) to improve pain and ROM.  (20) minutes of Therapeutic Exercise (TE) to develop strength, endurance, range of motion, and flexibility.  (35) minutes of Neuromuscular Re-Education (NMR)  to improve: Balance, Coordination, Kinesthetic, Sense, Proprioception, and Posture.  (00) minutes of Therapeutic Activities (TA) to improve functional performance.       Patient Education and Home Exercises     Home exercises include:  Bridges  Clamshells  Dynamic Hamstring stretches  Cardio program (V5): -  Lifting education (V11): -  Posture/Lumbar roll:   Fridge Magnet Discharge handout (date given): -  Equipment at home/gym membership: Yes    Education provided:   - PT role and POC  - HEP    Written Home Exercises Provided: Patient instructed to cont prior HEP.  Exercises were reviewed and Natalia was able to demonstrate them prior to the end of the session.  Natalia demonstrated good  understanding of the education provided.     See EMR under Patient Instructions for exercises provided prior visit.    Assessment   The patient is progressing with core exercises and strengthening  of postural and cervical regions. She has reduced pain and demonstrates increased strength and motion  without pain.  She will benefit from progressive PT    Patient is making good progress towards established goals.  Pt will continue to benefit from skilled outpatient physical therapy to address the deficits stated in the impairment chart, provide pt/family education and to maximize pt's level of independence in the home and community environment.     Anticipated Barriers for therapy: None  Pt's spiritual, cultural and educational needs considered and pt agreeable to plan of care and goals as stated below:     Goals:   Short term goals:  6 weeks or 10 visits   - Pt will demonstrate increased lumbar MedX ROM by at least 5 degrees from the initial ROM value with improvements noted in functional ROM and ability to perform ADLs. Appropriate and Ongoing  - Pt will demonstrate increased MedX average isometric strength value by 5% from initial test resulting in improved ability to perform bending, lifting, and carrying activities safely, confidently. Appropriate and Ongoing  - Pt will report a reduction in worst pain score by 1-2 points for improved tolerance for daily work related activity. Appropriate and Ongoing  - Pt able to perform HEP correctly with minimal cueing or supervision from therapist to encourage independent management of symptoms. Appropriate and Ongoing     Long term goals: 10 weeks or 20 visits   - Pt will demonstrate increased lumbar MedX ROM by at least 10 degrees from initial ROM value, resulting in improved ability to perform functional forward bending while standing and sitting. Appropriate and Ongoing  - Pt will demonstrate increased MedX average isometric strength value by 10% from initial test resulting in improved ability to perform bending, lifting, and carrying activities safely and confidently. Appropriate and OngAoing  - Pt to demonstrate ability to independently control and reduce their pain through posture positioning and mechanical movements throughout a typical day. Appropriate and  Ongoing  - Pt will demonstrate independence with the HEP at discharge. Appropriate and Ongoing    Plan     Continue with established Plan of Care towards established PT goals.     Therapist: Jesus Iraheta, PT  12/16/2024

## 2024-12-17 ENCOUNTER — PATIENT MESSAGE (OUTPATIENT)
Dept: INTERNAL MEDICINE | Facility: CLINIC | Age: 19
End: 2024-12-17
Payer: COMMERCIAL

## 2024-12-18 ENCOUNTER — OFFICE VISIT (OUTPATIENT)
Dept: INTERNAL MEDICINE | Facility: CLINIC | Age: 19
End: 2024-12-18
Payer: COMMERCIAL

## 2024-12-18 VITALS
BODY MASS INDEX: 30.49 KG/M2 | WEIGHT: 141.31 LBS | SYSTOLIC BLOOD PRESSURE: 118 MMHG | TEMPERATURE: 98 F | OXYGEN SATURATION: 99 % | HEART RATE: 107 BPM | DIASTOLIC BLOOD PRESSURE: 76 MMHG | HEIGHT: 57 IN

## 2024-12-18 DIAGNOSIS — R73.03 PREDIABETES: Primary | ICD-10-CM

## 2024-12-18 DIAGNOSIS — K59.00 CONSTIPATION, UNSPECIFIED CONSTIPATION TYPE: ICD-10-CM

## 2024-12-18 LAB
ESTIMATED AVG GLUCOSE: 120 MG/DL (ref 68–131)
HBA1C MFR BLD: 5.8 % (ref 4–5.6)

## 2024-12-18 PROCEDURE — 99999 PR PBB SHADOW E&M-EST. PATIENT-LVL III: CPT | Mod: PBBFAC,,, | Performed by: NURSE PRACTITIONER

## 2024-12-18 PROCEDURE — 1160F RVW MEDS BY RX/DR IN RCRD: CPT | Mod: CPTII,S$GLB,, | Performed by: NURSE PRACTITIONER

## 2024-12-18 PROCEDURE — 3008F BODY MASS INDEX DOCD: CPT | Mod: CPTII,S$GLB,, | Performed by: NURSE PRACTITIONER

## 2024-12-18 PROCEDURE — G2211 COMPLEX E/M VISIT ADD ON: HCPCS | Mod: S$GLB,,, | Performed by: NURSE PRACTITIONER

## 2024-12-18 PROCEDURE — 1159F MED LIST DOCD IN RCRD: CPT | Mod: CPTII,S$GLB,, | Performed by: NURSE PRACTITIONER

## 2024-12-18 PROCEDURE — 3044F HG A1C LEVEL LT 7.0%: CPT | Mod: CPTII,S$GLB,, | Performed by: NURSE PRACTITIONER

## 2024-12-18 PROCEDURE — 99214 OFFICE O/P EST MOD 30 MIN: CPT | Mod: S$GLB,,, | Performed by: NURSE PRACTITIONER

## 2024-12-18 PROCEDURE — 3078F DIAST BP <80 MM HG: CPT | Mod: CPTII,S$GLB,, | Performed by: NURSE PRACTITIONER

## 2024-12-18 PROCEDURE — 3074F SYST BP LT 130 MM HG: CPT | Mod: CPTII,S$GLB,, | Performed by: NURSE PRACTITIONER

## 2024-12-18 PROCEDURE — 83036 HEMOGLOBIN GLYCOSYLATED A1C: CPT | Performed by: NURSE PRACTITIONER

## 2024-12-20 ENCOUNTER — PATIENT OUTREACH (OUTPATIENT)
Dept: OTHER | Facility: OTHER | Age: 19
End: 2024-12-20
Payer: COMMERCIAL

## 2024-12-20 ENCOUNTER — CLINICAL SUPPORT (OUTPATIENT)
Dept: REHABILITATION | Facility: HOSPITAL | Age: 19
End: 2024-12-20
Payer: COMMERCIAL

## 2024-12-20 DIAGNOSIS — G89.29 CHRONIC MIDLINE LOW BACK PAIN WITHOUT SCIATICA: Primary | ICD-10-CM

## 2024-12-20 DIAGNOSIS — M54.50 CHRONIC MIDLINE LOW BACK PAIN WITHOUT SCIATICA: Primary | ICD-10-CM

## 2024-12-20 DIAGNOSIS — M54.2 NECK PAIN: ICD-10-CM

## 2024-12-20 PROCEDURE — 97140 MANUAL THERAPY 1/> REGIONS: CPT | Mod: PN

## 2024-12-20 PROCEDURE — 97110 THERAPEUTIC EXERCISES: CPT | Mod: PN

## 2024-12-20 PROCEDURE — 97112 NEUROMUSCULAR REEDUCATION: CPT | Mod: PN

## 2024-12-22 NOTE — PROGRESS NOTES
[facility]PAT[facility] THERAP[facility] AND [facility] - HE[facility]Y [facility]  Physical Therapy Treatment Note     Name: Natalia Sue  Clinic Number: [id number]    Therapy Diagnosis:   Encounter Diagnoses   Name Primary?    Chronic midline low back pain without sciatica Yes    Neck pain      Physician: Seven Marina MD    Visit Date: 12/20/2024    Physician Orders: Evaluate and treat - Healthy back  Medical Diagnosis: Myofascial pain  Evaluation Date: 11/15/2024  Authorization Period Expiration: 12/31/2024  Reassessment Due: 1/14/2025  Plan of Care Certification Period: 11/15/2024 to 1/14/2025  Visit #/Visits authorized: 9/20   MedX Testing:MedX testing visit 2    PTA Visit #: 0/5     Time In: 1215  Time Out: 1315  Total Billable Time: 60 minutes  INSURANCE and OUTCOMES: Program Benefit Group with Cervical Outcomes (NDI and AQoL) 2/3    Precautions: standard    Pattern of pain determined:     Subjective     Natalia Sue  she is tight in her back    Patient reports tolerating previous visit well  Patient reports their pain to be 0/10 on a 0-10 scale with 0 being no pain and 10 being the worst pain imaginable.  Pain Location: Neck and low back     Work and leisure: She works everyday  Pt goals: to decrease pain    Objective      Cervical  Isometric Testing on Med X equipment: Testing administered by PT    Test Initial Baseline Midpoint Final   Date 11/19/2024     ROM  72 deg     Max Peak Torque  110     Min Peak Torque  50     Flex/Ext Ratio      % variance  normative data -61%     % change from initial test N/A visit 1     Lumbar  Isometric Testing on Med X equipment: Testing administered by PT    Test Initial Baseline Midpoint Final   Date 11/19/2024 12/16/2024    ROM  42 deg 52    Max Peak Torque  77 91    Min Peak Torque  35 49    Flex/Ext Ratio      % variance  normative data -63% -46    % change from initial test N/A visit 1         Outcomes:  Intake Score: 60%  Visit 6 Score:   Visit 10 Score: 69%  Discharge Score:  Goal  Score: 69%     Treatment     Natalia received the treatments listed below:        CPT Intervention  Neck and low back pain Duration / Intensity      X TE Recumbent bike  5 minutes   X  NMR MedX lumbar extension  Testing     NMR MedX cervical extension  Not performed   X  NMR MedX trunk rotation  20#x20   X NMR Cable rows  30# 2x10   X NMR Standing T Red tubing 3x10    X TE Bridges 3x20 seconds    X TE Hamstring stretch with strap 3x20 seconds    X NMR PPT 2x20    X TE Piriformis stretch 3x20 seconds    X NMR Supine cervical chin tuck       X NMR Open book      X NMR Prone cervical retraction      MT Dry Needling      x MT  Thoracic AP springing and STM                                                         PLAN             CPT Codes available for Billing:   (10) minutes of Manual therapy (MT) to improve pain and ROM.  (20) minutes of Therapeutic Exercise (TE) to develop strength, endurance, range of motion, and flexibility.  (30) minutes of Neuromuscular Re-Education (NMR)  to improve: Balance, Coordination, Kinesthetic, Sense, Proprioception, and Posture.  (00) minutes of Therapeutic Activities (TA) to improve functional performance.       Patient Education and Home Exercises     Home exercises include:  Bridges  Clamshells  Dynamic Hamstring stretches  Cardio program (V5): -  Lifting education (V11): -  Posture/Lumbar roll:   Fridge Magnet Discharge handout (date given): -  Equipment at home/gym membership: Yes    Education provided:   - PT role and POC  - HEP    Written Home Exercises Provided: Patient instructed to cont prior HEP.  Exercises were reviewed and Natalia was able to demonstrate them prior to the end of the session.  Natalia demonstrated good  understanding of the education provided.     See EMR under Patient Instructions for exercises provided prior visit.    Assessment   The patient is progressing with core exercises and strengthening  of postural and cervical regions. She has reduced pain and  demonstrates increased strength and motion without pain.  She will benefit from progressive PT    Patient is making good progress towards established goals.  Pt will continue to benefit from skilled outpatient physical therapy to address the deficits stated in the impairment chart, provide pt/family education and to maximize pt's level of independence in the home and community environment.     Anticipated Barriers for therapy: None  Pt's spiritual, cultural and educational needs considered and pt agreeable to plan of care and goals as stated below:     Goals:   Short term goals:  6 weeks or 10 visits   - Pt will demonstrate increased lumbar MedX ROM by at least 5 degrees from the initial ROM value with improvements noted in functional ROM and ability to perform ADLs. Appropriate and Ongoing  - Pt will demonstrate increased MedX average isometric strength value by 5% from initial test resulting in improved ability to perform bending, lifting, and carrying activities safely, confidently. Appropriate and Ongoing  - Pt will report a reduction in worst pain score by 1-2 points for improved tolerance for daily work related activity. Appropriate and Ongoing  - Pt able to perform HEP correctly with minimal cueing or supervision from therapist to encourage independent management of symptoms. Appropriate and Ongoing     Long term goals: 10 weeks or 20 visits   - Pt will demonstrate increased lumbar MedX ROM by at least 10 degrees from initial ROM value, resulting in improved ability to perform functional forward bending while standing and sitting. Appropriate and Ongoing  - Pt will demonstrate increased MedX average isometric strength value by 10% from initial test resulting in improved ability to perform bending, lifting, and carrying activities safely and confidently. Appropriate and OngAoing  - Pt to demonstrate ability to independently control and reduce their pain through posture positioning and mechanical movements  throughout a typical day. Appropriate and Ongoing  - Pt will demonstrate independence with the HEP at discharge. Appropriate and Ongoing    Plan     Continue with established Plan of Care towards established PT goals.     Therapist: Jesus Iraheta, PT  12/22/2024

## 2024-12-23 ENCOUNTER — CLINICAL SUPPORT (OUTPATIENT)
Dept: REHABILITATION | Facility: HOSPITAL | Age: 19
End: 2024-12-23
Payer: COMMERCIAL

## 2024-12-23 DIAGNOSIS — M54.2 NECK PAIN: ICD-10-CM

## 2024-12-23 DIAGNOSIS — G89.29 CHRONIC MIDLINE LOW BACK PAIN WITHOUT SCIATICA: Primary | ICD-10-CM

## 2024-12-23 DIAGNOSIS — M54.50 CHRONIC MIDLINE LOW BACK PAIN WITHOUT SCIATICA: Primary | ICD-10-CM

## 2024-12-23 PROCEDURE — 97750 PHYSICAL PERFORMANCE TEST: CPT | Mod: 32,PN

## 2024-12-23 NOTE — PROGRESS NOTES
[facility]  Physical Therapy Treatment Note     Name: Natalia Sue  Clinic Number: [id number]    Therapy Diagnosis:   Encounter Diagnoses   Name Primary?    Chronic midline low back pain without sciatica Yes    Neck pain      Physician: Seven Marina MD    Visit Date: 12/23/2024    Physician Orders: Evaluate and treat - Healthy back  Medical Diagnosis: Myofascial pain  Evaluation Date: 11/15/2024  Authorization Period Expiration: 12/31/2024  Reassessment Due: 1/14/2025  Plan of Care Certification Period: 11/15/2024 to 1/14/2025  Visit #/Visits authorized: 9/20   MedX Testing:MedX testing visit 2    PTA Visit #: 0/5     Time In: 0700  Time Out: 0757  Total Billable Time: 57 minutes  INSURANCE and OUTCOMES: Program Benefit Group with Cervical Outcomes (NDI and AQoL) 2/3    Precautions: standard    Pattern of pain determined:     Subjective     Natalia Sue  she is tight in her back    Patient reports tolerating previous visit well  Patient reports their pain to be 0/10 on a 0-10 scale with 0 being no pain and 10 being the worst pain imaginable.  Pain Location: Neck and low back     Work and leisure: She works everyday  Pt goals: to decrease pain    Objective      Cervical  Isometric Testing on Med X equipment: Testing administered by PT     Test Initial Baseline Midpoint Final   Date 11/19/2024 12/20/2024     ROM  72 deg 90 deg     Max Peak Torque  110 139     Min Peak Torque  50 128     Flex/Ext Ratio         % variance normative data -61%  -23%     % change from initial test N/A visit 1  38%30     Lumbar  Isometric Testing on Med X equipment: Testing administered by PT    Test Initial Baseline Midpoint Final   Date 11/19/2024 12/16/2024    ROM  42 deg 52    Max Peak Torque  77 91    Min Peak Torque  35 49    Flex/Ext Ratio      % variance  normative data -63% -46%    % change from initial test N/A visit 1 17%        Outcomes:  Intake Score: 60%  Visit 6 Score:    Visit 10 Score: 69%  Discharge Score:  Goal Score: 69%     Treatment     Natalia received the treatments listed below:        CPT Intervention  Neck and low back pain Duration / Intensity      X TE Recumbent bike  5 minutes   X  NMR MedX lumbar extension  Testing     NMR MedX cervical extension  Not performed   X  NMR MedX trunk rotation  20#x20   X NMR Cable rows  30# 2x10   X NMR Standing T Red tubing 3x10    X TE Bridges 3x20 seconds    X TE Hamstring stretch with strap 3x20 seconds    X NMR PPT 2x20    X TE Piriformis stretch 3x20 seconds    X NMR Supine cervical chin tuck       X NMR Open book      X NMR Prone cervical retraction      MT Dry Needling      x MT  Thoracic AP springing and STM                                                         PLAN             CPT Codes available for Billing:   (10) minutes of Manual therapy (MT) to improve pain and ROM.  (20) minutes of Therapeutic Exercise (TE) to develop strength, endurance, range of motion, and flexibility.  (30) minutes of Neuromuscular Re-Education (NMR)  to improve: Balance, Coordination, Kinesthetic, Sense, Proprioception, and Posture.  (00) minutes of Therapeutic Activities (TA) to improve functional performance.       Patient Education and Home Exercises     Home exercises include:  Jose Alfredo Christianson  Dynamic Hamstring stretches  Cardio program (V5): -  Lifting education (V11): -  Posture/Lumbar roll:   Fridge Magnet Discharge handout (date given): -  Equipment at home/gym membership: Yes    Education provided:   - PT role and POC  - HEP    Written Home Exercises Provided: Patient instructed to cont prior HEP.  Exercises were reviewed and Natalia was able to demonstrate them prior to the end of the session.  Natalia demonstrated good  understanding of the education provided.     See EMR under Patient Instructions for exercises provided prior visit.    Assessment   The patient is progressing with core exercises and strengthening  of postural and  cervical regions. She has reduced pain and demonstrates increased strength and motion without pain.  She will benefit from progressive PT    Patient is making good progress towards established goals.  Pt will continue to benefit from skilled outpatient physical therapy to address the deficits stated in the impairment chart, provide pt/family education and to maximize pt's level of independence in the home and community environment.     Anticipated Barriers for therapy: None  Pt's spiritual, cultural and educational needs considered and pt agreeable to plan of care and goals as stated below:     Goals:   Short term goals:  6 weeks or 10 visits   - Pt will demonstrate increased lumbar MedX ROM by at least 5 degrees from the initial ROM value with improvements noted in functional ROM and ability to perform ADLs. Appropriate and Ongoing  - Pt will demonstrate increased MedX average isometric strength value by 5% from initial test resulting in improved ability to perform bending, lifting, and carrying activities safely, confidently. Appropriate and Ongoing  - Pt will report a reduction in worst pain score by 1-2 points for improved tolerance for daily work related activity. Appropriate and Ongoing  - Pt able to perform HEP correctly with minimal cueing or supervision from therapist to encourage independent management of symptoms. Appropriate and Ongoing     Long term goals: 10 weeks or 20 visits   - Pt will demonstrate increased lumbar MedX ROM by at least 10 degrees from initial ROM value, resulting in improved ability to perform functional forward bending while standing and sitting. Appropriate and Ongoing  - Pt will demonstrate increased MedX average isometric strength value by 10% from initial test resulting in improved ability to perform bending, lifting, and carrying activities safely and confidently. Appropriate and OngAoing  - Pt to demonstrate ability to independently control and reduce their pain through  posture positioning and mechanical movements throughout a typical day. Appropriate and Ongoing  - Pt will demonstrate independence with the HEP at discharge. Appropriate and Ongoing    Plan     Continue with established Plan of Care towards established PT goals.     Therapist: Jesus Iraheta, PT  12/23/2024

## 2024-12-23 NOTE — PROGRESS NOTES
Subjective:      Patient ID: Natalia Sue is a 19 y.o. female.    Chief Complaint: Follow-up    History of Present Illness    CHIEF COMPLAINT:  Natalia presents today for follow-up regarding constipation.    LIFESTYLE AND DIET:  She exercises infrequently despite having a gym membership and is attempting to increase water intake.    LABS:  Red blood cell count should be 15.      ROS:  General: -fever, -chills, -fatigue, -weight gain, -weight loss  Eyes: -vision changes, -redness, -discharge  ENT: -ear pain, -nasal congestion, -sore throat  Cardiovascular: -chest pain, -palpitations, -lower extremity edema  Respiratory: -cough, -shortness of breath  Gastrointestinal: -abdominal pain, -nausea, -vomiting, -diarrhea, +constipation, -blood in stool  Genitourinary: -dysuria, -hematuria, -frequency  Musculoskeletal: -joint pain, -muscle pain  Skin: -rash, -lesion  Neurological: -headache, -dizziness, -numbness, -tingling  Psychiatric: -anxiety, -depression, -sleep difficulty          Patient Active Problem List   Diagnosis    Chronic midline low back pain without sciatica    Neck pain         Current Outpatient Medications:     buPROPion (WELLBUTRIN XL) 300 MG 24 hr tablet, Take 1 tablet (300 mg total) by mouth once daily., Disp: 90 tablet, Rfl: 1    ciclopirox (LOPROX) 0.77 % Susp, Apply topically 2 (two) times daily., Disp: 30 mL, Rfl: 1    ferrous sulfate (FEOSOL) 325 mg (65 mg iron) Tab tablet, Take 325 mg by mouth once daily., Disp: , Rfl:     ketoconazole (NIZORAL) 2 % cream, Apply topically 2 (two) times daily., Disp: 15 g, Rfl: 0    ketoconazole (NIZORAL) 2 % cream, Apply topically once daily., Disp: 15 g, Rfl: 3    ketoconazole (NIZORAL) 2 % shampoo, Apply topically twice a week, Disp: 120 mL, Rfl: 5    norgestimate-ethinyl estradioL (ORTHO TRI-CYCLEN,TRI-SPRINTEC) 0.18/0.215/0.25 mg-35 mcg (28) tablet, Take 1 tablet by mouth once daily., Disp: 28 tablet, Rfl: 11    triamcinolone acetonide 0.1% (KENALOG) 0.1 %  "ointment, Apply topically 2 (two) times daily., Disp: 30 g, Rfl: 3      Objective:   /76 (BP Location: Right arm, Patient Position: Sitting)   Pulse 107   Temp 97.9 °F (36.6 °C) (Tympanic)   Ht 4' 9" (1.448 m)   Wt 64.1 kg (141 lb 5 oz)   LMP 12/16/2024 (Exact Date)   SpO2 99%   BMI 30.58 kg/m²     Physical Exam             Physical Exam  Vitals and nursing note reviewed.   Constitutional:       General: She is awake. She is not in acute distress.     Appearance: Normal appearance. She is well-developed and well-groomed. She is not ill-appearing, toxic-appearing or diaphoretic.   HENT:      Head: Normocephalic and atraumatic.      Right Ear: External ear normal.      Left Ear: External ear normal.   Eyes:      Pupils: Pupils are equal, round, and reactive to light.   Cardiovascular:      Rate and Rhythm: Normal rate and regular rhythm.      Heart sounds: Normal heart sounds. No murmur heard.  Pulmonary:      Effort: Pulmonary effort is normal. No tachypnea, bradypnea, accessory muscle usage, prolonged expiration, respiratory distress or retractions.      Breath sounds: Normal breath sounds. No stridor, decreased air movement or transmitted upper airway sounds. No decreased breath sounds, wheezing, rhonchi or rales.   Abdominal:      General: Abdomen is flat. Bowel sounds are normal.      Palpations: Abdomen is soft.   Musculoskeletal:         General: No swelling, tenderness, deformity or signs of injury.      Cervical back: Normal range of motion and neck supple.      Right lower leg: No edema.      Left lower leg: No edema.   Skin:     General: Skin is warm and dry.      Capillary Refill: Capillary refill takes less than 2 seconds.   Neurological:      General: No focal deficit present.      Mental Status: She is alert and oriented to person, place, and time.      Gait: Gait normal.   Psychiatric:         Attention and Perception: Attention and perception normal.         Mood and Affect: Mood and " affect normal.         Speech: Speech normal.         Behavior: Behavior normal. Behavior is cooperative.         Thought Content: Thought content normal.         Cognition and Memory: Cognition normal.          Assessment/Plan :   1. Prediabetes  -     Cancel: Hemoglobin A1C; Future; Expected date: 03/18/2025  -     Hemoglobin A1C; Future; Expected date: 12/18/2024         Assessment & Plan    Reviewed patient's constipation and previous x-ray findings  Considered lifestyle modifications and OTC interventions for constipation management  Assessed need for labs follow-up, particularly regarding RBC count    CONSTIPATION:  - Explained importance of fiber intake for managing constipation.  - Shynia to increase water intake.  - Shynia to incorporate more fiber-rich foods into diet, such as apples.  - Started Metamucil (sugar-free): Take 1 tablespoon mixed in 8 ounces of juice, followed by 8 ounces of plain water daily for constipation.  - Follow up if Metamucil is not effective in managing constipation.    GENERAL HEALTH AND WELLNESS:  - Discussed benefits of regular exercise for overall health and weight management.  - Shynia to exercise regularly, aiming for at least 2 times per week at the gym.    LABS:  - Blood work ordered to check RBC count.  - Provider will message patient with labs results.          No follow-ups on file.    This note was generated with the assistance of ambient listening technology. Verbal consent was obtained by the patient and accompanying visitor(s) for the recording of patient appointment to facilitate this note. I attest to having reviewed and edited the generated note for accuracy, though some syntax or spelling errors may persist. Please contact the author of this note for any clarification.

## 2024-12-27 ENCOUNTER — PATIENT OUTREACH (OUTPATIENT)
Dept: OTHER | Facility: OTHER | Age: 19
End: 2024-12-27
Payer: COMMERCIAL

## 2024-12-27 ENCOUNTER — CLINICAL SUPPORT (OUTPATIENT)
Dept: REHABILITATION | Facility: HOSPITAL | Age: 19
End: 2024-12-27
Payer: COMMERCIAL

## 2024-12-27 DIAGNOSIS — G89.29 CHRONIC MIDLINE LOW BACK PAIN WITHOUT SCIATICA: Primary | ICD-10-CM

## 2024-12-27 DIAGNOSIS — M54.2 NECK PAIN: ICD-10-CM

## 2024-12-27 DIAGNOSIS — M54.50 CHRONIC MIDLINE LOW BACK PAIN WITHOUT SCIATICA: Primary | ICD-10-CM

## 2024-12-27 PROCEDURE — 97750 PHYSICAL PERFORMANCE TEST: CPT | Mod: 32,PN

## 2024-12-27 NOTE — PROGRESS NOTES
[facility]: Patient Outreach    Welcome Call completed on 12/27.       Patient Outreach from 12/27/2024 in [employer]    12/27/2024    4080       Is there anything you'd like me to know before you get started? No   Does any position or activity typically cause you pain? Standing for long periods of time.   When do you plan to start the program. 12/28/2024

## 2024-12-29 NOTE — PROGRESS NOTES
[facility]  Physical Therapy Treatment Note     Name: Natalia Sue  Clinic Number: [id number]    Therapy Diagnosis:   Encounter Diagnoses   Name Primary?    Chronic midline low back pain without sciatica Yes    Neck pain      Physician: Seven Marina MD    Visit Date: 12/27/2024    Physician Orders: Evaluate and treat - Healthy back  Medical Diagnosis: Myofascial pain  Evaluation Date: 11/15/2024  Authorization Period Expiration: 12/31/2024  Reassessment Due: 1/14/2025  Plan of Care Certification Period: 11/15/2024 to 1/14/2025  Visit #/Visits authorized: 9/20   MedX Testing:MedX testing visit 2    PTA Visit #: 0/5     Time In: 1400  Time Out: 1453  Total Billable Time: 53 minutes  INSURANCE and OUTCOMES: Program Benefit Group with Cervical Outcomes (NDI and AQoL) 2/3    Precautions: standard    Pattern of pain determined:     Subjective     Natalia Sue  has less pain    Patient reports tolerating previous visit well  Patient reports their pain to be 0/10 on a 0-10 scale with 0 being no pain and 10 being the worst pain imaginable.  Pain Location: Neck and low back     Work and leisure: She works everyday  Pt goals: to decrease pain    Objective      Cervical  Isometric Testing on Med X equipment: Testing administered by PT     Test Initial Baseline Midpoint Final   Date 11/19/2024 12/20/2024     ROM  72 deg 90 deg     Max Peak Torque  110 139     Min Peak Torque  50 128     Flex/Ext Ratio         % variance normative data -61%  -23%     % change from initial test N/A visit 1  38%30     Lumbar  Isometric Testing on Med X equipment: Testing administered by PT    Test Initial Baseline Midpoint Final   Date 11/19/2024 12/16/2024    ROM  42 deg 52    Max Peak Torque  77 91    Min Peak Torque  35 49    Flex/Ext Ratio      % variance  normative data -63% -46%    % change from initial test N/A visit 1 17%        Outcomes:  Intake Score: 60%  Visit 6 Score:   Visit 10  Score: 69%  Discharge Score:  Goal Score: 69%     Treatment     Natalia received the treatments listed below:        CPT Intervention  Neck and low back pain Duration / Intensity      X TE Recumbent bike  5 minutes   X  NMR MedX lumbar extension  Testing     NMR MedX cervical extension  Not performed   X  NMR MedX trunk rotation  20#x20   X NMR Cable rows  30# 2x10   X NMR Standing T Red tubing 3x10    X TE Bridges 3x20 seconds    X TE Hamstring stretch with strap 3x20 seconds    X NMR PPT 2x20    X TE Piriformis stretch 3x20 seconds    X NMR Supine cervical chin tuck       X NMR Open book      X NMR Prone cervical retraction      MT Dry Needling      x MT  Thoracic AP springing and STM                                                         PLAN             CPT Codes available for Billing:   (10) minutes of Manual therapy (MT) to improve pain and ROM.  (15) minutes of Therapeutic Exercise (TE) to develop strength, endurance, range of motion, and flexibility.  (30) minutes of Neuromuscular Re-Education (NMR)  to improve: Balance, Coordination, Kinesthetic, Sense, Proprioception, and Posture.  (00) minutes of Therapeutic Activities (TA) to improve functional performance.       Patient Education and Home Exercises     Home exercises include:  Bridges  Meghan  Dynamic Hamstring stretches  Cardio program (V5): -  Lifting education (V11): -  Posture/Lumbar roll:   Fridge Magnet Discharge handout (date given): -  Equipment at home/gym membership: Yes    Education provided:   - PT role and POC  - HEP    Written Home Exercises Provided: Patient instructed to cont prior HEP.  Exercises were reviewed and Natalia was able to demonstrate them prior to the end of the session.  Ricardorachelle demonstrated good  understanding of the education provided.     See EMR under Patient Instructions for exercises provided prior visit.    Assessment   The patient is progressing with core exercises and strengthening  of postural and cervical  regions. She has reduced pain and demonstrates increased strength and motion without pain.  She will benefit from progressive PT    Patient is making good progress towards established goals.  Pt will continue to benefit from skilled outpatient physical therapy to address the deficits stated in the impairment chart, provide pt/family education and to maximize pt's level of independence in the home and community environment.     Anticipated Barriers for therapy: None  Pt's spiritual, cultural and educational needs considered and pt agreeable to plan of care and goals as stated below:     Goals:   Short term goals:  6 weeks or 10 visits   - Pt will demonstrate increased lumbar MedX ROM by at least 5 degrees from the initial ROM value with improvements noted in functional ROM and ability to perform ADLs. Appropriate and Ongoing  - Pt will demonstrate increased MedX average isometric strength value by 5% from initial test resulting in improved ability to perform bending, lifting, and carrying activities safely, confidently. Appropriate and Ongoing  - Pt will report a reduction in worst pain score by 1-2 points for improved tolerance for daily work related activity. Appropriate and Ongoing  - Pt able to perform HEP correctly with minimal cueing or supervision from therapist to encourage independent management of symptoms. Appropriate and Ongoing     Long term goals: 10 weeks or 20 visits   - Pt will demonstrate increased lumbar MedX ROM by at least 10 degrees from initial ROM value, resulting in improved ability to perform functional forward bending while standing and sitting. Appropriate and Ongoing  - Pt will demonstrate increased MedX average isometric strength value by 10% from initial test resulting in improved ability to perform bending, lifting, and carrying activities safely and confidently. Appropriate and OngAoing  - Pt to demonstrate ability to independently control and reduce their pain through posture  positioning and mechanical movements throughout a typical day. Appropriate and Ongoing  - Pt will demonstrate independence with the HEP at discharge. Appropriate and Ongoing    Plan     Continue with established Plan of Care towards established PT goals.     Therapist: Jesus Iraheta, PT  12/29/2024

## 2024-12-30 ENCOUNTER — CLINICAL SUPPORT (OUTPATIENT)
Dept: REHABILITATION | Facility: HOSPITAL | Age: 19
End: 2024-12-30
Payer: COMMERCIAL

## 2024-12-30 DIAGNOSIS — G89.29 CHRONIC MIDLINE LOW BACK PAIN WITHOUT SCIATICA: Primary | ICD-10-CM

## 2024-12-30 DIAGNOSIS — M54.2 NECK PAIN: ICD-10-CM

## 2024-12-30 DIAGNOSIS — M54.50 CHRONIC MIDLINE LOW BACK PAIN WITHOUT SCIATICA: Primary | ICD-10-CM

## 2024-12-30 PROCEDURE — 97140 MANUAL THERAPY 1/> REGIONS: CPT | Mod: PN

## 2024-12-30 PROCEDURE — 97110 THERAPEUTIC EXERCISES: CPT | Mod: PN

## 2024-12-30 PROCEDURE — 97750 PHYSICAL PERFORMANCE TEST: CPT | Mod: 32,PN

## 2024-12-30 PROCEDURE — 97112 NEUROMUSCULAR REEDUCATION: CPT | Mod: PN

## 2024-12-30 NOTE — PROGRESS NOTES
[facility]  Physical Therapy Treatment Note     Name: Natalia Sue  Clinic Number: [id number]    Therapy Diagnosis:   Encounter Diagnoses   Name Primary?    Chronic midline low back pain without sciatica Yes    Neck pain      Physician: Seven Marina MD    Visit Date: 12/30/2024    Physician Orders: Evaluate and treat - Healthy back  Medical Diagnosis: Myofascial pain  Evaluation Date: 11/15/2024  Authorization Period Expiration: 12/31/2024  Reassessment Due: 1/14/2025  Plan of Care Certification Period: 11/15/2024 to 1/14/2025  Visit #/Visits authorized: 9/20   MedX Testing:MedX testing visit 2    PTA Visit #: 0/5     Time In: 0706  Time Out: 0854  Total Billable Time: 48 minutes  INSURANCE and OUTCOMES: Program Benefit Group with Cervical Outcomes (NDI and AQoL) 2/3    Precautions: standard    Pattern of pain determined:     Subjective     Natalia Sue  complaints of tightness across the neck and shoulders.  States she regularly pops her neck and back for relief    Patient reports tolerating previous visit well  Patient reports their pain to be 2/10 on a 0-10 scale with 0 being no pain and 10 being the worst pain imaginable.  Pain Location: Neck and low back     Work and leisure: She works everyday  Pt goals: to decrease pain    Objective      Cervical  Isometric Testing on Med X equipment: Testing administered by PT     Test Initial Baseline Midpoint Final   Date 11/19/2024 12/20/2024     ROM  72 deg 90 deg     Max Peak Torque  110 139     Min Peak Torque  50 128     Flex/Ext Ratio         % variance normative data -61%  -23%     % change from initial test N/A visit 1  38%30     Lumbar  Isometric Testing on Med X equipment: Testing administered by PT    Test Initial Baseline Midpoint Final   Date 11/19/2024 12/16/2024    ROM  42 deg 52    Max Peak Torque  77 91    Min Peak Torque  35 49    Flex/Ext Ratio      % variance  normative data -63% -46%    % change  from initial test N/A visit 1 17%        Outcomes:  Intake Score: 60%  Visit 6 Score:   Visit 10 Score: 69%  Discharge Score:  Goal Score: 69%     Treatment     Natalia received the treatments listed below:        CPT Intervention  Neck and low back pain Duration / Intensity      X TE Recumbent bike  5 minutes   X  NMR MedX lumbar extension Healthy Back     NMR MedX cervical extension Healthy Back   X  NMR MedX trunk rotation  20#x20   X NMR Cable rows  30# 2x10   X NMR Standing T Red tubing 3x10   X TE Bridges 3x20 seconds   X TE Hamstring stretch with strap 3x20 seconds   X NMR PPT 2x20   X TE Piriformis stretch 3x20 seconds   X NMR Supine cervical chin tuck      X NMR Open book      X NMR Prone cervical retraction      MT Dry Needling     X MT  Thoracic AP springing and STM                                                         PLAN             CPT Codes available for Billing:   (8) minutes of Manual therapy (MT) to improve pain and ROM.  (15) minutes of Therapeutic Exercise (TE) to develop strength, endurance, range of motion, and flexibility.  (25) minutes of Neuromuscular Re-Education (NMR)  to improve: Balance, Coordination, Kinesthetic, Sense, Proprioception, and Posture.  (00) minutes of Therapeutic Activities (TA) to improve functional performance.       Patient Education and Home Exercises     Home exercises include:  Bridges  Clamshells  Dynamic Hamstring stretches  Cardio program (V5): -  Lifting education (V11): -  Posture/Lumbar roll:   Fridge Magnet Discharge handout (date given): -  Equipment at home/gym membership: Yes    Education provided:   - PT role and POC  - HEP    Written Home Exercises Provided: Patient instructed to cont prior HEP.  Exercises were reviewed and Natalia was able to demonstrate them prior to the end of the session.  Natalia demonstrated good  understanding of the education provided.     See EMR under Patient Instructions for exercises provided prior visit.    Assessment   The  patient is progressing with core exercises and strengthening  of postural and cervical regions. She is able to perform multilevel self manipulation of the neck bilaterally.  She has stiffness and received STM and manual therapy to the thoracic and cervical spine.    Patient is making good progress towards established goals.  Pt will continue to benefit from skilled outpatient physical therapy to address the deficits stated in the impairment chart, provide pt/family education and to maximize pt's level of independence in the home and community environment.     Anticipated Barriers for therapy: None  Pt's spiritual, cultural and educational needs considered and pt agreeable to plan of care and goals as stated below:     Goals:   Short term goals:  6 weeks or 10 visits   - Pt will demonstrate increased lumbar MedX ROM by at least 5 degrees from the initial ROM value with improvements noted in functional ROM and ability to perform ADLs. Appropriate and Ongoing  - Pt will demonstrate increased MedX average isometric strength value by 5% from initial test resulting in improved ability to perform bending, lifting, and carrying activities safely, confidently. Appropriate and Ongoing  - Pt will report a reduction in worst pain score by 1-2 points for improved tolerance for daily work related activity. Appropriate and Ongoing  - Pt able to perform HEP correctly with minimal cueing or supervision from therapist to encourage independent management of symptoms. Appropriate and Ongoing     Long term goals: 10 weeks or 20 visits   - Pt will demonstrate increased lumbar MedX ROM by at least 10 degrees from initial ROM value, resulting in improved ability to perform functional forward bending while standing and sitting. Appropriate and Ongoing  - Pt will demonstrate increased MedX average isometric strength value by 10% from initial test resulting in improved ability to perform bending, lifting, and carrying activities safely and  confidently. Appropriate and OngAoing  - Pt to demonstrate ability to independently control and reduce their pain through posture positioning and mechanical movements throughout a typical day. Appropriate and Ongoing  - Pt will demonstrate independence with the HEP at discharge. Appropriate and Ongoing    Plan     Continue with established Plan of Care towards established PT goals.     Therapist: Jesus Iraheta, PT  12/30/2024

## 2025-01-03 ENCOUNTER — CLINICAL SUPPORT (OUTPATIENT)
Dept: REHABILITATION | Facility: HOSPITAL | Age: 20
End: 2025-01-03
Payer: COMMERCIAL

## 2025-01-03 DIAGNOSIS — M54.50 CHRONIC MIDLINE LOW BACK PAIN WITHOUT SCIATICA: Primary | ICD-10-CM

## 2025-01-03 DIAGNOSIS — M54.2 NECK PAIN: ICD-10-CM

## 2025-01-03 DIAGNOSIS — G89.29 CHRONIC MIDLINE LOW BACK PAIN WITHOUT SCIATICA: Primary | ICD-10-CM

## 2025-01-03 PROCEDURE — 97750 PHYSICAL PERFORMANCE TEST: CPT | Mod: 32,PN

## 2025-01-04 NOTE — PROGRESS NOTES
[facility] OUTPATIENT THERAPY AND WELLNESS - HEALTHY BACK  Physical Therapy Treatment Note     Name: Natalia Sue  Clinic Number: [id number]    Therapy Diagnosis:   Encounter Diagnoses   Name Primary?    Chronic midline low back pain without sciatica Yes    Neck pain      Physician: Seven Marina MD    Visit Date: 1/3/2025    Physician Orders: Evaluate and treat - Healthy back  Medical Diagnosis: Myofascial pain  Evaluation Date: 11/15/2024  Authorization Period Expiration: 12/31/2024  Reassessment Due: 1/14/2025  Plan of Care Certification Period: 11/15/2024 to 1/14/2025  Visit #/Visits authorized: 9/20   1  /10  MedX Testing:MedX testing visit 2    PTA Visit #: 0/5     Time In: 1330  Time Out: 1425  Total Billable Time: 55 minutes  INSURANCE and OUTCOMES: Program Benefit Group with Cervical Outcomes (NDI and AQoL) 2/3    Precautions: standard    Pattern of pain determined:     Subjective     Natalia Sue  complaints of tightness across the neck and shoulders.  States she regularly pops her neck and back for relief    Patient reports tolerating previous visit well  Patient reports their pain to be 2/10 on a 0-10 scale with 0 being no pain and 10 being the worst pain imaginable.  Pain Location: Neck and low back     Work and leisure: She works everyday  Pt goals: to decrease pain    Objective      Cervical  Isometric Testing on Med X equipment: Testing administered by PT     Test Initial Baseline Midpoint Final   Date 11/19/2024 12/20/2024     ROM  72 deg 90 deg     Max Peak Torque  110 139     Min Peak Torque  50 128     Flex/Ext Ratio         % variance normative data -61%  -23%     % change from initial test N/A visit 1  38%30     Lumbar  Isometric Testing on Med X equipment: Testing administered by PT    Test Initial Baseline Midpoint Final   Date 11/19/2024 12/16/2024    ROM  42 deg 52    Max Peak Torque  77 91    Min Peak Torque  35 49    Flex/Ext Ratio      % variance  normative data -63% -46%    %  change from initial test N/A visit 1 17%        Outcomes:  Intake Score: 60%  Visit 6 Score:   Visit 10 Score: 69%  Discharge Score:  Goal Score: 69%     Treatment     Natalia received the treatments listed below:        CPT Intervention  Neck and low back pain Duration / Intensity      X TE Recumbent bike  5 minutes   X  NMR MedX lumbar extension Healthy Back     NMR MedX cervical extension Healthy Back   X  NMR MedX trunk rotation  20#x20   X NMR Cable rows  30# 2x10   X NMR Standing T Red tubing 3x10   X TE Bridges 3x20 seconds   X TE Hamstring stretch with strap 3x20 seconds   X NMR PPT 2x20   X TE Piriformis stretch 3x20 seconds   X NMR Supine cervical chin tuck      X NMR Open book      X NMR Prone cervical retraction      MT Dry Needling     X MT  Thoracic AP springing and STM                                                         PLAN             CPT Codes available for Billing:   (8) minutes of Manual therapy (MT) to improve pain and ROM.  (15) minutes of Therapeutic Exercise (TE) to develop strength, endurance, range of motion, and flexibility.  (25) minutes of Neuromuscular Re-Education (NMR)  to improve: Balance, Coordination, Kinesthetic, Sense, Proprioception, and Posture.  (00) minutes of Therapeutic Activities (TA) to improve functional performance.       Patient Education and Home Exercises     Home exercises include:  Bridges  Clamshells  Dynamic Hamstring stretches  Cardio program (V5): -  Lifting education (V11): -  Posture/Lumbar roll:   Fridge Magnet Discharge handout (date given): -  Equipment at home/gym membership: Yes    Education provided:   - PT role and POC  - HEP    Written Home Exercises Provided: Patient instructed to cont prior HEP.  Exercises were reviewed and Natalia was able to demonstrate them prior to the end of the session.  Natalia demonstrated good  understanding of the education provided.     See EMR under Patient Instructions for exercises provided prior visit.    Assessment    The patient is progressing with core exercises and strengthening  of postural and cervical regions. She is able to perform multilevel self manipulation of the neck bilaterally.  She has stiffness and received STM and manual therapy to the thoracic and cervical spine.    Patient is making good progress towards established goals.  Pt will continue to benefit from skilled outpatient physical therapy to address the deficits stated in the impairment chart, provide pt/family education and to maximize pt's level of independence in the home and community environment.     Anticipated Barriers for therapy: None  Pt's spiritual, cultural and educational needs considered and pt agreeable to plan of care and goals as stated below:     Goals:   Short term goals:  6 weeks or 10 visits   - Pt will demonstrate increased lumbar MedX ROM by at least 5 degrees from the initial ROM value with improvements noted in functional ROM and ability to perform ADLs. Appropriate and Ongoing  - Pt will demonstrate increased MedX average isometric strength value by 5% from initial test resulting in improved ability to perform bending, lifting, and carrying activities safely, confidently. Appropriate and Ongoing  - Pt will report a reduction in worst pain score by 1-2 points for improved tolerance for daily work related activity. Appropriate and Ongoing  - Pt able to perform HEP correctly with minimal cueing or supervision from therapist to encourage independent management of symptoms. Appropriate and Ongoing     Long term goals: 10 weeks or 20 visits   - Pt will demonstrate increased lumbar MedX ROM by at least 10 degrees from initial ROM value, resulting in improved ability to perform functional forward bending while standing and sitting. Appropriate and Ongoing  - Pt will demonstrate increased MedX average isometric strength value by 10% from initial test resulting in improved ability to perform bending, lifting, and carrying activities safely  and confidently. Appropriate and OngAoing  - Pt to demonstrate ability to independently control and reduce their pain through posture positioning and mechanical movements throughout a typical day. Appropriate and Ongoing  - Pt will demonstrate independence with the HEP at discharge. Appropriate and Ongoing    Plan     Continue with established Plan of Care towards established PT goals.     Therapist: Jesus Iraheta, PT  1/4/2025

## 2025-01-06 ENCOUNTER — CLINICAL SUPPORT (OUTPATIENT)
Dept: REHABILITATION | Facility: HOSPITAL | Age: 20
End: 2025-01-06
Payer: COMMERCIAL

## 2025-01-06 DIAGNOSIS — M54.2 NECK PAIN: ICD-10-CM

## 2025-01-06 DIAGNOSIS — G89.29 CHRONIC MIDLINE LOW BACK PAIN WITHOUT SCIATICA: Primary | ICD-10-CM

## 2025-01-06 DIAGNOSIS — M54.50 CHRONIC MIDLINE LOW BACK PAIN WITHOUT SCIATICA: Primary | ICD-10-CM

## 2025-01-06 PROCEDURE — 97750 PHYSICAL PERFORMANCE TEST: CPT | Mod: 32,PN

## 2025-01-09 ENCOUNTER — OFFICE VISIT (OUTPATIENT)
Dept: PAIN MEDICINE | Facility: CLINIC | Age: 20
End: 2025-01-09
Payer: COMMERCIAL

## 2025-01-09 ENCOUNTER — PATIENT MESSAGE (OUTPATIENT)
Dept: OTHER | Facility: OTHER | Age: 20
End: 2025-01-09
Payer: COMMERCIAL

## 2025-01-09 VITALS
HEART RATE: 87 BPM | DIASTOLIC BLOOD PRESSURE: 80 MMHG | WEIGHT: 141.56 LBS | HEIGHT: 57 IN | SYSTOLIC BLOOD PRESSURE: 118 MMHG | BODY MASS INDEX: 30.54 KG/M2 | RESPIRATION RATE: 17 BRPM

## 2025-01-09 DIAGNOSIS — G89.29 CHRONIC MYOFASCIAL PAIN: Primary | ICD-10-CM

## 2025-01-09 DIAGNOSIS — F43.29 STRESS AND ADJUSTMENT REACTION: ICD-10-CM

## 2025-01-09 DIAGNOSIS — M79.18 CHRONIC MYOFASCIAL PAIN: Primary | ICD-10-CM

## 2025-01-09 PROCEDURE — 99213 OFFICE O/P EST LOW 20 MIN: CPT | Mod: S$GLB,,, | Performed by: PHYSICAL MEDICINE & REHABILITATION

## 2025-01-09 PROCEDURE — 3079F DIAST BP 80-89 MM HG: CPT | Mod: CPTII,S$GLB,, | Performed by: PHYSICAL MEDICINE & REHABILITATION

## 2025-01-09 PROCEDURE — 3074F SYST BP LT 130 MM HG: CPT | Mod: CPTII,S$GLB,, | Performed by: PHYSICAL MEDICINE & REHABILITATION

## 2025-01-09 PROCEDURE — 3008F BODY MASS INDEX DOCD: CPT | Mod: CPTII,S$GLB,, | Performed by: PHYSICAL MEDICINE & REHABILITATION

## 2025-01-09 PROCEDURE — 99999 PR PBB SHADOW E&M-EST. PATIENT-LVL III: CPT | Mod: PBBFAC,,, | Performed by: PHYSICAL MEDICINE & REHABILITATION

## 2025-01-09 RX ORDER — METHOCARBAMOL 500 MG/1
500 TABLET, FILM COATED ORAL 2 TIMES DAILY PRN
Qty: 60 TABLET | Refills: 0 | Status: SHIPPED | OUTPATIENT
Start: 2025-01-09 | End: 2025-02-12

## 2025-01-09 NOTE — PROGRESS NOTES
Established Patient Chronic Pain Note (follow up visit)      Chief Complaint:   Chief Complaint   Patient presents with    Follow-up     3 month follow-up.  Patient has pain in neck and shoulders with tightness in upper to mid back.  Pain level 0/10 but it hurts at night.        SUBJECTIVE:    Natalia Sue is a 19 y.o. female  presents today for follow-up upper back and midback pain.  Current pain intensity is 0/10.   The patient reports that she has been under tremendous amount of stress lately and also has not been taking her mood disorder medication Wellbutrin for the past several months.        Patient denies night fever/night sweats, urinary incontinence, bowel incontinence, significant weight loss, significant motor weakness, and loss of sensations.    Pain Disability Index Review:      11/14/2024     4:11 PM   Last 3 PDI Scores   Pain Disability Index (PDI) 35         Initial HPI 11/14/24:  Natalia Sue is a 19 y.o. female who presents to the clinic for the evaluation of neck and back pain.  She was referred by cardiology for further evaluation and management of this pain.  She has past medical history of DM2.  The pain started a few years ago without any injury or trauma and symptoms have been unchanged.  She was told that she had scoliosis at a young age.  The pain is located in the thoracolumbar area and radiates to the sacral area.  The pain is described as aching and is rated as 5/10. The pain is rated with a score of  2/10 on the BEST day and a score of 9/10 on the WORST day.  Symptoms interfere with daily activity. The pain is exacerbated by standing for prolonged times.  The pain is mitigated by cracking her back . Employment status:  Medical assistant      Non-Pharmacologic Treatments:  Physical Therapy/Home Exercise: no  Ice/Heat:no  TENS: no  Acupuncture: no  Massage: no  Chiropractic: no    Other: no      Pain Medications:  - Opioids:  None recently  - Adjuvant Medications:   Over-the-counter analgesics  - Anti-Coagulants:  None     report:  Reviewed and consistent with medication use as prescribed.    Pain Procedures:   Denies      Imaging:   CT abd/pelvis 10/02/2023:  Liver spleen and pancreas unremarkable     Kidneys without hydronephrosis     Scant ascites.  No obstructive or inflammatory bowel findings     Urinary bladder unremarkable    X-ray spine 06/27/2023:  1.  No acute fracture, subluxation, dislocation, osteoblastic or osteolytic lesion.   2.  No vertebral anomalies. There are 12 paired ribs.   3.  There is a slight dextrocurvature with the apex at T10. The calculated Laws angle is 5.1 degrees measuring from superior endplate of T8 to superior endplate of T12. There is a slight levocurvature in the lumbar spine with the apex at L3-4. The calculated Laws angle is 5.2 degrees measuring from superior endplate of L1 to superior endplate of L5.   4.  No evidence of cardiopulmonary disease.   5.  Moderate stool throughout large bowel. No acute finding otherwise within the abdomen and pelvis.   6.  The surrounding soft tissues otherwise appear intact.       PMHx,PSHx, Social history, and Family history:  I have reviewed the patient's medical, surgical, social, and family history in detail and updated the computerized patient record.        Review of patient's allergies indicates:   Allergen Reactions    Ibuprofen Swelling    Naproxen Swelling     Eye swelling    Nsaids (non-steroidal anti-inflammatory drug) Swelling    Aspirin-acetaminophen-caffeine     Excedrin ib      Eye swelling    Penicillins Hives, Itching, Rash and Swelling       Current Outpatient Medications   Medication Sig    buPROPion (WELLBUTRIN XL) 150 MG TB24 tablet Take 1 tablet (150 mg total) by mouth every morning.    ciclopirox (LOPROX) 0.77 % Susp Apply topically 2 (two) times daily.    ferrous sulfate (FEOSOL) 325 mg (65 mg iron) Tab tablet Take 325 mg by mouth once daily.    hydrocortisone 2.5 % cream Apply  "SPARINGLY to areas of skin irritation twice daily as needed (Use only as needed, max use 5 days.)    ketoconazole (NIZORAL) 2 % cream Apply topically 2 (two) times daily. for 14 days    ketoconazole (NIZORAL) 2 % shampoo Apply to a clean scalp and skin under facial hair twice weekly for 4 weeks.    methocarbamoL (ROBAXIN) 500 MG Tab Take 1 tablet (500 mg total) by mouth 2 (two) times daily as needed (pain).    mirabegron (MYRBETRIQ) 50 mg Tb24 Take 1 tablet (50 mg total) by mouth Daily.    norgestimate-ethinyl estradioL (ORTHO TRI-CYCLEN,TRI-SPRINTEC) 0.18/0.215/0.25 mg-35 mcg (28) tablet Take 1 tablet by mouth once daily.    triamcinolone acetonide 0.1% (KENALOG) 0.1 % ointment Apply topically 2 (two) times daily.     No current facility-administered medications for this visit.       Review of Systems     GENERAL:  No weight loss, malaise or fevers.  HEENT:   No recent changes in vision or hearing  NECK:  Negative for lumps, no difficulty with swallowing.  RESPIRATORY:  Negative for cough, wheezing or shortness of breath, patient denies any recent URI.  CARDIOVASCULAR:  Negative for chest pain, leg swelling or palpitations.  GI:  Negative for abdominal discomfort, blood in stools or black stools or change in bowel habits.  MUSCULOSKELETAL:  See HPI.  SKIN:  Negative for lesions, rash, and itching.  PSYCH:  No mood disorder or recent psychosocial stressors.  Patients sleep is not disturbed secondary to pain.  HEMATOLOGY/LYMPHOLOGY:  Negative for prolonged bleeding, bruising easily or swollen nodes.  Patient is not currently taking any anti-coagulants  NEURO:   No history of headaches, syncope, paralysis, seizures or tremors.  All other reviewed and negative other than HPI.    OBJECTIVE:    /80   Pulse 87   Resp 17   Ht 4' 9" (1.448 m)   Wt 64.2 kg (141 lb 8.6 oz)   LMP 12/16/2024 (Exact Date)   BMI 30.63 kg/m²         Physical Exam    GENERAL: Well appearing, in no acute distress, alert and oriented " x3.  PSYCH:  Mood and affect appropriate.  SKIN: Skin color, texture, turgor normal, no rashes or lesions.  HEAD/FACE:  Normocephalic, atraumatic. Cranial nerves grossly intact.  NECK:  Mild-to-moderate pain to palpation over the cervical paraspinous muscles. Spurling Negative.  Minimal pain with neck flexion, extension, or lateral flexion.   CV: RRR with palpation of the radial artery.  PULM: No evidence of respiratory difficulty, symmetric chest rise.  GI:  Soft and non-tender.  BACK: Straight leg raising in the sitting and supine positions is negative to radicular pain.  Mild-to-moderate pain to palpation over the facet joints of the lumbar spine or spinous processes. Normal range of motion without pain reproduction.  EXTREMITIES: Peripheral joint ROM is full and pain free without obvious instability or laxity in all four extremities. No deformities, edema, or skin discoloration. Good capillary refill.  MUSCULOSKELETAL: Shoulder, hip, and knee provocative maneuvers are negative.  There is minimal pain with palpation over the sacroiliac joints bilaterally.  FABERs test is negative.  FADIRs test is negative.   Bilateral upper and lower extremity strength is normal and symmetric.  No atrophy or tone abnormalities are noted.  NEURO: Bilateral upper and lower extremity coordination and muscle stretch reflexes are physiologic and symmetric.  Plantar response are downgoing. No clonus.  No loss of sensation is noted.  GAIT: normal.      LABS:  Lab Results   Component Value Date    WBC 12.61 01/15/2025    HGB 11.3 (L) 01/15/2025    HCT 37.4 01/15/2025    MCV 83 01/15/2025     01/15/2025       CMP  Sodium   Date Value Ref Range Status   05/06/2024 138 136 - 145 mmol/L Final     Potassium   Date Value Ref Range Status   05/06/2024 4.1 3.5 - 5.1 mmol/L Final     Chloride   Date Value Ref Range Status   05/06/2024 105 95 - 110 mmol/L Final     CO2   Date Value Ref Range Status   05/06/2024 21 (L) 23 - 29 mmol/L Final      Glucose   Date Value Ref Range Status   05/06/2024 109 70 - 110 mg/dL Final     BUN   Date Value Ref Range Status   05/06/2024 10 6 - 20 mg/dL Final     Creatinine   Date Value Ref Range Status   05/06/2024 0.7 0.5 - 1.4 mg/dL Final     Calcium   Date Value Ref Range Status   05/06/2024 9.4 8.7 - 10.5 mg/dL Final     Total Protein   Date Value Ref Range Status   05/06/2024 8.0 6.0 - 8.4 g/dL Final     Albumin   Date Value Ref Range Status   05/06/2024 3.8 3.5 - 5.2 g/dL Final     Total Bilirubin   Date Value Ref Range Status   05/06/2024 0.4 0.1 - 1.0 mg/dL Final     Comment:     For infants and newborns, interpretation of results should be based  on gestational age, weight and in agreement with clinical  observations.    Premature Infant recommended reference ranges:  Up to 24 hours.............<8.0 mg/dL  Up to 48 hours............<12.0 mg/dL  3-5 days..................<15.0 mg/dL  6-29 days.................<15.0 mg/dL       Alkaline Phosphatase   Date Value Ref Range Status   05/06/2024 91 55 - 135 U/L Final     AST   Date Value Ref Range Status   05/06/2024 16 10 - 40 U/L Final     ALT   Date Value Ref Range Status   05/06/2024 12 10 - 44 U/L Final     Anion Gap   Date Value Ref Range Status   05/06/2024 12 8 - 16 mmol/L Final     eGFR if    Date Value Ref Range Status   11/14/2021 SEE COMMENT >60 mL/min/1.73 m^2 Final     eGFR if non    Date Value Ref Range Status   11/14/2021 SEE COMMENT >60 mL/min/1.73 m^2 Final     Comment:     Calculation used to obtain the estimated glomerular filtration  rate (eGFR) is the CKD-EPI equation.   Test not performed.  GFR calculation is only valid for patients   18 and older.         Lab Results   Component Value Date    HGBA1C 5.8 (H) 12/18/2024             ASSESSMENT: 19 y.o. year old female with neck and back pain, consistent with     1. Chronic myofascial pain        2. Stress and adjustment reaction                PLAN:   -  Interventions:  None at this time    - Anticoagulation use:  None    - Medications: I have stressed the importance of physical activity and a home exercise plan to help with pain and improve health. and Patient can continue with medications for now since they are providing benefits, using them appropriately, and without side effects.       Trial Robaxin 500 mg b.I.d. p.r.n.  Strongly advised patient gets back on her Wellbutrin for mood disorder management          - Therapy: Referral to Physical therapy for chronic neck and back pain    - Psychological:  Discussed coping mechanisms to help address chronic pain  and stress issues    - Labs:  Reviewed    - Imaging: Reviewed available imaging with patient and answered any questions they had regarding study.    - Consults/Referrals:   none at this time    - Records:  Reviewed/Obtain old records from outside physicians and imaging    - Follow up visit: return to clinic as needed    - Counseled patient regarding the importance of activity modification and physical therapy    - This condition does not require this patient to take time off of work, and the primary goal of our Pain Management services is to improve the patient's functional capacity.    - Patient Questions: Answered all of the patient's questions regarding diagnosis, therapy, and treatment        The above plan and management options were discussed at length with patient. Patient is in agreement with the above and verbalized understanding.    I discussed the goals of interventional chronic pain management with the patient on today's visit.  I explained the utility of injections for diagnostic and therapeutic purposes.  We discussed a multimodal approach to pain including treating the patient's given worst pain at any given time.  We will use a systematic approach to addressing pain.  We will also adopt a multimodal approach that includes injections, adjuvant medications, physical therapy, at times psychiatry.   There may be a limited role for opioid use intermittently in the treatment of pain, more particularly for acute pain although no one approach can be used as a sole treatment modality.    I emphasized the importance of regular exercise, core strengthening and stretching, diet and weight loss as a cornerstone of long-term pain management.      Seven Marina MD  Interventional Pain Management  [facility]    Disclaimer:  This note was prepared using voice recognition system and is likely to have sound alike errors that may have been overlooked even after proof reading.  Please call me with any questions

## 2025-01-10 ENCOUNTER — CLINICAL SUPPORT (OUTPATIENT)
Dept: REHABILITATION | Facility: HOSPITAL | Age: 20
End: 2025-01-10
Payer: COMMERCIAL

## 2025-01-10 DIAGNOSIS — G89.29 CHRONIC MIDLINE LOW BACK PAIN WITHOUT SCIATICA: Primary | ICD-10-CM

## 2025-01-10 DIAGNOSIS — M54.50 CHRONIC MIDLINE LOW BACK PAIN WITHOUT SCIATICA: Primary | ICD-10-CM

## 2025-01-10 DIAGNOSIS — M54.2 NECK PAIN: ICD-10-CM

## 2025-01-10 PROCEDURE — 97750 PHYSICAL PERFORMANCE TEST: CPT | Mod: 32,PN

## 2025-01-12 NOTE — PROGRESS NOTES
[facility]  Physical Therapy Treatment Note     Name: Natalia Sue  Clinic Number: [id number]    Therapy Diagnosis:   Encounter Diagnoses   Name Primary?    Chronic midline low back pain without sciatica Yes    Neck pain      Physician: Seven Marina MD    Visit Date: 1/6/2025    Physician Orders: Evaluate and treat - Healthy back  Medical Diagnosis: Myofascial pain  Evaluation Date: 11/15/2024  Authorization Period Expiration: 12/31/2024  Reassessment Due: 1/14/2025  Plan of Care Certification Period: 11/15/2024 to 1/14/2025  Visit #/Visits authorized: 9/20   1  /10  MedX Testing:MedX testing visit 2    PTA Visit #: 0/5     Time In: 0625  Time Out: 0730  Total Billable Time: 55 minutes  INSURANCE and OUTCOMES: Program Benefit Group with Cervical Outcomes (NDI and AQoL) 2/3    Precautions: standard    Pattern of pain determined:     Subjective     Natalia Sue  complaints of tightness across the neck and shoulders.      Patient reports tolerating previous visit well  Patient reports their pain to be 2/10 on a 0-10 scale with 0 being no pain and 10 being the worst pain imaginable.  Pain Location: Neck and low back     Work and leisure: She works everyday  Pt goals: to decrease pain    Objective      Cervical  Isometric Testing on Med X equipment: Testing administered by PT     Test Initial Baseline Midpoint Final   Date 11/19/2024 12/20/2024     ROM  72 deg 90 deg     Max Peak Torque  110 139     Min Peak Torque  50 128     Flex/Ext Ratio         % variance normative data -61%  -23%     % change from initial test N/A visit 1  38%30     Lumbar  Isometric Testing on Med X equipment: Testing administered by PT    Test Initial Baseline Midpoint Final   Date 11/19/2024 12/16/2024    ROM  42 deg 52    Max Peak Torque  77 91    Min Peak Torque  35 49    Flex/Ext Ratio      % variance  normative data -63% -46%    % change from initial test N/A visit 1 17%         Outcomes:  Intake Score: 60%  Visit 6 Score:   Visit 10 Score: 69%  Discharge Score:  Goal Score: 69%     Treatment     Natalia received the treatments listed below:        CPT Intervention  Neck and low back pain Duration / Intensity      X TE Recumbent bike  5 minutes   X  NMR MedX lumbar extension Healthy Back     NMR MedX cervical extension Healthy Back   X  NMR MedX trunk rotation  20#x20   X NMR Cable rows  30# 2x10   X NMR Standing T Red tubing 3x10   X TE Bridges 3x20 seconds   X TE Hamstring stretch with strap 3x20 seconds   X NMR PPT 2x20   X TE Piriformis stretch 3x20 seconds   X NMR Supine cervical chin tuck      X NMR Open book      X NMR Prone cervical retraction      MT Dry Needling     X MT  Thoracic AP springing and STM                                                         PLAN             CPT Codes available for Billing:   (10) minutes of Manual therapy (MT) to improve pain and ROM.  (15) minutes of Therapeutic Exercise (TE) to develop strength, endurance, range of motion, and flexibility.  (25) minutes of Neuromuscular Re-Education (NMR)  to improve: Balance, Coordination, Kinesthetic, Sense, Proprioception, and Posture.  (00) minutes of Therapeutic Activities (TA) to improve functional performance.       Patient Education and Home Exercises     Home exercises include:  Bridges  Clamshells  Dynamic Hamstring stretches  Cardio program (V5): -  Lifting education (V11): -  Posture/Lumbar roll:   Fridge Magnet Discharge handout (date given): -  Equipment at home/gym membership: Yes    Education provided:   - PT role and POC  - HEP    Written Home Exercises Provided: Patient instructed to cont prior HEP.  Exercises were reviewed and Natalia was able to demonstrate them prior to the end of the session.  Natalia demonstrated good  understanding of the education provided.     See EMR under Patient Instructions for exercises provided prior visit.    Assessment   The patient is progressing with core  exercises and strengthening  of postural and cervical regions. She is able to perform multilevel self manipulation of the neck bilaterally.  She has stiffness and received STM and manual therapy to the thoracic and cervical spine.    Patient is making good progress towards established goals.  Pt will continue to benefit from skilled outpatient physical therapy to address the deficits stated in the impairment chart, provide pt/family education and to maximize pt's level of independence in the home and community environment.     Anticipated Barriers for therapy: None  Pt's spiritual, cultural and educational needs considered and pt agreeable to plan of care and goals as stated below:     Goals:   Short term goals:  6 weeks or 10 visits   - Pt will demonstrate increased lumbar MedX ROM by at least 5 degrees from the initial ROM value with improvements noted in functional ROM and ability to perform ADLs. Appropriate and Ongoing  - Pt will demonstrate increased MedX average isometric strength value by 5% from initial test resulting in improved ability to perform bending, lifting, and carrying activities safely, confidently. Appropriate and Ongoing  - Pt will report a reduction in worst pain score by 1-2 points for improved tolerance for daily work related activity. Appropriate and Ongoing  - Pt able to perform HEP correctly with minimal cueing or supervision from therapist to encourage independent management of symptoms. Appropriate and Ongoing     Long term goals: 10 weeks or 20 visits   - Pt will demonstrate increased lumbar MedX ROM by at least 10 degrees from initial ROM value, resulting in improved ability to perform functional forward bending while standing and sitting. Appropriate and Ongoing  - Pt will demonstrate increased MedX average isometric strength value by 10% from initial test resulting in improved ability to perform bending, lifting, and carrying activities safely and confidently. Appropriate and  OngAoing  - Pt to demonstrate ability to independently control and reduce their pain through posture positioning and mechanical movements throughout a typical day. Appropriate and Ongoing  - Pt will demonstrate independence with the HEP at discharge. Appropriate and Ongoing    Plan     Continue with established Plan of Care towards established PT goals.     Therapist: Jesus Iraheta, PT  1/12/2025

## 2025-01-13 ENCOUNTER — CLINICAL SUPPORT (OUTPATIENT)
Dept: REHABILITATION | Facility: HOSPITAL | Age: 20
End: 2025-01-13
Payer: COMMERCIAL

## 2025-01-13 DIAGNOSIS — M54.50 CHRONIC MIDLINE LOW BACK PAIN WITHOUT SCIATICA: Primary | ICD-10-CM

## 2025-01-13 DIAGNOSIS — M54.2 NECK PAIN: ICD-10-CM

## 2025-01-13 DIAGNOSIS — G89.29 CHRONIC MIDLINE LOW BACK PAIN WITHOUT SCIATICA: Primary | ICD-10-CM

## 2025-01-13 PROCEDURE — 97750 PHYSICAL PERFORMANCE TEST: CPT | Mod: 32,PN

## 2025-01-13 NOTE — PROGRESS NOTES
[facility]  Physical Therapy Treatment Note     Name: Natalia Sue  Clinic Number: [id number]    Therapy Diagnosis:   Encounter Diagnoses   Name Primary?    Chronic midline low back pain without sciatica Yes    Neck pain        Physician: Seven Marina MD    Visit Date: 1/13/2025    Physician Orders: Evaluate and treat - Healthy back  Medical Diagnosis: Myofascial pain  Evaluation Date: 11/15/2024  Authorization Period Expiration: 12/31/2024  Reassessment Due: 1/14/2025  Plan of Care Certification Period: 11/15/2024 to 1/14/2025  Visit #/Visits authorized: 9/20   4 /10  MedX Testing:MedX testing visit 2    PTA Visit #: 0/5     Time In: 0645  Time Out: 0730  Total Billable Time: 45 minutes  INSURANCE and OUTCOMES: Program Benefit Group with Cervical Outcomes (NDI and AQoL) 2/3    Precautions: standard    Pattern of pain determined:     Subjective     Natalia Sue she  is doing ok today    Patient reports tolerating previous visit well  Patient reports their pain to be 1/10 on a 0-10 scale with 0 being no pain and 10 being the worst pain imaginable.  Pain Location: Neck and low back     Work and leisure: She works everyday  Pt goals: to decrease pain    Objective      Cervical  Isometric Testing on Med X equipment: Testing administered by PT     Test Initial Baseline Midpoint Final   Date 11/19/2024 12/20/2024     ROM  72 deg 90 deg     Max Peak Torque  110 139     Min Peak Torque  50 128     Flex/Ext Ratio         % variance normative data -61%  -23%     % change from initial test N/A visit 1  38%30     Lumbar  Isometric Testing on Med X equipment: Testing administered by PT    Test Initial Baseline Midpoint Final   Date 11/19/2024 12/16/2024    ROM  42 deg 52    Max Peak Torque  77 91    Min Peak Torque  35 49    Flex/Ext Ratio      % variance  normative data -63% -46%    % change from initial test N/A visit 1 17%        Outcomes:  Intake Score: 60%  Visit 6  Score:   Visit 10 Score: 69%  Discharge Score:  Goal Score: 69%     Treatment     Natalia received the treatments listed below:        CPT Intervention  Neck and low back pain Duration / Intensity      X TE Recumbent bike  5 minutes   X  NMR MedX lumbar extension Healthy Back     NMR MedX cervical extension Healthy Back   X  NMR MedX trunk rotation  20#x20   X NMR Cable rows  30# 2x10   X NMR Standing T Red tubing 3x10   X TE Bridges 3x20 seconds   X TE Hamstring stretch with strap 3x20 seconds   X NMR PPT 2x20   X TE Piriformis stretch 3x20 seconds   X NMR Supine cervical chin tuck      X NMR Open book     X NMR Prone cervical retraction      MT Dry Needling     X MT  Thoracic AP springing and STM                                                         PLAN             CPT Codes available for Billing:   (00) minutes of Manual therapy (MT) to improve pain and ROM.  (20) minutes of Therapeutic Exercise (TE) to develop strength, endurance, range of motion, and flexibility.  (25) minutes of Neuromuscular Re-Education (NMR)  to improve: Balance, Coordination, Kinesthetic, Sense, Proprioception, and Posture.  (00) minutes of Therapeutic Activities (TA) to improve functional performance.       Patient Education and Home Exercises     Home exercises include:  Jose Alfredo Christianson  Dynamic Hamstring stretches  Cardio program (V5): -  Lifting education (V11): -  Posture/Lumbar roll:   Fridge Magnet Discharge handout (date given): -  Equipment at home/gym membership: Yes    Education provided:   - PT role and POC  - HEP    Written Home Exercises Provided: Patient instructed to cont prior HEP.  Exercises were reviewed and Natalia was able to demonstrate them prior to the end of the session.  Ricardorachelle demonstrated good  understanding of the education provided.     See EMR under Patient Instructions for exercises provided prior visit.    Assessment   The patient is progressing with core exercises and strengthening  of postural and  cervical regions. She is able to perform multilevel self manipulation of the neck bilaterally.  She has stiffness and received STM and manual therapy to the thoracic and cervical spine.    Patient is making good progress towards established goals.  Pt will continue to benefit from skilled outpatient physical therapy to address the deficits stated in the impairment chart, provide pt/family education and to maximize pt's level of independence in the home and community environment.     Anticipated Barriers for therapy: None  Pt's spiritual, cultural and educational needs considered and pt agreeable to plan of care and goals as stated below:     Goals:   Short term goals:  6 weeks or 10 visits   - Pt will demonstrate increased lumbar MedX ROM by at least 5 degrees from the initial ROM value with improvements noted in functional ROM and ability to perform ADLs. Appropriate and Ongoing  - Pt will demonstrate increased MedX average isometric strength value by 5% from initial test resulting in improved ability to perform bending, lifting, and carrying activities safely, confidently. Appropriate and Ongoing  - Pt will report a reduction in worst pain score by 1-2 points for improved tolerance for daily work related activity. Appropriate and Ongoing  - Pt able to perform HEP correctly with minimal cueing or supervision from therapist to encourage independent management of symptoms. Appropriate and Ongoing     Long term goals: 10 weeks or 20 visits   - Pt will demonstrate increased lumbar MedX ROM by at least 10 degrees from initial ROM value, resulting in improved ability to perform functional forward bending while standing and sitting. Appropriate and Ongoing  - Pt will demonstrate increased MedX average isometric strength value by 10% from initial test resulting in improved ability to perform bending, lifting, and carrying activities safely and confidently. Appropriate and OngAoing  - Pt to demonstrate ability to  independently control and reduce their pain through posture positioning and mechanical movements throughout a typical day. Appropriate and Ongoing  - Pt will demonstrate independence with the HEP at discharge. Appropriate and Ongoing    Plan     Continue with established Plan of Care towards established PT goals.     Therapist: Jesus Iraheta, PT  1/20/2025

## 2025-01-15 ENCOUNTER — LAB VISIT (OUTPATIENT)
Dept: LAB | Facility: HOSPITAL | Age: 20
End: 2025-01-15
Attending: FAMILY MEDICINE
Payer: COMMERCIAL

## 2025-01-15 ENCOUNTER — OFFICE VISIT (OUTPATIENT)
Dept: INTERNAL MEDICINE | Facility: CLINIC | Age: 20
End: 2025-01-15
Payer: COMMERCIAL

## 2025-01-15 VITALS
HEART RATE: 98 BPM | DIASTOLIC BLOOD PRESSURE: 60 MMHG | SYSTOLIC BLOOD PRESSURE: 100 MMHG | RESPIRATION RATE: 18 BRPM | BODY MASS INDEX: 30.45 KG/M2 | TEMPERATURE: 97 F | OXYGEN SATURATION: 98 % | HEIGHT: 57 IN | WEIGHT: 141.13 LBS

## 2025-01-15 DIAGNOSIS — E66.811 CLASS 1 OBESITY DUE TO EXCESS CALORIES WITH SERIOUS COMORBIDITY AND BODY MASS INDEX (BMI) OF 30.0 TO 30.9 IN ADULT: Chronic | ICD-10-CM

## 2025-01-15 DIAGNOSIS — F41.9 RECURRENT MILD MAJOR DEPRESSIVE DISORDER WITH ANXIETY: Chronic | ICD-10-CM

## 2025-01-15 DIAGNOSIS — E66.09 CLASS 1 OBESITY DUE TO EXCESS CALORIES WITH SERIOUS COMORBIDITY AND BODY MASS INDEX (BMI) OF 30.0 TO 30.9 IN ADULT: Chronic | ICD-10-CM

## 2025-01-15 DIAGNOSIS — F33.0 RECURRENT MILD MAJOR DEPRESSIVE DISORDER WITH ANXIETY: Chronic | ICD-10-CM

## 2025-01-15 DIAGNOSIS — D72.828 GRANULOCYTOSIS: Chronic | ICD-10-CM

## 2025-01-15 DIAGNOSIS — L21.9 SEBORRHEIC DERMATITIS: Chronic | ICD-10-CM

## 2025-01-15 DIAGNOSIS — R73.03 PREDIABETES: Chronic | ICD-10-CM

## 2025-01-15 DIAGNOSIS — R31.29 MICROSCOPIC HEMATURIA: Primary | Chronic | ICD-10-CM

## 2025-01-15 LAB
BASOPHILS # BLD AUTO: 0.05 K/UL (ref 0–0.2)
BASOPHILS NFR BLD: 0.4 % (ref 0–1.9)
CRP SERPL-MCNC: 6.2 MG/L (ref 0–8.2)
DIFFERENTIAL METHOD BLD: ABNORMAL
EOSINOPHIL # BLD AUTO: 0.2 K/UL (ref 0–0.5)
EOSINOPHIL NFR BLD: 1.6 % (ref 0–8)
ERYTHROCYTE [DISTWIDTH] IN BLOOD BY AUTOMATED COUNT: 14.1 % (ref 11.5–14.5)
ERYTHROCYTE [SEDIMENTATION RATE] IN BLOOD BY PHOTOMETRIC METHOD: 31 MM/HR (ref 0–36)
HCT VFR BLD AUTO: 37.4 % (ref 37–48.5)
HGB BLD-MCNC: 11.3 G/DL (ref 12–16)
IMM GRANULOCYTES # BLD AUTO: 0.05 K/UL (ref 0–0.04)
IMM GRANULOCYTES NFR BLD AUTO: 0.4 % (ref 0–0.5)
LDH SERPL L TO P-CCNC: 393 U/L (ref 110–260)
LYMPHOCYTES # BLD AUTO: 3.5 K/UL (ref 1–4.8)
LYMPHOCYTES NFR BLD: 27.4 % (ref 18–48)
MCH RBC QN AUTO: 25 PG (ref 27–31)
MCHC RBC AUTO-ENTMCNC: 30.2 G/DL (ref 32–36)
MCV RBC AUTO: 83 FL (ref 82–98)
MONOCYTES # BLD AUTO: 0.9 K/UL (ref 0.3–1)
MONOCYTES NFR BLD: 7.2 % (ref 4–15)
NEUTROPHILS # BLD AUTO: 8 K/UL (ref 1.8–7.7)
NEUTROPHILS NFR BLD: 63 % (ref 38–73)
NRBC BLD-RTO: 0 /100 WBC
PLATELET # BLD AUTO: 427 K/UL (ref 150–450)
PMV BLD AUTO: 10.8 FL (ref 9.2–12.9)
RBC # BLD AUTO: 4.52 M/UL (ref 4–5.4)
WBC # BLD AUTO: 12.61 K/UL (ref 3.9–12.7)

## 2025-01-15 PROCEDURE — 36415 COLL VENOUS BLD VENIPUNCTURE: CPT | Performed by: FAMILY MEDICINE

## 2025-01-15 PROCEDURE — 1159F MED LIST DOCD IN RCRD: CPT | Mod: CPTII,S$GLB,, | Performed by: FAMILY MEDICINE

## 2025-01-15 PROCEDURE — 99999 PR PBB SHADOW E&M-EST. PATIENT-LVL IV: CPT | Mod: PBBFAC,,, | Performed by: FAMILY MEDICINE

## 2025-01-15 PROCEDURE — 83615 LACTATE (LD) (LDH) ENZYME: CPT | Performed by: FAMILY MEDICINE

## 2025-01-15 PROCEDURE — 99214 OFFICE O/P EST MOD 30 MIN: CPT | Mod: S$GLB,,, | Performed by: FAMILY MEDICINE

## 2025-01-15 PROCEDURE — 3078F DIAST BP <80 MM HG: CPT | Mod: CPTII,S$GLB,, | Performed by: FAMILY MEDICINE

## 2025-01-15 PROCEDURE — 1160F RVW MEDS BY RX/DR IN RCRD: CPT | Mod: CPTII,S$GLB,, | Performed by: FAMILY MEDICINE

## 2025-01-15 PROCEDURE — G2211 COMPLEX E/M VISIT ADD ON: HCPCS | Mod: S$GLB,,, | Performed by: FAMILY MEDICINE

## 2025-01-15 PROCEDURE — 86140 C-REACTIVE PROTEIN: CPT | Performed by: FAMILY MEDICINE

## 2025-01-15 PROCEDURE — 3074F SYST BP LT 130 MM HG: CPT | Mod: CPTII,S$GLB,, | Performed by: FAMILY MEDICINE

## 2025-01-15 PROCEDURE — 3008F BODY MASS INDEX DOCD: CPT | Mod: CPTII,S$GLB,, | Performed by: FAMILY MEDICINE

## 2025-01-15 PROCEDURE — 85652 RBC SED RATE AUTOMATED: CPT | Performed by: FAMILY MEDICINE

## 2025-01-15 PROCEDURE — 85025 COMPLETE CBC W/AUTO DIFF WBC: CPT | Performed by: FAMILY MEDICINE

## 2025-01-15 RX ORDER — HYDROCORTISONE 25 MG/G
CREAM TOPICAL
Qty: 20 G | Refills: 0 | Status: SHIPPED | OUTPATIENT
Start: 2025-01-15

## 2025-01-15 RX ORDER — KETOCONAZOLE 20 MG/ML
SHAMPOO, SUSPENSION TOPICAL
Qty: 240 ML | Refills: 2 | Status: SHIPPED | OUTPATIENT
Start: 2025-01-15

## 2025-01-15 RX ORDER — KETOCONAZOLE 20 MG/G
CREAM TOPICAL 2 TIMES DAILY
Qty: 30 G | Refills: 1 | Status: SHIPPED | OUTPATIENT
Start: 2025-01-15 | End: 2025-01-29

## 2025-01-15 RX ORDER — BUPROPION HYDROCHLORIDE 150 MG/1
150 TABLET ORAL EVERY MORNING
Qty: 30 TABLET | Refills: 2 | Status: SHIPPED | OUTPATIENT
Start: 2025-01-15

## 2025-01-15 NOTE — PATIENT INSTRUCTIONS
"Need Support Now?  If you or someone you know is struggling or in crisis, help is available. Call or text 274 or chat [place]  [facility] offers 24/7 access to trained crisis counselors who can help people experiencing mental health-related distress. That could be:  thoughts of suicide,  mental health or substance use crisis, or  any other kind of emotion distress.         --------------------------------------------------------------------------------     The [facility] is currently available only to [facility] employees and their spouses who have [facility] insurance.    To register for the program:  Visit [email or web address].  Log in to your [facility] account.  Select the "Weight Management" option and follow the prompts.  Within a couple of weeks after registering, you will receive a [facility] message or a phone call with instructions on scheduling your first appointment.    Currently, the wait time for a new patient appointment is over four months. However, if you choose to join the waitlist for an earlier appointment, you may be able to secure a sooner date.    If you have any questions about the program, visit their FAQ page or call the [facility] customer service team at [phone].   "

## 2025-01-15 NOTE — PROGRESS NOTES
OFFICE VISIT 1/15/25  2:40 PM CST    History of Present Illness    CHIEF COMPLAINT:  Natalia presents today for follow-up on multiple concerns including dry skin, depression, and weight management.    SEBORRHEIC DERMATITIS:  She presents with dry patches on face, scalp, and ears, consistent with seborrheic dermatitis. She reports flaking in the eyebrow area, though improved from previous years. She uses Aveeno eczema lotion every morning for management. Previously prescribed Nizoral cream and shampoo were ineffective. She continues to use the prescribed cream for her ears but experiences persistent dryness. She has an established history with dermatology for this condition. Weekly hair appointments impact her ability to maintain consistent treatment routine.    DEPRESSION AND ANXIETY:  She reports predominantly depression with some anxiety. She was recently prescribed bupropion by her mental health nurse practitioner but has not initiated treatment. She previously took bupropion 150 mg with good results. She endorses past thoughts of self-harm a few months ago but denies current plans or intentions.    MEDICAL HISTORY:  She has history of elevated white blood cell count with unknown etiology.    EXERCISE:  She has a gym membership but is not currently utilizing it.       Except as noted herein, ROS is otherwise negative.    Assessment & Plan    L21.9 Seborrheic dermatitis, unspecified  F32.A Depression, unspecified  F41.9 Anxiety disorder, unspecified  E66.9 Obesity, unspecified  D72.829 Elevated white blood cell count, unspecified     Diagnosed seborrheic dermatitis   Recommend first-line treatment with Nizoral cream and shampoo, despite previous unsuccessful attempt   Assessed depression and anxiety, with depression being more prominent   Evaluated suicide risk and provided crisis intervention resources   Recommend restarting bupropion for depression management   Ordered labs to follow up on persistently elevated  white blood cell count   Identified patient as qualifying for [facility] due to BMI >30 and prediabetes    PATIENT EDUCATION:   Explained seborrheic dermatitis as a chronic condition that may require ongoing management   Discussed proper ear cleaning technique using soap and water on a washcloth   Educated on the use of mineral oil for lubricating dry ear canals   Provided information about [address] [place] [address] hotline for mental health emergencies   Explained the stimulating nature of bupropion and potential side effects    ACTION ITEMS/LIFESTYLE:   Shynia to use mineral oil drops in ears after showering to lubricate and prevent itching   Shynia to clean ears with soap and water on a washcloth, allowing shower to rinse   Recommend considering participation in the [facility]   Shynia to call [phone] if experiencing thoughts of self-harm that are difficult to resist    MEDICATIONS:   Started Nizoral cream: Apply topically 2 times daily for 14 days, then once weekly for maintenance   Started Nizoral shampoo: Use 2 times per week for 4 weeks   Started hydrocortisone cream: Apply sparingly to pruritic areas, including inside ears   Restarted bupropion (Wellbutrin) 150 mg: Take daily in the morning    ORDERS:   CBC ordered to follow up on elevated white blood cell count    REFERRALS:   Offered referral to dermatology if first-line treatment for seborrheic dermatitis is ineffective    FOLLOW UP:   Follow up in 1-2 months to reevaluate depression treatment   Contact the office if experiencing adverse side effects from bupropion       1. Microscopic hematuria    2. Seborrheic dermatitis  -     ketoconazole (NIZORAL) 2 % cream; Apply topically 2 (two) times daily. for 14 days  Dispense: 30 g; Refill: 1  -     ketoconazole (NIZORAL) 2 % shampoo; Apply to a clean scalp and skin under facial hair twice weekly for 4 weeks.  Dispense: 240 mL; Refill: 2  -      "hydrocortisone 2.5 % cream; Apply SPARINGLY to areas of skin irritation twice daily as needed (Use only as needed, max use 5 days.)  Dispense: 20 g; Refill: 0    3. Recurrent mild major depressive disorder with anxiety  -     buPROPion (WELLBUTRIN XL) 150 MG TB24 tablet; Take 1 tablet (150 mg total) by mouth every morning.  Dispense: 30 tablet; Refill: 2    4. Granulocytosis  -     Lactate Dehydrogenase; Future; Expected date: 01/15/2025  -     C-Reactive Protein; Future; Expected date: 01/15/2025  -     Sedimentation rate; Future; Expected date: 01/15/2025  -     CBC Auto Differential; Future; Expected date: 01/15/2025    5. Class 1 obesity due to excess calories with serious comorbidity and body mass index (BMI) of 30.0 to 30.9 in adult    6. Prediabetes    No other significant complaints or concerns were reported.  Today's visit involved the intricate management of episodic problem(s) and the ongoing care for the patient's serious or complex condition(s) listed above, reflecting the inherent complexity of providing longitudinal, comprehensive evaluation and management as the central hub for the patient's primary care services.  Vitals:    01/15/25 1506   BP: 100/60   BP Location: Right arm   Patient Position: Sitting   Pulse: 98   Resp: 18   Temp: 97.1 °F (36.2 °C)   TempSrc: Tympanic   SpO2: 98%   Weight: 64 kg (141 lb 1.5 oz)   Height: 4' 9" (1.448 m)   Physical Exam  Vitals reviewed.   Constitutional:       General: She is not in acute distress.     Appearance: Normal appearance. She is not ill-appearing, toxic-appearing or diaphoretic.   HENT:      Head: Normocephalic and atraumatic.      Right Ear: Tympanic membrane, ear canal and external ear normal.      Left Ear: Tympanic membrane, ear canal and external ear normal.   Eyes:      General: No scleral icterus.     Conjunctiva/sclera: Conjunctivae normal.   Neck:      Vascular: No carotid bruit.   Cardiovascular:      Rate and Rhythm: Normal rate and regular " "rhythm.      Heart sounds: Normal heart sounds.   Pulmonary:      Effort: Pulmonary effort is normal.      Breath sounds: Normal breath sounds.   Abdominal:      General: Bowel sounds are normal. There is no distension.      Palpations: Abdomen is soft. There is no mass.      Tenderness: There is no abdominal tenderness.   Musculoskeletal:         General: No tenderness.      Cervical back: No muscular tenderness.   Lymphadenopathy:      Cervical: No cervical adenopathy.   Skin:     General: Skin is warm and dry.      Coloration: Skin is not jaundiced.   Neurological:      General: No focal deficit present.      Mental Status: She is alert and oriented to person, place, and time.      Cranial Nerves: No cranial nerve deficit.      Gait: Gait normal.   Psychiatric:         Mood and Affect: Mood normal.         Behavior: Behavior normal.         Judgment: Judgment normal.       This note was generated with the assistance of ambient listening technology. Verbal consent was obtained by the patient and accompanying visitor(s) for the recording of patient appointment to facilitate this note. I attest to having reviewed and edited the generated note for accuracy, though some syntax or spelling errors may persist. Please contact the author of this note for any clarification.    Documentation entered by me for this encounter may have been done in part using speech-recognition technology. Although I have made an effort to ensure accuracy, "sound like" errors may exist and should be interpreted in context.   "

## 2025-01-16 ENCOUNTER — OFFICE VISIT (OUTPATIENT)
Dept: UROLOGY | Facility: CLINIC | Age: 20
End: 2025-01-16
Payer: COMMERCIAL

## 2025-01-16 VITALS
TEMPERATURE: 98 F | DIASTOLIC BLOOD PRESSURE: 76 MMHG | HEIGHT: 57 IN | BODY MASS INDEX: 30.42 KG/M2 | HEART RATE: 77 BPM | SYSTOLIC BLOOD PRESSURE: 113 MMHG | WEIGHT: 141 LBS

## 2025-01-16 DIAGNOSIS — N39.41 URGE INCONTINENCE OF URINE: ICD-10-CM

## 2025-01-16 DIAGNOSIS — N39.44 NOCTURNAL ENURESIS: ICD-10-CM

## 2025-01-16 DIAGNOSIS — R31.29 OTHER MICROSCOPIC HEMATURIA: ICD-10-CM

## 2025-01-16 LAB
BACTERIA #/AREA URNS HPF: ABNORMAL /HPF
MICROSCOPIC COMMENT: ABNORMAL
RBC #/AREA URNS HPF: 14 /HPF (ref 0–4)
WBC #/AREA URNS HPF: 2 /HPF (ref 0–5)

## 2025-01-16 PROCEDURE — 81000 URINALYSIS NONAUTO W/SCOPE: CPT | Performed by: UROLOGY

## 2025-01-16 PROCEDURE — 87086 URINE CULTURE/COLONY COUNT: CPT | Performed by: UROLOGY

## 2025-01-16 PROCEDURE — 99999 PR PBB SHADOW E&M-EST. PATIENT-LVL IV: CPT | Mod: PBBFAC,,, | Performed by: UROLOGY

## 2025-01-16 PROCEDURE — 99204 OFFICE O/P NEW MOD 45 MIN: CPT | Mod: S$GLB,,, | Performed by: UROLOGY

## 2025-01-16 PROCEDURE — 1159F MED LIST DOCD IN RCRD: CPT | Mod: CPTII,S$GLB,, | Performed by: UROLOGY

## 2025-01-16 PROCEDURE — 3078F DIAST BP <80 MM HG: CPT | Mod: CPTII,S$GLB,, | Performed by: UROLOGY

## 2025-01-16 PROCEDURE — 3074F SYST BP LT 130 MM HG: CPT | Mod: CPTII,S$GLB,, | Performed by: UROLOGY

## 2025-01-16 PROCEDURE — 3008F BODY MASS INDEX DOCD: CPT | Mod: CPTII,S$GLB,, | Performed by: UROLOGY

## 2025-01-16 RX ORDER — MIRABEGRON 50 MG/1
50 TABLET, FILM COATED, EXTENDED RELEASE ORAL DAILY
Qty: 30 TABLET | Refills: 5 | Status: SHIPPED | OUTPATIENT
Start: 2025-01-16 | End: 2025-07-15

## 2025-01-16 NOTE — PROGRESS NOTES
Chief Complaint:   Encounter Diagnoses   Name Primary?    Nocturnal enuresis     Urge incontinence of urine     Other microscopic hematuria        HPI:  HPI Natalia Sue german 19 y.o. female who presents with a few complaints.  She has a history of nocturnal enuresis dating back to childhood.  At that time she was having almost nightly accidents.  Now she is having an accident about once a month.  She has never been treated for this in the past.  She also complains of daytime urgency and urge incontinence.  She states she has had 2 accidents in the past month both at home.  She limits her fluids during the day so she can work without issues.  She does have to still rush to the bathroom when she has urgency.  She does have some daytime frequency as well.  She denies any dysuria or hematuria.  She has a rare urinary tract infections.  She was also recently found to have some microscopic hematuria unrelated to her menstrual cycle.  She denies any flank pain.  She denies any dysuria.    History:  Social History     Tobacco Use    Smoking status: Never     Passive exposure: Past    Smokeless tobacco: Never   Substance Use Topics    Alcohol use: Yes    Drug use: Yes     Types: Marijuana     Past Medical History:   Diagnosis Date    Bronchitis     Pre-diabetes      Past Surgical History:   Procedure Laterality Date    WISDOM TOOTH EXTRACTION       Family History   Problem Relation Name Age of Onset    No Known Problems Paternal Grandmother      Diabetes Maternal Grandmother      No Known Problems Maternal Grandfather      Diabetes Father      Diabetes Mother         Current Outpatient Medications on File Prior to Visit   Medication Sig Dispense Refill    buPROPion (WELLBUTRIN XL) 150 MG TB24 tablet Take 1 tablet (150 mg total) by mouth every morning. 30 tablet 2    ciclopirox (LOPROX) 0.77 % Susp Apply topically 2 (two) times daily. 30 mL 1    ferrous sulfate (FEOSOL) 325 mg (65 mg iron) Tab tablet Take 325 mg by mouth  "once daily.      hydrocortisone 2.5 % cream Apply SPARINGLY to areas of skin irritation twice daily as needed (Use only as needed, max use 5 days.) 20 g 0    ketoconazole (NIZORAL) 2 % cream Apply topically 2 (two) times daily. for 14 days 30 g 1    ketoconazole (NIZORAL) 2 % shampoo Apply to a clean scalp and skin under facial hair twice weekly for 4 weeks. 240 mL 2    methocarbamoL (ROBAXIN) 500 MG Tab Take 1 tablet (500 mg total) by mouth 2 (two) times daily as needed (pain). 60 tablet 0    norgestimate-ethinyl estradioL (ORTHO TRI-CYCLEN,TRI-SPRINTEC) 0.18/0.215/0.25 mg-35 mcg (28) tablet Take 1 tablet by mouth once daily. 28 tablet 11    triamcinolone acetonide 0.1% (KENALOG) 0.1 % ointment Apply topically 2 (two) times daily. 30 g 3     No current facility-administered medications on file prior to visit.        Objective:     Vitals:    01/16/25 0906   BP: 113/76   Pulse: 77   Temp: 97.7 °F (36.5 °C)   TempSrc: Oral   Weight: 64 kg (141 lb)   Height: 4' 9" (1.448 m)      BMI Readings from Last 1 Encounters:   01/16/25 30.51 kg/m²          Physical Exam  No acute distress alert and oriented   Respirations even unlabored   Abdomen is soft nontender    Postvoid residual 3 cc    Urinalysis shows moderate blood    Lab Results   Component Value Date    CREATININE 0.7 05/06/2024      Assessment:       1. Nocturnal enuresis    2. Urge incontinence of urine    3. Other microscopic hematuria        Plan:     1. Nocturnal enuresis    2. Urge incontinence of urine    3. Other microscopic hematuria       Orders Placed This Encounter    Urine Culture High Risk    Urinalysis Microscopic    mirabegron (MYRBETRIQ) 50 mg Tb24     Nocturnal enuresis.  Discuss treatment options.  Consider bed alarm.  Patient also has overactive bladder with urge incontinence.  We will start her on mirabegron 50 mg.  Discussed if this does not help with her nocturnal symptoms she may want to consider proceeding with a bed alarm to see how she " does.  She does have what appears to be chronic microscopic hematuria dating back several years.  She has had a CT scan with and without IV contrast within the last 2 years which I have independently reviewed.  There was no renal or bladder abnormality seen.  We will consider cystoscopy on return.  We will re-evaluate her in 2 months.  Recommend microscopic urinalysis and urine culture.

## 2025-01-17 ENCOUNTER — PATIENT MESSAGE (OUTPATIENT)
Dept: INTERNAL MEDICINE | Facility: CLINIC | Age: 20
End: 2025-01-17
Payer: COMMERCIAL

## 2025-01-17 ENCOUNTER — CLINICAL SUPPORT (OUTPATIENT)
Dept: REHABILITATION | Facility: HOSPITAL | Age: 20
End: 2025-01-17
Payer: COMMERCIAL

## 2025-01-17 DIAGNOSIS — G89.29 CHRONIC MIDLINE LOW BACK PAIN WITHOUT SCIATICA: Primary | ICD-10-CM

## 2025-01-17 DIAGNOSIS — M54.2 NECK PAIN: ICD-10-CM

## 2025-01-17 DIAGNOSIS — M54.50 CHRONIC MIDLINE LOW BACK PAIN WITHOUT SCIATICA: Primary | ICD-10-CM

## 2025-01-17 PROCEDURE — 97750 PHYSICAL PERFORMANCE TEST: CPT | Mod: 32,PN

## 2025-01-18 LAB — BACTERIA UR CULT: NORMAL

## 2025-01-23 NOTE — PROGRESS NOTES
[facility]  Physical Therapy Treatment Note     Name: Natalia Sue  Clinic Number: [id number]    Therapy Diagnosis:   Encounter Diagnoses   Name Primary?    Chronic midline low back pain without sciatica Yes    Neck pain        Physician: Seven Marina MD    Visit Date: 1/17/2025    Physician Orders: Evaluate and treat - Healthy back  Medical Diagnosis: Myofascial pain  Evaluation Date: 11/15/2024  Authorization Period Expiration: 12/31/2024  Reassessment Due: 1/14/2025  Plan of Care Certification Period: 11/15/2024 to 1/14/2025  Visit #/Visits authorized: 9/20   5 /10  MedX Testing:MedX testing visit 2    PTA Visit #: 0/5     Time In: 1320  Time Out: 1410  Total Billable Time: 50 minutes  INSURANCE and OUTCOMES: Program Benefit Group with Cervical Outcomes (NDI and AQoL) 2/3    Precautions: standard    Pattern of pain determined:     Subjective     Natalia Sue she  is doing ok today    Patient reports tolerating previous visit well  Patient reports their pain to be 1/10 on a 0-10 scale with 0 being no pain and 10 being the worst pain imaginable.  Pain Location: Neck and low back     Work and leisure: She works everyday  Pt goals: to decrease pain    Objective      Cervical  Isometric Testing on Med X equipment: Testing administered by PT     Test Initial Baseline Midpoint Final   Date 11/19/2024 12/20/2024     ROM  72 deg 90 deg     Max Peak Torque  110 139     Min Peak Torque  50 128     Flex/Ext Ratio         % variance normative data -61%  -23%     % change from initial test N/A visit 1  38%30     Lumbar  Isometric Testing on Med X equipment: Testing administered by PT    Test Initial Baseline Midpoint Final   Date 11/19/2024 12/16/2024    ROM  42 deg 52    Max Peak Torque  77 91    Min Peak Torque  35 49    Flex/Ext Ratio      % variance  normative data -63% -46%    % change from initial test N/A visit 1 17%        Outcomes:  Intake Score: 60%  Visit 6  Score:   Visit 10 Score: 69%  Discharge Score:  Goal Score: 69%     Treatment     Natalia received the treatments listed below:        CPT Intervention  Neck and low back pain Duration / Intensity      X TE Recumbent bike  5 minutes   X  NMR MedX lumbar extension Healthy Back     NMR MedX cervical extension Healthy Back   X  NMR MedX trunk rotation  20#x20   X NMR Cable rows  30# 2x10   X NMR Standing T Red tubing 3x10   X TE Bridges 3x20 seconds   X TE Hamstring stretch with strap 3x20 seconds   X NMR PPT 2x20   X TE Piriformis stretch 3x20 seconds   X NMR Supine cervical chin tuck      X NMR Open book     X NMR Prone cervical retraction      MT Dry Needling     X MT  Thoracic AP springing and STM                                                         PLAN             CPT Codes available for Billing:   (00) minutes of Manual therapy (MT) to improve pain and ROM.  (25) minutes of Therapeutic Exercise (TE) to develop strength, endurance, range of motion, and flexibility.  (25) minutes of Neuromuscular Re-Education (NMR)  to improve: Balance, Coordination, Kinesthetic, Sense, Proprioception, and Posture.  (00) minutes of Therapeutic Activities (TA) to improve functional performance.       Patient Education and Home Exercises     Home exercises include:  Jose Alfredo Christianson  Dynamic Hamstring stretches  Cardio program (V5): -  Lifting education (V11): -  Posture/Lumbar roll:   Fridge Magnet Discharge handout (date given): -  Equipment at home/gym membership: Yes    Education provided:   - PT role and POC  - HEP    Written Home Exercises Provided: Patient instructed to cont prior HEP.  Exercises were reviewed and Natalia was able to demonstrate them prior to the end of the session.  Ricardorachelle demonstrated good  understanding of the education provided.     See EMR under Patient Instructions for exercises provided prior visit.    Assessment   The patient is progressing with core exercises and strengthening  of postural and  cervical regions.     Patient is making good progress towards established goals.  Pt will continue to benefit from skilled outpatient physical therapy to address the deficits stated in the impairment chart, provide pt/family education and to maximize pt's level of independence in the home and community environment.     Anticipated Barriers for therapy: None  Pt's spiritual, cultural and educational needs considered and pt agreeable to plan of care and goals as stated below:     Goals:   Short term goals:  6 weeks or 10 visits   - Pt will demonstrate increased lumbar MedX ROM by at least 5 degrees from the initial ROM value with improvements noted in functional ROM and ability to perform ADLs. Appropriate and Ongoing  - Pt will demonstrate increased MedX average isometric strength value by 5% from initial test resulting in improved ability to perform bending, lifting, and carrying activities safely, confidently. Appropriate and Ongoing  - Pt will report a reduction in worst pain score by 1-2 points for improved tolerance for daily work related activity. Appropriate and Ongoing  - Pt able to perform HEP correctly with minimal cueing or supervision from therapist to encourage independent management of symptoms. Appropriate and Ongoing     Long term goals: 10 weeks or 20 visits   - Pt will demonstrate increased lumbar MedX ROM by at least 10 degrees from initial ROM value, resulting in improved ability to perform functional forward bending while standing and sitting. Appropriate and Ongoing  - Pt will demonstrate increased MedX average isometric strength value by 10% from initial test resulting in improved ability to perform bending, lifting, and carrying activities safely and confidently. Appropriate and OngAoing  - Pt to demonstrate ability to independently control and reduce their pain through posture positioning and mechanical movements throughout a typical day. Appropriate and Ongoing  - Pt will demonstrate  independence with the HEP at discharge. Appropriate and Ongoing    Plan     Continue with established Plan of Care towards established PT goals.     Therapist: Jesus Iraheta, PT  1/22/2025

## 2025-01-24 ENCOUNTER — CLINICAL SUPPORT (OUTPATIENT)
Dept: REHABILITATION | Facility: HOSPITAL | Age: 20
End: 2025-01-24
Payer: COMMERCIAL

## 2025-01-24 DIAGNOSIS — G89.29 CHRONIC MIDLINE LOW BACK PAIN WITHOUT SCIATICA: Primary | ICD-10-CM

## 2025-01-24 DIAGNOSIS — M54.50 CHRONIC MIDLINE LOW BACK PAIN WITHOUT SCIATICA: Primary | ICD-10-CM

## 2025-01-24 DIAGNOSIS — M54.2 NECK PAIN: ICD-10-CM

## 2025-01-24 PROCEDURE — 97750 PHYSICAL PERFORMANCE TEST: CPT | Mod: 32,PN

## 2025-01-26 NOTE — PROGRESS NOTES
[facility]  Physical Therapy Treatment Note     Name: Natalia Sue  Clinic Number: [id number]    Therapy Diagnosis:   Encounter Diagnoses   Name Primary?    Chronic midline low back pain without sciatica Yes    Neck pain        Physician: Seven Marina MD    Visit Date: 1/24/2025    Physician Orders: Evaluate and treat - Healthy back  Medical Diagnosis: Myofascial pain  Evaluation Date: 11/15/2024  Authorization Period Expiration: 12/31/2024  Reassessment Due: 1/14/2025  Plan of Care Certification Period: 11/15/2024 to 1/14/2025  Visit #/Visits authorized: 9/20   5 /10  MedX Testing:MedX testing visit 2    PTA Visit #: 0/5     Time In: 1320  Time Out: 1410  Total Billable Time: 50 minutes  INSURANCE and OUTCOMES: Program Benefit Group with Cervical Outcomes (NDI and AQoL) 2/3    Precautions: standard    Pattern of pain determined:     Subjective     Natalia Sue she  still feels stiff    Patient reports tolerating previous visit well  Patient reports their pain to be 1/10 on a 0-10 scale with 0 being no pain and 10 being the worst pain imaginable.  Pain Location: Neck and low back     Work and leisure: She works everyday  Pt goals: to decrease pain    Objective      Cervical  Isometric Testing on Med X equipment: Testing administered by PT     Test Initial Baseline Midpoint Final   Date 11/19/2024 12/20/2024     ROM  72 deg 90 deg     Max Peak Torque  110 139     Min Peak Torque  50 128     Flex/Ext Ratio         % variance normative data -61%  -23%     % change from initial test N/A visit 1  38%30     Lumbar  Isometric Testing on Med X equipment: Testing administered by PT    Test Initial Baseline Midpoint Final   Date 11/19/2024 12/16/2024    ROM  42 deg 52    Max Peak Torque  77 91    Min Peak Torque  35 49    Flex/Ext Ratio      % variance  normative data -63% -46%    % change from initial test N/A visit 1 17%        Outcomes:  Intake Score: 60%  Visit 6  Score:   Visit 10 Score: 69%  Discharge Score:  Goal Score: 69%     Treatment     Natalia received the treatments listed below:        CPT Intervention  Neck and low back pain Duration / Intensity      X TE Recumbent bike  5 minutes   X  NMR MedX lumbar extension Healthy Back     NMR MedX cervical extension Healthy Back   X  NMR MedX trunk rotation  20#x20   X NMR Cable rows  30# 2x10   X NMR Standing T Red tubing 3x10   X TE Bridges 3x20 seconds   X TE Hamstring stretch with strap 3x20 seconds   X NMR PPT 2x20   X TE Piriformis stretch 3x20 seconds   X NMR Supine cervical chin tuck      X NMR Open book     X NMR Prone cervical retraction      MT Dry Needling     X MT  Thoracic AP springing and STM                                                         PLAN             CPT Codes available for Billing:   (00) minutes of Manual therapy (MT) to improve pain and ROM.  (25) minutes of Therapeutic Exercise (TE) to develop strength, endurance, range of motion, and flexibility.  (25) minutes of Neuromuscular Re-Education (NMR)  to improve: Balance, Coordination, Kinesthetic, Sense, Proprioception, and Posture.  (00) minutes of Therapeutic Activities (TA) to improve functional performance.       Patient Education and Home Exercises     Home exercises include:  Jose Alfredo Christianson  Dynamic Hamstring stretches  Cardio program (V5): -  Lifting education (V11): -  Posture/Lumbar roll:   Fridge Magnet Discharge handout (date given): -  Equipment at home/gym membership: Yes    Education provided:   - PT role and POC  - HEP    Written Home Exercises Provided: Patient instructed to cont prior HEP.  Exercises were reviewed and Natalia was able to demonstrate them prior to the end of the session.  Ricardorachelle demonstrated good  understanding of the education provided.     See EMR under Patient Instructions for exercises provided prior visit.    Assessment   The patient is progressing with core exercises and strengthening  of postural and  cervical regions.   She complains of stiffness across the shoulders since high school    Patient is making good progress towards established goals.  Pt will continue to benefit from skilled outpatient physical therapy to address the deficits stated in the impairment chart, provide pt/family education and to maximize pt's level of independence in the home and community environment.     Anticipated Barriers for therapy: None  Pt's spiritual, cultural and educational needs considered and pt agreeable to plan of care and goals as stated below:     Goals:   Short term goals:  6 weeks or 10 visits   - Pt will demonstrate increased lumbar MedX ROM by at least 5 degrees from the initial ROM value with improvements noted in functional ROM and ability to perform ADLs. Appropriate and Ongoing  - Pt will demonstrate increased MedX average isometric strength value by 5% from initial test resulting in improved ability to perform bending, lifting, and carrying activities safely, confidently. Appropriate and Ongoing  - Pt will report a reduction in worst pain score by 1-2 points for improved tolerance for daily work related activity. Appropriate and Ongoing  - Pt able to perform HEP correctly with minimal cueing or supervision from therapist to encourage independent management of symptoms. Appropriate and Ongoing     Long term goals: 10 weeks or 20 visits   - Pt will demonstrate increased lumbar MedX ROM by at least 10 degrees from initial ROM value, resulting in improved ability to perform functional forward bending while standing and sitting. Appropriate and Ongoing  - Pt will demonstrate increased MedX average isometric strength value by 10% from initial test resulting in improved ability to perform bending, lifting, and carrying activities safely and confidently. Appropriate and OngAoing  - Pt to demonstrate ability to independently control and reduce their pain through posture positioning and mechanical movements throughout a  typical day. Appropriate and Ongoing  - Pt will demonstrate independence with the HEP at discharge. Appropriate and Ongoing    Plan     Continue with established Plan of Care towards established PT goals.     Therapist: Jesus Iraheta, PT  1/26/2025

## 2025-02-04 ENCOUNTER — PATIENT MESSAGE (OUTPATIENT)
Dept: OTHER | Facility: OTHER | Age: 20
End: 2025-02-04

## 2025-02-06 ENCOUNTER — LAB VISIT (OUTPATIENT)
Dept: LAB | Facility: HOSPITAL | Age: 20
End: 2025-02-06
Attending: NURSE PRACTITIONER
Payer: COMMERCIAL

## 2025-02-06 ENCOUNTER — OFFICE VISIT (OUTPATIENT)
Dept: INTERNAL MEDICINE | Facility: CLINIC | Age: 20
End: 2025-02-06
Payer: COMMERCIAL

## 2025-02-06 VITALS
TEMPERATURE: 98 F | HEIGHT: 57 IN | HEART RATE: 99 BPM | SYSTOLIC BLOOD PRESSURE: 112 MMHG | DIASTOLIC BLOOD PRESSURE: 78 MMHG | OXYGEN SATURATION: 99 % | BODY MASS INDEX: 30.45 KG/M2 | WEIGHT: 141.13 LBS

## 2025-02-06 DIAGNOSIS — R53.83 FATIGUE, UNSPECIFIED TYPE: ICD-10-CM

## 2025-02-06 DIAGNOSIS — L21.9 SEBORRHEIC DERMATITIS: Chronic | ICD-10-CM

## 2025-02-06 DIAGNOSIS — E66.811 CLASS 1 OBESITY DUE TO EXCESS CALORIES WITH SERIOUS COMORBIDITY AND BODY MASS INDEX (BMI) OF 30.0 TO 30.9 IN ADULT: Chronic | ICD-10-CM

## 2025-02-06 DIAGNOSIS — D64.9 ANEMIA, UNSPECIFIED TYPE: Chronic | ICD-10-CM

## 2025-02-06 DIAGNOSIS — R49.0 HOARSENESS OF VOICE: ICD-10-CM

## 2025-02-06 DIAGNOSIS — J30.89 ALLERGIC RHINITIS DUE TO OTHER ALLERGIC TRIGGER, UNSPECIFIED SEASONALITY: ICD-10-CM

## 2025-02-06 DIAGNOSIS — N39.44 NOCTURNAL ENURESIS: Chronic | ICD-10-CM

## 2025-02-06 DIAGNOSIS — E66.09 CLASS 1 OBESITY DUE TO EXCESS CALORIES WITH SERIOUS COMORBIDITY AND BODY MASS INDEX (BMI) OF 30.0 TO 30.9 IN ADULT: Chronic | ICD-10-CM

## 2025-02-06 DIAGNOSIS — R06.7 SNEEZING: ICD-10-CM

## 2025-02-06 DIAGNOSIS — J06.9 URI WITH COUGH AND CONGESTION: Primary | ICD-10-CM

## 2025-02-06 DIAGNOSIS — R31.29 MICROSCOPIC HEMATURIA: Chronic | ICD-10-CM

## 2025-02-06 DIAGNOSIS — L29.9 ITCHING OF EAR: ICD-10-CM

## 2025-02-06 DIAGNOSIS — R73.03 PREDIABETES: Chronic | ICD-10-CM

## 2025-02-06 DIAGNOSIS — J34.89 RHINORRHEA: ICD-10-CM

## 2025-02-06 LAB
CTP QC/QA: YES
CTP QC/QA: YES
FOLATE SERPL-MCNC: 10 NG/ML (ref 4–24)
POC MOLECULAR INFLUENZA A AGN: NEGATIVE
POC MOLECULAR INFLUENZA B AGN: NEGATIVE
SARS-COV-2 RDRP RESP QL NAA+PROBE: NEGATIVE

## 2025-02-06 PROCEDURE — 3008F BODY MASS INDEX DOCD: CPT | Mod: CPTII,S$GLB,, | Performed by: NURSE PRACTITIONER

## 2025-02-06 PROCEDURE — G2211 COMPLEX E/M VISIT ADD ON: HCPCS | Mod: S$GLB,,, | Performed by: NURSE PRACTITIONER

## 2025-02-06 PROCEDURE — 3074F SYST BP LT 130 MM HG: CPT | Mod: CPTII,S$GLB,, | Performed by: NURSE PRACTITIONER

## 2025-02-06 PROCEDURE — 82746 ASSAY OF FOLIC ACID SERUM: CPT | Performed by: NURSE PRACTITIONER

## 2025-02-06 PROCEDURE — 99214 OFFICE O/P EST MOD 30 MIN: CPT | Mod: S$GLB,,, | Performed by: NURSE PRACTITIONER

## 2025-02-06 PROCEDURE — 99999 PR PBB SHADOW E&M-EST. PATIENT-LVL IV: CPT | Mod: PBBFAC,,, | Performed by: NURSE PRACTITIONER

## 2025-02-06 PROCEDURE — 3078F DIAST BP <80 MM HG: CPT | Mod: CPTII,S$GLB,, | Performed by: NURSE PRACTITIONER

## 2025-02-06 PROCEDURE — 36415 COLL VENOUS BLD VENIPUNCTURE: CPT | Performed by: NURSE PRACTITIONER

## 2025-02-06 PROCEDURE — 87635 SARS-COV-2 COVID-19 AMP PRB: CPT | Mod: QW,S$GLB,, | Performed by: NURSE PRACTITIONER

## 2025-02-06 PROCEDURE — 1160F RVW MEDS BY RX/DR IN RCRD: CPT | Mod: CPTII,S$GLB,, | Performed by: NURSE PRACTITIONER

## 2025-02-06 PROCEDURE — 1159F MED LIST DOCD IN RCRD: CPT | Mod: CPTII,S$GLB,, | Performed by: NURSE PRACTITIONER

## 2025-02-06 PROCEDURE — 87502 INFLUENZA DNA AMP PROBE: CPT | Mod: QW,S$GLB,, | Performed by: NURSE PRACTITIONER

## 2025-02-06 RX ORDER — FERROUS SULFATE 325(65) MG
325 TABLET ORAL
Qty: 48 TABLET | Refills: 3 | Status: SHIPPED | OUTPATIENT
Start: 2025-02-07

## 2025-02-06 RX ORDER — LORATADINE 10 MG/1
10 TABLET ORAL DAILY
Qty: 90 TABLET | Refills: 3 | Status: SHIPPED | OUTPATIENT
Start: 2025-02-06 | End: 2026-02-06

## 2025-02-06 RX ORDER — METHYLPREDNISOLONE 4 MG/1
TABLET ORAL
Qty: 21 EACH | Refills: 0 | Status: SHIPPED | OUTPATIENT
Start: 2025-02-06 | End: 2025-02-17

## 2025-02-06 NOTE — PROGRESS NOTES
Subjective:      Patient ID: Natalia Sue is a 20 y.o. female.    Chief Complaint: Physical Exam    History of Present Illness    CHIEF COMPLAINT:  Natalia presents today for allergy symptoms and fatigue.    ALLERGIC RHINITIS:  She reports nasal congestion, itchy nose, frequent sneezing, itchy left ear, and itchy throat. Her voice is occasionally affected by these symptoms. She has taken Benadryl without relief and was previously prescribed loratadine.    FATIGUE:  She reports fatigue with sudden onset of sleepiness throughout the day despite adequate sleep.    DIABETES:  She has diabetes with recent improvement in A1C from 6.0 to 5.8 and average glucose decrease from 126 to 120.    HEMATOLOGIC:  She has a history of anemia which is improving. Recent labs show hemoglobin and hematocrit have returned to normal limits. She started and discontinued iron supplements that were available at home.    GENITOURINARY:  She has a history of hematuria with pending cystoscopy recommendation. She experiences nocturia for which medication has been prescribed but not yet obtained.    GERD:  She has reflux which is improving without medication, though medication has been prescribed but not obtained.    GYNECOLOGIC:  She reports menstrual cycles lasting one week with heavy bleeding at the beginning of her cycle.    SOCIAL HISTORY:  She reports daily alcohol consumption when available, including two glasses of red wine the night before the visit.      ROS:  General: -fever, -chills, +fatigue, -weight gain, -weight loss  Eyes: -vision changes, -redness, -discharge  ENT: -ear pain, +nasal congestion, -sore throat  Cardiovascular: -chest pain, -palpitations, -lower extremity edema  Respiratory: -cough, -shortness of breath  Gastrointestinal: -abdominal pain, -nausea, -vomiting, -diarrhea, -constipation, -blood in stool, +heartburn  Genitourinary: -dysuria, -hematuria, -frequency, +nocturia  Musculoskeletal: -joint pain, -muscle  pain  Skin: -rash, -lesion  Neurological: -headache, -dizziness, -numbness, -tingling  Psychiatric: -anxiety, -depression, -sleep difficulty  Allergic: +frequent sneezing          Patient Active Problem List   Diagnosis    Chronic midline low back pain without sciatica    Neck pain    Microscopic hematuria    Recurrent mild major depressive disorder with anxiety    Granulocytosis    Class 1 obesity due to excess calories with serious comorbidity and body mass index (BMI) of 30.0 to 30.9 in adult    Prediabetes         Current Outpatient Medications:     buPROPion (WELLBUTRIN XL) 150 MG TB24 tablet, Take 1 tablet (150 mg total) by mouth every morning., Disp: 30 tablet, Rfl: 2    ciclopirox (LOPROX) 0.77 % Susp, Apply topically 2 (two) times daily., Disp: 30 mL, Rfl: 1    hydrocortisone 2.5 % cream, Apply SPARINGLY to areas of skin irritation twice daily as needed (Use only as needed, max use 5 days.), Disp: 20 g, Rfl: 0    ketoconazole (NIZORAL) 2 % shampoo, Apply to a clean scalp and skin under facial hair twice weekly for 4 weeks., Disp: 240 mL, Rfl: 2    methocarbamoL (ROBAXIN) 500 MG Tab, Take 1 tablet (500 mg total) by mouth 2 (two) times daily as needed (pain)., Disp: 60 tablet, Rfl: 0    mirabegron (MYRBETRIQ) 50 mg Tb24, Take 1 tablet (50 mg total) by mouth Daily., Disp: 30 tablet, Rfl: 5    norgestimate-ethinyl estradioL (ORTHO TRI-CYCLEN,TRI-SPRINTEC) 0.18/0.215/0.25 mg-35 mcg (28) tablet, Take 1 tablet by mouth once daily., Disp: 28 tablet, Rfl: 11    triamcinolone acetonide 0.1% (KENALOG) 0.1 % ointment, Apply topically 2 (two) times daily., Disp: 30 g, Rfl: 3    [START ON 2/7/2025] ferrous sulfate (FEOSOL) 325 mg (65 mg iron) Tab tablet, Take 1 tablet (325 mg total) by mouth every Mon, Wed, Fri., Disp: 48 tablet, Rfl: 3    loratadine (CLARITIN) 10 mg tablet, Take 1 tablet (10 mg total) by mouth once daily., Disp: 90 tablet, Rfl: 3    methylPREDNISolone (MEDROL DOSEPACK) 4 mg tablet, use as directed,  "Disp: 21 each, Rfl: 0      Objective:   /78 (BP Location: Right arm, Patient Position: Sitting)   Pulse 99   Temp 98 °F (36.7 °C) (Tympanic)   Ht 4' 9" (1.448 m)   Wt 64 kg (141 lb 1.5 oz)   LMP 01/09/2025 (Approximate)   SpO2 99%   BMI 30.53 kg/m²     Physical Exam             Physical Exam  Vitals and nursing note reviewed.   Constitutional:       General: She is awake. She is not in acute distress.     Appearance: Normal appearance. She is well-developed and well-groomed. She is not ill-appearing, toxic-appearing or diaphoretic.   HENT:      Head: Normocephalic and atraumatic.      Right Ear: Tympanic membrane, ear canal and external ear normal.      Left Ear: Tympanic membrane, ear canal and external ear normal.      Nose: Congestion and rhinorrhea present. Rhinorrhea is clear.      Right Turbinates: Swollen.      Left Turbinates: Swollen.      Mouth/Throat:      Pharynx: No oropharyngeal exudate or posterior oropharyngeal erythema.   Eyes:      Conjunctiva/sclera: Conjunctivae normal.      Pupils: Pupils are equal, round, and reactive to light.   Cardiovascular:      Rate and Rhythm: Normal rate and regular rhythm.      Heart sounds: Normal heart sounds. No murmur heard.  Pulmonary:      Effort: Pulmonary effort is normal. No tachypnea, bradypnea, accessory muscle usage, prolonged expiration, respiratory distress or retractions.      Breath sounds: Normal breath sounds. No stridor, decreased air movement or transmitted upper airway sounds. No decreased breath sounds, wheezing, rhonchi or rales.   Abdominal:      General: Abdomen is flat. Bowel sounds are normal.      Palpations: Abdomen is soft.   Musculoskeletal:         General: No swelling, tenderness, deformity or signs of injury.      Cervical back: Normal range of motion and neck supple.      Right lower leg: No edema.      Left lower leg: No edema.   Skin:     General: Skin is warm and dry.      Capillary Refill: Capillary refill takes less " than 2 seconds.   Neurological:      General: No focal deficit present.      Mental Status: She is alert and oriented to person, place, and time.      Gait: Gait normal.   Psychiatric:         Attention and Perception: Attention and perception normal.         Mood and Affect: Mood and affect normal.         Speech: Speech normal.         Behavior: Behavior normal. Behavior is cooperative.         Cognition and Memory: Cognition normal.          Assessment/Plan :   1. URI with cough and congestion  -     methylPREDNISolone (MEDROL DOSEPACK) 4 mg tablet; use as directed  Dispense: 21 each; Refill: 0    2. Allergic rhinitis due to other allergic trigger, unspecified seasonality  -     loratadine (CLARITIN) 10 mg tablet; Take 1 tablet (10 mg total) by mouth once daily.  Dispense: 90 tablet; Refill: 3  -     methylPREDNISolone (MEDROL DOSEPACK) 4 mg tablet; use as directed  Dispense: 21 each; Refill: 0    3. Prediabetes    4. Anemia, unspecified type  -     FOLATE; Future; Expected date: 02/06/2025  -     ferrous sulfate (FEOSOL) 325 mg (65 mg iron) Tab tablet; Take 1 tablet (325 mg total) by mouth every Mon, Wed, Fri.  Dispense: 48 tablet; Refill: 3    5. Class 1 obesity due to excess calories with serious comorbidity and body mass index (BMI) of 30.0 to 30.9 in adult    6. Sneezing  -     POCT COVID-19 Rapid Screening    7. Rhinorrhea  -     POCT COVID-19 Rapid Screening    8. Itching of ear    9. Hoarseness of voice    10. Fatigue, unspecified type  -     POCT Influenza A/B Molecular  -     FOLATE; Future; Expected date: 02/06/2025    11. Seborrheic dermatitis    12. Nocturnal enuresis    13. Microscopic hematuria         Assessment & Plan    Assessed patient for COVID-19 and influenza; both tests negative  Diagnosed allergic rhinitis and upper respiratory infection based on symptoms  Evaluated anemia as potential cause of fatigue, noting improvement in hemoglobin and hematocrit levels  Determined need for folate  level testing to further investigate anemia etiology  Noted improvement in average glucose levels (from 126 to 120) and HbA1c (from 6.0 to 5.8)  Reviewed Dr. Tellez's recommendation for weight loss program through digital medicine [facility]    ALLERGIC RHINITIS:  - Evaluated the patient's symptoms of itchy nose, frequent sneezing, and itchy ears.  - Observed congestion and inflamed throat during exam.  - Explained the difference between a scratchy throat (allergic rhinitis) and a painful throat (strep throat).  - Noted that the patient's current use of Benadryl and extra strength medication is ineffective.  - Prescribed loratadine 10 mg daily for allergies.  - Initiated a steroid Dosepak for allergic rhinitis.  - Advised the patient on the importance of taking allergy medicine regularly.    UPPER RESPIRATORY INFECTION:  - Assessed the patient's symptoms of congestion, scratchy throat, and occasional cough.  - Observed congestion and inflamed throat during exam.  - Diagnosed the patient with an upper respiratory infection.  - Initiated a steroid Dosepak for the upper respiratory infection.  - Performed COVID-19 and influenza tests.  - Recommend increased fluid intake, particularly water.    ANEMIA:  - Noted improvement in the patient's hemoglobin and hematocrit levels.  - Discussed the patient's report of heavy menstrual bleeding for a week.  - Observed that the reticulocyte count is normal despite improved hemoglobin and hematocrit levels.  - Explained that iron supplementation may help with fatigue if anemia is the underlying cause.  - Advised continuation of iron supplements for anemia.  - Ordered a folate level test.  - Considered hematology referral if the condition worsens.    DIABETES:  - Noted improvement in the patient's average glucose from 126 to 120.  - Observed improvement in A1C from 6.0 to 5.8.  - Acknowledged the patient's progress while emphasizing the continued diabetic status.  - Encouraged the  patient to continue eating healthier, as evidenced by improved glucose levels.    NOCTURIA:  - Prescribed medication for nocturia.  - Noted that the patient has not picked up the prescribed medication yet.    REFLUX:  - Prescribed medication for reflux.  - Noted that the patient has not picked up the prescribed medication yet.    HEMATURIA:  - Suggested possible cystoscopy for further evaluation of blood in urine.    ALCOHOL CONSUMPTION:  - Discussed the patient's report of daily alcohol consumption when available.  - Explained the potential link between alcohol consumption and anemia.  - Recommend reducing alcohol consumption due to potential impact on anemia.    WEIGHT MANAGEMENT:  - Noted the patient's high BMI and eligibility for a weight loss program.  - Recommend signing up for a weight loss program through a digital medicine [facility].    FOLLOW UP:  - Contact the office if symptoms worsen or new symptoms develop.  - Follow up with Dr. Tellez this month to discuss test results.          No follow-ups on file.    This note was generated with the assistance of ambient listening technology. Verbal consent was obtained by the patient and accompanying visitor(s) for the recording of patient appointment to facilitate this note. I attest to having reviewed and edited the generated note for accuracy, though some syntax or spelling errors may persist. Please contact the author of this note for any clarification.

## 2025-02-14 ENCOUNTER — PATIENT OUTREACH (OUTPATIENT)
Dept: OTHER | Facility: OTHER | Age: 20
End: 2025-02-14
Payer: COMMERCIAL

## 2025-02-14 NOTE — LETTER
February 14, 2025              CHERI Tellez MD  [address]  [place] [address]       You previously referred your patient Natalia Sue to the [facility]. This message is to inform you that they have not completed the program and were removed after 9 weeks because of non compliance.     Thank you,  [facility] Team

## 2025-02-14 NOTE — PROGRESS NOTES
[facility]: Patient Outreach    Patient inactive within [facility] program for greater than 30 days. Closing program episode. Sent ordering provider message.

## 2025-02-17 ENCOUNTER — PATIENT MESSAGE (OUTPATIENT)
Dept: INTERNAL MEDICINE | Facility: CLINIC | Age: 20
End: 2025-02-17

## 2025-02-17 ENCOUNTER — OFFICE VISIT (OUTPATIENT)
Dept: INTERNAL MEDICINE | Facility: CLINIC | Age: 20
End: 2025-02-17
Payer: COMMERCIAL

## 2025-02-17 VITALS
HEIGHT: 57 IN | TEMPERATURE: 97 F | BODY MASS INDEX: 31.11 KG/M2 | WEIGHT: 144.19 LBS | HEART RATE: 107 BPM | RESPIRATION RATE: 18 BRPM | DIASTOLIC BLOOD PRESSURE: 66 MMHG | SYSTOLIC BLOOD PRESSURE: 122 MMHG | OXYGEN SATURATION: 99 %

## 2025-02-17 DIAGNOSIS — F41.9 RECURRENT MILD MAJOR DEPRESSIVE DISORDER WITH ANXIETY: Chronic | ICD-10-CM

## 2025-02-17 DIAGNOSIS — R73.03 PREDIABETES: Chronic | ICD-10-CM

## 2025-02-17 DIAGNOSIS — J30.2 SEASONAL ALLERGIC RHINITIS, UNSPECIFIED TRIGGER: Primary | Chronic | ICD-10-CM

## 2025-02-17 DIAGNOSIS — J00 HEAD COLD: ICD-10-CM

## 2025-02-17 DIAGNOSIS — E66.09 CLASS 1 OBESITY DUE TO EXCESS CALORIES WITH SERIOUS COMORBIDITY AND BODY MASS INDEX (BMI) OF 31.0 TO 31.9 IN ADULT: Chronic | ICD-10-CM

## 2025-02-17 DIAGNOSIS — F33.0 RECURRENT MILD MAJOR DEPRESSIVE DISORDER WITH ANXIETY: Chronic | ICD-10-CM

## 2025-02-17 DIAGNOSIS — E66.811 CLASS 1 OBESITY DUE TO EXCESS CALORIES WITH SERIOUS COMORBIDITY AND BODY MASS INDEX (BMI) OF 31.0 TO 31.9 IN ADULT: Chronic | ICD-10-CM

## 2025-02-17 PROCEDURE — 99999 PR PBB SHADOW E&M-EST. PATIENT-LVL IV: CPT | Mod: PBBFAC,,, | Performed by: FAMILY MEDICINE

## 2025-02-17 PROCEDURE — 3008F BODY MASS INDEX DOCD: CPT | Mod: CPTII,S$GLB,, | Performed by: FAMILY MEDICINE

## 2025-02-17 PROCEDURE — 99214 OFFICE O/P EST MOD 30 MIN: CPT | Mod: S$GLB,,, | Performed by: FAMILY MEDICINE

## 2025-02-17 PROCEDURE — 3074F SYST BP LT 130 MM HG: CPT | Mod: CPTII,S$GLB,, | Performed by: FAMILY MEDICINE

## 2025-02-17 PROCEDURE — G2211 COMPLEX E/M VISIT ADD ON: HCPCS | Mod: S$GLB,,, | Performed by: FAMILY MEDICINE

## 2025-02-17 PROCEDURE — 3078F DIAST BP <80 MM HG: CPT | Mod: CPTII,S$GLB,, | Performed by: FAMILY MEDICINE

## 2025-02-17 RX ORDER — AZELASTINE 1 MG/ML
2 SPRAY, METERED NASAL 2 TIMES DAILY
Qty: 30 ML | Refills: 1 | Status: SHIPPED | OUTPATIENT
Start: 2025-02-17 | End: 2026-02-17

## 2025-02-17 RX ORDER — BUPROPION HYDROCHLORIDE 150 MG/1
150 TABLET ORAL EVERY MORNING
Qty: 90 TABLET | Refills: 3 | Status: SHIPPED | OUTPATIENT
Start: 2025-02-17

## 2025-02-17 RX ORDER — FLUTICASONE PROPIONATE 50 MCG
2 SPRAY, SUSPENSION (ML) NASAL DAILY
Qty: 16 G | Refills: 11 | Status: SHIPPED | OUTPATIENT
Start: 2025-02-17

## 2025-02-17 RX ORDER — MONTELUKAST SODIUM 10 MG/1
10 TABLET ORAL NIGHTLY
Qty: 30 TABLET | Refills: 1 | Status: SHIPPED | OUTPATIENT
Start: 2025-02-17 | End: 2027-11-13

## 2025-02-17 RX ORDER — OXYMETAZOLINE HCL 0.05 %
SPRAY, NON-AEROSOL (ML) NASAL
Qty: 15 ML | Refills: 0 | Status: SHIPPED | OUTPATIENT
Start: 2025-02-17

## 2025-02-17 NOTE — PROGRESS NOTES
OFFICE VISIT 2/17/25  8:20 AM CST    HISTORY:  Class 1 obesity: BMI is 31.2. The patient is encouraged to continue lifestyle modifications, including dietary changes and increased physical activity. She is advised to enroll in the [facility] for structured support. Regular follow-up is recommended to monitor progress.    Prediabetes: A1C was 5.8 on the last check, down from 6.0. Lifestyle modifications, including dietary control and regular exercise, were reinforced to prevent progression to diabetes. Continued monitoring of A1C and glucose levels is recommended.    Recurrent mild major depressive disorder with anxiety: The patient reports that her current bupropion regimen remains effective and well-tolerated. No dose adjustments are needed at this time. Continued monitoring of mood symptoms is recommended.    Seasonal allergic rhinitis: Symptoms include nasal congestion, rhinorrhea, and sneezing, worsened by seasonal changes. Current treatment includes Flonase, montelukast, and azelastine, but symptoms persist. Oxymetazoline was added for short-term relief. If symptoms persist without improvement over the next 2-3 months, referral to ENT will be considered.    Head cold: The patient reports persistent nasal congestion and sneezing consistent with a lingering viral upper respiratory infection. She denies fever and ear pain but notes throat itching after completing a steroid pack. Supportive care includes continued nasal sprays and symptomatic treatment. She is advised to monitor symptoms and contact the office if they do not improve gradually over the next few weeks.    Problem List Items Addressed This Visit       Class 1 obesity due to excess calories with serious comorbidity and body mass index (BMI) of 31.0 to 31.9 in adult (Chronic)    Prediabetes (Chronic)    Recurrent mild major depressive disorder with anxiety (Chronic)    Relevant Medications    buPROPion (WELLBUTRIN XL)  "150 MG TB24 tablet    Seasonal allergic rhinitis - Primary (Chronic)    Relevant Medications    fluticasone propionate (FLONASE) 50 mcg/actuation nasal spray    montelukast (SINGULAIR) 10 mg tablet    azelastine (ASTELIN) 137 mcg (0.1 %) nasal spray     Other Visit Diagnoses         Head cold        Relevant Medications    oxymetazoline (AFRIN, OXYMETAZOLINE,) 0.05 % nasal spray          Today's visit involved the intricate management of episodic problem(s) and the ongoing care for the patient's serious or complex condition(s) listed above, reflecting the inherent complexity of providing longitudinal, comprehensive evaluation and management as the central hub for the patient's primary care services.  Vitals:    02/17/25 0847   BP: 122/66   BP Location: Left arm   Patient Position: Sitting   Pulse: 107   Resp: 18   Temp: 96.7 °F (35.9 °C)   TempSrc: Tympanic   SpO2: 99%   Weight: 65.4 kg (144 lb 2.9 oz)   Height: 4' 9" (1.448 m)     PHYSICAL EXAM:  GENERAL APPEARANCE:  - Alert and grossly oriented.  - No apparent distress, breathing comfortably.  EYES:  - Sclera without icterus.  EARS, NOSE, AND THROAT:  - No visible abnormalities.  RESPIRATORY:  - No respiratory distress.  - No audible wheezing or cough.  PSYCHIATRIC:  - Mood and affect appropriate; behavior cooperative.  Documentation entered by me for this encounter may have been done in part using speech-recognition technology. Although I have made an effort to ensure accuracy, "sound like" errors may exist and should be interpreted in context.   "

## 2025-02-26 ENCOUNTER — OFFICE VISIT (OUTPATIENT)
Dept: OBSTETRICS AND GYNECOLOGY | Facility: CLINIC | Age: 20
End: 2025-02-26
Payer: COMMERCIAL

## 2025-02-26 VITALS
DIASTOLIC BLOOD PRESSURE: 82 MMHG | SYSTOLIC BLOOD PRESSURE: 130 MMHG | WEIGHT: 142.44 LBS | BODY MASS INDEX: 30.73 KG/M2 | HEIGHT: 57 IN

## 2025-02-26 DIAGNOSIS — Z12.4 CERVICAL CANCER SCREENING: ICD-10-CM

## 2025-02-26 DIAGNOSIS — Z30.42 ENCOUNTER FOR DEPO-PROVERA CONTRACEPTION: ICD-10-CM

## 2025-02-26 DIAGNOSIS — Z01.419 ENCOUNTER FOR ANNUAL ROUTINE GYNECOLOGICAL EXAMINATION: Primary | ICD-10-CM

## 2025-02-26 LAB
B-HCG UR QL: NEGATIVE
CTP QC/QA: YES

## 2025-02-26 PROCEDURE — 3075F SYST BP GE 130 - 139MM HG: CPT | Mod: CPTII,S$GLB,,

## 2025-02-26 PROCEDURE — 3079F DIAST BP 80-89 MM HG: CPT | Mod: CPTII,S$GLB,,

## 2025-02-26 PROCEDURE — 81025 URINE PREGNANCY TEST: CPT | Mod: S$GLB,,,

## 2025-02-26 PROCEDURE — 1159F MED LIST DOCD IN RCRD: CPT | Mod: CPTII,S$GLB,,

## 2025-02-26 PROCEDURE — 99999 PR PBB SHADOW E&M-EST. PATIENT-LVL III: CPT | Mod: PBBFAC,,,

## 2025-02-26 PROCEDURE — 88175 CYTOPATH C/V AUTO FLUID REDO: CPT

## 2025-02-26 PROCEDURE — 96372 THER/PROPH/DIAG INJ SC/IM: CPT | Mod: S$GLB,,,

## 2025-02-26 RX ORDER — MEDROXYPROGESTERONE ACETATE 150 MG/ML
150 INJECTION, SUSPENSION INTRAMUSCULAR
Status: SHIPPED | OUTPATIENT
Start: 2025-02-26 | End: 2026-05-22

## 2025-02-26 RX ADMIN — MEDROXYPROGESTERONE ACETATE 150 MG: 150 INJECTION, SUSPENSION INTRAMUSCULAR at 08:02

## 2025-02-26 NOTE — PROGRESS NOTES
Subjective:       Patient ID: Natalia Sue is a 20 y.o. female.    Chief Complaint:  Well Woman      History of Present Illness  HPI  Annual Exam-Premenopausal  Patient presents for annual exam. The patient has no complaints today. The patient is sexually active, MM with female partner. GYN screening history: last pap: patient has never had a pap test. The patient wears seatbelts: yes. The patient participates in regular exercise:  occasionally . Has the patient ever been transfused or tattooed?: yes, 5 tattoos.   Currently on OCP but having trouble remembering to take everyday, would like to switch to Depo Provera.         GYN & OB History  Patient's last menstrual period was 2025 (approximate).   Date of Last Pap: 2025    OB History    Para Term  AB Living   0 0 0 0 0 0   SAB IAB Ectopic Multiple Live Births   0 0 0 0 0       Review of Systems  Review of Systems   Constitutional: Negative.    HENT: Negative.     Eyes: Negative.    Respiratory: Negative.     Cardiovascular: Negative.    Gastrointestinal: Negative.    Genitourinary: Negative.    Musculoskeletal: Negative.    Integumentary:  Negative.   Neurological: Negative.    Hematological: Negative.    Psychiatric/Behavioral: Negative.     All other systems reviewed and are negative.  Breast: negative.            Objective:      Physical Exam:   Constitutional: She is oriented to person, place, and time. She appears well-developed and well-nourished.    HENT:   Head: Normocephalic and atraumatic.   Nose: Nose normal.    Eyes: Pupils are equal, round, and reactive to light. Conjunctivae and EOM are normal.     Cardiovascular:  Normal rate and regular rhythm.             Pulmonary/Chest: Effort normal. She has no decreased breath sounds. She has no rhonchi. Right breast exhibits no inverted nipple, no mass, no nipple discharge, no tenderness and no bleeding. Left breast exhibits no inverted nipple, no mass, no nipple discharge, no  tenderness and no bleeding. Breasts are symmetrical.        Abdominal: Soft. There is no abdominal tenderness.     Genitourinary:    Inguinal canal, vagina, uterus, right adnexa and left adnexa normal.      Pelvic exam was performed with patient supine.   The external female genitalia was normal.   No external genitalia lesions identified,Genitalia hair distrobution normal .     Labial bartholins normal.Cervix is normal. Right adnexum displays no mass and no tenderness. Left adnexum displays no mass and no tenderness. No vaginal discharge, tenderness or bleeding in the vagina. Vagina was moist.Cervix exhibits no motion tenderness, no discharge and no tenderness.    pap smear completedUerus contour normal  Uterus is not tender. Uterus size: 8 cm.Normal urethral meatus.          Musculoskeletal: Normal range of motion and moves all extremeties.       Neurological: She is alert and oriented to person, place, and time.    Skin: Skin is warm and dry.    Psychiatric: She has a normal mood and affect. Her speech is normal and behavior is normal. Mood, judgment and thought content normal.             Assessment:        1. Encounter for annual routine gynecological examination    2. Cervical cancer screening    3. Encounter for Depo-Provera contraception               Plan:   Continue annual well woman exam.  First pap collected. Reviewed updated recommendations for pap smears (every 3 years) in low risk patients. Recommend annual pelvic exams. Reviewed recommendations for annual CBE.and STI screening.   Safe sex practices and vaginosis prevention discussed.  Bleeding irregularities caused by depo discussed with pt and that sometimes it can take up to the fourth injection to know how she will respond to depo.  Black box warning of Depo discussed with pt. Recommended supplementation with calcium, vitamin D, and weight bearing exercises daily.    Diagnosis and orders this visit:  Encounter for annual routine gynecological  examination    Cervical cancer screening  -     Liquid-Based Pap Smear, Screening    Encounter for Depo-Provera contraception  -     POCT urine pregnancy  -     medroxyPROGESTERone (DEPO-PROVERA) injection 150 mg         Mahi Harrison NP

## 2025-02-26 NOTE — PROGRESS NOTES
Identified pt using two pt identifiers. Allergies and medications verified with pt. Depo provera injection given to pt in right ventrogluteal. Pt tolerated well and was advised of 15 minute wait time. Pt verbalized understanding. Next appt scheduled and pt is aware of appt date, time and location.

## 2025-02-27 LAB
CLINICAL INFO: NORMAL
DATE OF PREVIOUS PAP: NORMAL
DATE PREVIOUS BX: NO
LMP START DATE: NORMAL
SPECIMEN SOURCE CVX/VAG CYTO: NORMAL

## 2025-03-05 ENCOUNTER — RESULTS FOLLOW-UP (OUTPATIENT)
Dept: OBSTETRICS AND GYNECOLOGY | Facility: CLINIC | Age: 20
End: 2025-03-05

## 2025-03-06 ENCOUNTER — OFFICE VISIT (OUTPATIENT)
Dept: GASTROENTEROLOGY | Facility: CLINIC | Age: 20
End: 2025-03-06
Payer: COMMERCIAL

## 2025-03-06 VITALS
HEIGHT: 57 IN | DIASTOLIC BLOOD PRESSURE: 86 MMHG | SYSTOLIC BLOOD PRESSURE: 130 MMHG | BODY MASS INDEX: 30.86 KG/M2 | WEIGHT: 143.06 LBS | HEART RATE: 87 BPM

## 2025-03-06 DIAGNOSIS — K59.04 CHRONIC IDIOPATHIC CONSTIPATION: Primary | ICD-10-CM

## 2025-03-06 DIAGNOSIS — R14.0 ABDOMINAL BLOATING: ICD-10-CM

## 2025-03-06 DIAGNOSIS — R10.9 ABDOMINAL PAIN, UNSPECIFIED ABDOMINAL LOCATION: ICD-10-CM

## 2025-03-06 PROCEDURE — 99204 OFFICE O/P NEW MOD 45 MIN: CPT | Mod: S$GLB,,, | Performed by: INTERNAL MEDICINE

## 2025-03-06 PROCEDURE — 3008F BODY MASS INDEX DOCD: CPT | Mod: CPTII,S$GLB,, | Performed by: INTERNAL MEDICINE

## 2025-03-06 PROCEDURE — 3079F DIAST BP 80-89 MM HG: CPT | Mod: CPTII,S$GLB,, | Performed by: INTERNAL MEDICINE

## 2025-03-06 PROCEDURE — 1159F MED LIST DOCD IN RCRD: CPT | Mod: CPTII,S$GLB,, | Performed by: INTERNAL MEDICINE

## 2025-03-06 PROCEDURE — 99999 PR PBB SHADOW E&M-EST. PATIENT-LVL V: CPT | Mod: PBBFAC,,, | Performed by: INTERNAL MEDICINE

## 2025-03-06 PROCEDURE — 3075F SYST BP GE 130 - 139MM HG: CPT | Mod: CPTII,S$GLB,, | Performed by: INTERNAL MEDICINE

## 2025-03-06 RX ORDER — SODIUM, POTASSIUM,MAG SULFATES 17.5-3.13G
1 SOLUTION, RECONSTITUTED, ORAL ORAL DAILY
Qty: 1 EACH | Refills: 0 | Status: SHIPPED | OUTPATIENT
Start: 2025-03-06 | End: 2025-03-08

## 2025-03-06 NOTE — PROGRESS NOTES
[facility]  Gastroenterology    PCP: CHERI Tellez MD    3/6/25    Reason for Visit: Constipation, Abdominal Pain    Subjective:   Natalia Sue is a 20 y.o. female here for evaluation of constipation and abdominal pain. Patient reports issues with abdominal cramping pains and constipation. She was having a BM just once a week at times. Went to see her PCP and was told to start onto fiber. She increased fiber in diet and tried Metamucil. Now has been going once or twice a day but has significant abdominal cramping associated with it. Stools will vary, sometimes are solid and other times are watery. Has lots of abdominal bloating and gas with abdominal distention. She can get bloated even after eating smaller meals. No prior colonoscopy. Not currently taking anything for the constipation.       Past Medical History:   Diagnosis Date    Bronchitis     Low iron     Pre-diabetes        Past Surgical History:   Procedure Laterality Date    WISDOM TOOTH EXTRACTION         Medications Ordered Prior to Encounter[1]    Review of patient's allergies indicates:   Allergen Reactions    Ibuprofen Swelling    Naproxen Swelling     Eye swelling    Nsaids (non-steroidal anti-inflammatory drug) Swelling    Aspirin-acetaminophen-caffeine     Excedrin ib      Eye swelling    Penicillins Hives, Itching, Rash and Swelling       Social History[2]    Family History   Problem Relation Name Age of Onset    Diabetes Mother      Hypertension Mother      Hyperlipidemia Mother      Multiple sclerosis Mother      Diabetes Father      Malignant hypertension Father      Hyperlipidemia Father      Diabetes Maternal Grandmother      No Known Problems Maternal Grandfather      No Known Problems Paternal Grandmother         Review of Systems   Constitutional:  Negative for appetite change, fever and unexpected weight change.   HENT:  Negative for postnasal drip, rhinorrhea, sneezing, sore throat and trouble swallowing.    Eyes:   Negative for visual disturbance.   Respiratory:  Negative for cough, shortness of breath and wheezing.    Cardiovascular:  Negative for chest pain, palpitations and leg swelling.   Gastrointestinal:  Positive for abdominal distention, abdominal pain, constipation and diarrhea. Negative for blood in stool, nausea and vomiting.   Genitourinary:  Negative for dysuria.   Musculoskeletal:  Negative for arthralgias, joint swelling and myalgias.   Skin:  Negative for color change, pallor and rash.   Neurological:  Negative for weakness, light-headedness, numbness and headaches.   Hematological:  Negative for adenopathy. Does not bruise/bleed easily.   Psychiatric/Behavioral:  Negative for agitation.        Objective:   Vitals:   Vitals:    03/06/25 0856   BP: 130/86   Pulse: 87       Physical Exam  Vitals reviewed.   Constitutional:       General: She is not in acute distress.     Appearance: She is not diaphoretic.   HENT:      Head: Normocephalic and atraumatic.      Mouth/Throat:      Pharynx: No oropharyngeal exudate.   Eyes:      General: No scleral icterus.        Right eye: No discharge.         Left eye: No discharge.      Conjunctiva/sclera: Conjunctivae normal.      Pupils: Pupils are equal, round, and reactive to light.   Cardiovascular:      Rate and Rhythm: Normal rate and regular rhythm.   Abdominal:      General: There is distension.      Palpations: There is no mass.      Tenderness: There is no abdominal tenderness. There is no guarding.      Comments: firm   Musculoskeletal:         General: Normal range of motion.      Cervical back: Normal range of motion.   Skin:     General: Skin is warm and dry.      Coloration: Skin is not pale.      Findings: No erythema or rash.   Neurological:      Mental Status: She is alert and oriented to person, place, and time.         IMPRESSION     Problem List Items Addressed This Visit    None  Visit Diagnoses         Chronic idiopathic constipation    -  Primary     Relevant Medications    sodium,potassium,mag sulfates (SUPREP BOWEL PREP KIT) 17.5-3.13-1.6 gram SolR    Other Relevant Orders    Ambulatory referral/consult to Endo Procedure       Abdominal pain, unspecified abdominal location        Relevant Orders    Ambulatory referral/consult to Endo Procedure       Abdominal bloating                PLANS:    - Start onto Miralax 1-2 capfuls every evening   - Feel the bloating and distention is likely related to the constipation  - Prior KUB reviewed  - May continue with high fiber diet with plenty of fluid intake. However if fiber is causing worsening cramping/bloating, would just stick with the Miralax for now  - Schedule for colonoscopy. Suprep e-scribed to patient's pharmacy  - Further recommendations pending the above    Chronic idiopathic constipation  -     Ambulatory referral/consult to Endo Procedure ; Future; Expected date: 03/07/2025    Abdominal pain, unspecified abdominal location  -     Ambulatory referral/consult to Endo Procedure ; Future; Expected date: 03/07/2025    Abdominal bloating    Other orders  -     sodium,potassium,mag sulfates (SUPREP BOWEL PREP KIT) 17.5-3.13-1.6 gram SolR; Take 177 mLs by mouth once daily. for 2 days  Dispense: 1 each; Refill: 0        Adenike Moseley MD  Gastroenterology              [1]   Current Outpatient Medications on File Prior to Visit   Medication Sig Dispense Refill    azelastine (ASTELIN) 137 mcg (0.1 %) nasal spray Use 2 sprays (274 mcg total) in each nostril 2 (two) times daily. 30 mL 1    buPROPion (WELLBUTRIN XL) 150 MG TB24 tablet Take 1 tablet (150 mg total) by mouth every morning. 90 tablet 3    ciclopirox (LOPROX) 0.77 % Susp Apply topically 2 (two) times daily. 30 mL 1    ferrous sulfate (FEOSOL) 325 mg (65 mg iron) Tab tablet Take 1 tablet (325 mg total) by mouth every Mon, Wed, Fri. 48 tablet 3    fluticasone propionate (FLONASE) 50 mcg/actuation nasal spray Use 2 sprays  (100 mcg total) in each nostril once daily. 16 g 11    hydrocortisone 2.5 % cream Apply SPARINGLY to areas of skin irritation twice daily as needed (Use only as needed, max use 5 days.) 20 g 0    ketoconazole (NIZORAL) 2 % shampoo Apply to a clean scalp and skin under facial hair twice weekly for 4 weeks. 240 mL 2    loratadine (CLARITIN) 10 mg tablet Take 1 tablet (10 mg total) by mouth once daily. 90 tablet 3    montelukast (SINGULAIR) 10 mg tablet Take 1 tablet (10 mg total) by mouth every evening. 30 tablet 1    oxymetazoline (AFRIN, OXYMETAZOLINE,) 0.05 % nasal spray Use 1 to 2 sprays in each nostril once daily each evening as needed for congestion. DO NOT USE FOR MORE THAN 4 DAYS. 15 mL 0    triamcinolone acetonide 0.1% (KENALOG) 0.1 % ointment Apply topically 2 (two) times daily. 30 g 3    mirabegron (MYRBETRIQ) 50 mg Tb24 Take 1 tablet (50 mg total) by mouth Daily. (Patient not taking: Reported on 3/6/2025) 30 tablet 5     Current Facility-Administered Medications on File Prior to Visit   Medication Dose Route Frequency Provider Last Rate Last Admin    medroxyPROGESTERone (DEPO-PROVERA) injection 150 mg  150 mg Intramuscular Q90 Days    150 mg at 02/26/25 0829   [2]   Social History  Socioeconomic History    Marital status: Single   Tobacco Use    Smoking status: Never     Passive exposure: Past    Smokeless tobacco: Never   Substance and Sexual Activity    Alcohol use: Yes    Drug use: Yes     Types: Marijuana    Sexual activity: Yes     Partners: Female     Social Drivers of Health     Financial Resource Strain: Medium Risk (9/4/2024)    Overall Financial Resource Strain (CARDIA)     Difficulty of Paying Living Expenses: Somewhat hard   Food Insecurity: Food Insecurity Present (9/4/2024)    Hunger Vital Sign     Worried About Running Out of Food in the Last Year: Sometimes true     Ran Out of Food in the Last Year: Sometimes true   Transportation Needs: No Transportation Needs (2/22/2024)    Received from  [facility]    PRAPARE - Transportation     Lack of Transportation (Medical): No     Lack of Transportation (Non-Medical): No   Physical Activity: Unknown (9/4/2024)    Exercise Vital Sign     Days of Exercise per Week: 0 days   Stress: Stress Concern Present (9/4/2024)    [employer] [place] [employer] - Occupational Stress Questionnaire     Feeling of Stress : To some extent   Housing Stability: Unknown (9/4/2024)    Housing Stability Vital Sign     Unable to Pay for Housing in the Last Year: No

## 2025-03-07 ENCOUNTER — HOSPITAL ENCOUNTER (OUTPATIENT)
Dept: PREADMISSION TESTING | Facility: HOSPITAL | Age: 20
Discharge: HOME | End: 2025-03-07
Attending: INTERNAL MEDICINE
Payer: COMMERCIAL

## 2025-03-07 DIAGNOSIS — K59.04 CHRONIC IDIOPATHIC CONSTIPATION: Primary | ICD-10-CM

## 2025-03-07 DIAGNOSIS — R10.9 ABDOMINAL PAIN, UNSPECIFIED ABDOMINAL LOCATION: ICD-10-CM

## 2025-03-17 ENCOUNTER — ON-DEMAND VIRTUAL (OUTPATIENT)
Dept: URGENT CARE | Facility: CLINIC | Age: 20
End: 2025-03-17
Payer: COMMERCIAL

## 2025-03-17 DIAGNOSIS — M54.2 CERVICALGIA: Primary | ICD-10-CM

## 2025-03-17 PROCEDURE — 98005 SYNCH AUDIO-VIDEO EST LOW 20: CPT | Mod: CC,95,, | Performed by: NURSE PRACTITIONER

## 2025-03-17 RX ORDER — CELECOXIB 200 MG/1
200 CAPSULE ORAL DAILY
Qty: 30 CAPSULE | Refills: 1 | Status: SHIPPED | OUTPATIENT
Start: 2025-03-17

## 2025-03-17 RX ORDER — TIZANIDINE 4 MG/1
4 TABLET ORAL EVERY 8 HOURS PRN
Qty: 30 TABLET | Refills: 1 | Status: SHIPPED | OUTPATIENT
Start: 2025-03-17

## 2025-03-17 NOTE — PROGRESS NOTES
Subjective:      Patient ID: Natalia Sue is a 20 y.o. female.    Vitals:  vitals were not taken for this visit.     Chief Complaint: Back Pain      Visit Type: TELE AUDIOVISUAL    Past Medical History:   Diagnosis Date    Bronchitis     Low iron     Pre-diabetes      Past Surgical History:   Procedure Laterality Date    WISDOM TOOTH EXTRACTION       Review of patient's allergies indicates:   Allergen Reactions    Ibuprofen Swelling    Naproxen Swelling     Eye swelling    Nsaids (non-steroidal anti-inflammatory drug) Swelling    Aspirin-acetaminophen-caffeine     Excedrin ib      Eye swelling    Penicillins Hives, Itching, Rash and Swelling     Medications Ordered Prior to Encounter[1]  Family History   Problem Relation Name Age of Onset    Diabetes Mother      Hypertension Mother      Hyperlipidemia Mother      Multiple sclerosis Mother      Diabetes Father      Malignant hypertension Father      Hyperlipidemia Father      Diabetes Maternal Grandmother      No Known Problems Maternal Grandfather      No Known Problems Paternal Grandmother         Medications Ordered                [facility]   [id number] [place] [id number]    Telephone: [phone]   Fax: [phone]   Hours: Mon-Fri, 8a-5:30p                         Internal Pharmacy (2 of 2)              celecoxib (CELEBREX) 200 MG capsule    Sig: Take 1 capsule (200 mg total) by mouth once daily.       Start: 3/17/25     Quantity: 30 capsule Refills: 1                         tiZANidine (ZANAFLEX) 4 MG tablet    Sig: Take 1 tablet (4 mg total) by mouth every 8 (eight) hours as needed (neck pain).       Start: 3/17/25     Quantity: 30 tablet Refills: 1                           Oh[facility] Peq Odvv Intake    3/17/2025  3:58 PM CDT - Filed by Patient   What is your current physical address in the event of a medical emergency? Work   Are you able to take your vital signs? No   Please attach any relevant images or files    Is your employer  contracted with [facility] [employer]? No         Pt presents with c/o neck and back pain, primarily neck, which has been an issue for her since she was in middle school.  No reported injury.  Has tried multiple NSAIDs-they make her left eye swell.  Currently taking robaxin without relief.  She is now having some radicular pain down her arm.  She cannot get into her pain medicine provider until June.    Two patient identifiers were used-name was repeated verbally as well as date of birth.  The patient was located at their workplace in the [facility].          Neck: Positive for neck pain.   Neurological:  Positive for headaches, numbness and tingling.        Objective:   The physical exam was conducted virtually.  Physical Exam   Constitutional: She is oriented to person, place, and time. No distress.   HENT:   Head: Normocephalic and atraumatic.   Pulmonary/Chest: Effort normal. No respiratory distress.   Abdominal: Normal appearance.   Musculoskeletal: Normal range of motion.         General: Normal range of motion.      Cervical back: She exhibits tenderness.   Neurological: no focal deficit. She is alert and oriented to person, place, and time.   Skin: Skin is not pale.   Psychiatric: Her behavior is normal. Mood, judgment and thought content normal.       Assessment:     1. Cervicalgia        Plan:       Cervicalgia  -     Ambulatory referral/consult to Orthopedics    Other orders  -     celecoxib (CELEBREX) 200 MG capsule; Take 1 capsule (200 mg total) by mouth once daily.  Dispense: 30 capsule; Refill: 1  -     tiZANidine (ZANAFLEX) 4 MG tablet; Take 1 tablet (4 mg total) by mouth every 8 (eight) hours as needed (neck pain).  Dispense: 30 tablet; Refill: 1      Meds as above.  F/u in clinic if symptoms fail to resolve with treatment.    Referral to ortho.    You must understand that you've received a virtual Care treatment only and that you may be released before all your medical problems are known or  treated. You, the patient, will arrange for follow up care as instructed.  If your condition worsens we recommend that you receive another evaluation at an urgent care in person, the emergency room or contact your primary medical clinics after hours call service to discuss your concerns.            Present with the patient at the time of consultation: TELEMED PRESENT WITH PATIENT: None           [1]   Current Outpatient Medications on File Prior to Visit   Medication Sig Dispense Refill    azelastine (ASTELIN) 137 mcg (0.1 %) nasal spray Use 2 sprays (274 mcg total) in each nostril 2 (two) times daily. (Patient not taking: Reported on 3/13/2025) 30 mL 1    buPROPion (WELLBUTRIN XL) 150 MG TB24 tablet Take 1 tablet (150 mg total) by mouth every morning. (Patient not taking: Reported on 3/13/2025) 90 tablet 3    ciclopirox (LOPROX) 0.77 % Susp Apply topically 2 (two) times daily. (Patient not taking: Reported on 3/13/2025) 30 mL 1    ferrous sulfate (FEOSOL) 325 mg (65 mg iron) Tab tablet Take 1 tablet (325 mg total) by mouth every Mon, Wed, Fri. (Patient not taking: Reported on 3/13/2025) 48 tablet 3    fluticasone propionate (FLONASE) 50 mcg/actuation nasal spray Use 2 sprays (100 mcg total) in each nostril once daily. (Patient not taking: Reported on 3/13/2025) 16 g 11    hydrocortisone 2.5 % cream Apply SPARINGLY to areas of skin irritation twice daily as needed (Use only as needed, max use 5 days.) (Patient not taking: Reported on 3/13/2025) 20 g 0    ketoconazole (NIZORAL) 2 % shampoo Apply to a clean scalp and skin under facial hair twice weekly for 4 weeks. (Patient not taking: Reported on 3/13/2025) 240 mL 2    loratadine (CLARITIN) 10 mg tablet Take 1 tablet (10 mg total) by mouth once daily. 90 tablet 3    mirabegron (MYRBETRIQ) 50 mg Tb24 Take 1 tablet (50 mg total) by mouth Daily. (Patient not taking: Reported on 3/13/2025) 30 tablet 5    montelukast (SINGULAIR) 10 mg tablet Take 1 tablet (10 mg total) by  mouth every evening. 30 tablet 1    oxymetazoline (AFRIN, OXYMETAZOLINE,) 0.05 % nasal spray Use 1 to 2 sprays in each nostril once daily each evening as needed for congestion. DO NOT USE FOR MORE THAN 4 DAYS. (Patient not taking: Reported on 3/13/2025) 15 mL 0    triamcinolone acetonide 0.1% (KENALOG) 0.1 % ointment Apply topically 2 (two) times daily. (Patient not taking: Reported on 3/13/2025) 30 g 3     Current Facility-Administered Medications on File Prior to Visit   Medication Dose Route Frequency Provider Last Rate Last Admin    medroxyPROGESTERone (DEPO-PROVERA) injection 150 mg  150 mg Intramuscular Q90 Days    150 mg at 02/26/25 0832

## 2025-03-20 ENCOUNTER — OFFICE VISIT (OUTPATIENT)
Dept: UROLOGY | Facility: CLINIC | Age: 20
End: 2025-03-20
Payer: COMMERCIAL

## 2025-03-20 ENCOUNTER — ANESTHESIA EVENT (OUTPATIENT)
Dept: ENDOSCOPY | Facility: HOSPITAL | Age: 20
End: 2025-03-20
Payer: COMMERCIAL

## 2025-03-20 ENCOUNTER — NURSE TRIAGE (OUTPATIENT)
Dept: ADMINISTRATIVE | Facility: CLINIC | Age: 20
End: 2025-03-20
Payer: COMMERCIAL

## 2025-03-20 VITALS — WEIGHT: 143.06 LBS | HEIGHT: 57 IN | BODY MASS INDEX: 30.86 KG/M2

## 2025-03-20 DIAGNOSIS — R31.29 OTHER MICROSCOPIC HEMATURIA: ICD-10-CM

## 2025-03-20 DIAGNOSIS — N39.41 URGE INCONTINENCE OF URINE: ICD-10-CM

## 2025-03-20 DIAGNOSIS — N39.44 NOCTURNAL ENURESIS: Primary | ICD-10-CM

## 2025-03-20 LAB
BACTERIA #/AREA URNS HPF: ABNORMAL /HPF
BILIRUB UR QL STRIP: NEGATIVE
GLUCOSE UR QL STRIP: NEGATIVE
KETONES UR QL STRIP: NEGATIVE
LEUKOCYTE ESTERASE UR QL STRIP: POSITIVE
MICROSCOPIC COMMENT: ABNORMAL
PH, POC UA: 7
POC BLOOD, URINE: POSITIVE
POC NITRATES, URINE: NEGATIVE
PROT UR QL STRIP: NEGATIVE
RBC #/AREA URNS HPF: 7 /HPF (ref 0–4)
SP GR UR STRIP: 1.01 (ref 1–1.03)
SQUAMOUS #/AREA URNS HPF: 4 /HPF
UROBILINOGEN UR STRIP-ACNC: 0.2 (ref 0.1–1.1)
WBC #/AREA URNS HPF: 2 /HPF (ref 0–5)

## 2025-03-20 PROCEDURE — 1160F RVW MEDS BY RX/DR IN RCRD: CPT | Mod: CPTII,S$GLB,, | Performed by: UROLOGY

## 2025-03-20 PROCEDURE — 3008F BODY MASS INDEX DOCD: CPT | Mod: CPTII,S$GLB,, | Performed by: UROLOGY

## 2025-03-20 PROCEDURE — 99214 OFFICE O/P EST MOD 30 MIN: CPT | Mod: S$GLB,,, | Performed by: UROLOGY

## 2025-03-20 PROCEDURE — 99999 PR PBB SHADOW E&M-EST. PATIENT-LVL IV: CPT | Mod: PBBFAC,,, | Performed by: UROLOGY

## 2025-03-20 PROCEDURE — 1159F MED LIST DOCD IN RCRD: CPT | Mod: CPTII,S$GLB,, | Performed by: UROLOGY

## 2025-03-20 PROCEDURE — 81000 URINALYSIS NONAUTO W/SCOPE: CPT | Performed by: UROLOGY

## 2025-03-20 PROCEDURE — 81003 URINALYSIS AUTO W/O SCOPE: CPT | Mod: QW,S$GLB,, | Performed by: UROLOGY

## 2025-03-20 NOTE — PROGRESS NOTES
"Chief Complaint:   Encounter Diagnoses   Name Primary?    Nocturnal enuresis Yes    Other microscopic hematuria     Urge incontinence of urine        HPI:  HPI Natalia Sue is a 20 y.o. female who presents with follow up from nocturnal enuresis and daytime erectile bladder.  She comes back today stating that overall she is doing better.  She has made some behavioral changes.  She is voiding more preemptively.  She did fill her prescriptions but never started it.  She is going to continue to try behavioral changes before starting medication.  She is also undergoing workup for GI related issues including diarrhea.    History:  Social History[1]  Past Medical History:   Diagnosis Date    Bronchitis     Low iron     Pre-diabetes      Past Surgical History:   Procedure Laterality Date    WISDOM TOOTH EXTRACTION       Family History   Problem Relation Name Age of Onset    Diabetes Mother      Hypertension Mother      Hyperlipidemia Mother      Multiple sclerosis Mother      Diabetes Father      Malignant hypertension Father      Hyperlipidemia Father      Diabetes Maternal Grandmother      No Known Problems Maternal Grandfather      No Known Problems Paternal Grandmother         Medications Ordered Prior to Encounter[2]     Objective:     Vitals:    03/20/25 0909   Weight: 64.9 kg (143 lb 1.3 oz)   Height: 4' 9" (1.448 m)      BMI Readings from Last 1 Encounters:   03/20/25 30.96 kg/m²          Physical Exam  No acute distress alert and oriented   Respirations even unlabored   Abdomen is soft nontender    Urinalysis continues to show blood  Lab Results   Component Value Date    CREATININE 0.7 05/06/2024      Assessment:       1. Nocturnal enuresis    2. Other microscopic hematuria    3. Urge incontinence of urine        Plan:     1. Nocturnal enuresis    2. Other microscopic hematuria    3. Urge incontinence of urine       Orders Placed This Encounter    Urinalysis Microscopic    POCT Urinalysis, Dipstick, Automated, " W/O Scope      Discussed due to her symptoms which are persistent and microscopic hematuria would like to do a cystoscopy to rule out other causes.  She will hold off on medications for now.  We will schedule this in a few weeks.       [1]   Social History  Tobacco Use    Smoking status: Never     Passive exposure: Past    Smokeless tobacco: Never   Substance Use Topics    Alcohol use: Yes    Drug use: Yes     Types: Marijuana   [2]   Current Outpatient Medications on File Prior to Visit   Medication Sig Dispense Refill    azelastine (ASTELIN) 137 mcg (0.1 %) nasal spray Use 2 sprays (274 mcg total) in each nostril 2 (two) times daily. 30 mL 1    buPROPion (WELLBUTRIN XL) 150 MG TB24 tablet Take 1 tablet (150 mg total) by mouth every morning. 90 tablet 3    celecoxib (CELEBREX) 200 MG capsule Take 1 capsule (200 mg total) by mouth once daily. 30 capsule 1    ciclopirox (LOPROX) 0.77 % Susp Apply topically 2 (two) times daily. 30 mL 1    ferrous sulfate (FEOSOL) 325 mg (65 mg iron) Tab tablet Take 1 tablet (325 mg total) by mouth every Mon, Wed, Fri. 48 tablet 3    fluticasone propionate (FLONASE) 50 mcg/actuation nasal spray Use 2 sprays (100 mcg total) in each nostril once daily. 16 g 11    hydrocortisone 2.5 % cream Apply SPARINGLY to areas of skin irritation twice daily as needed (Use only as needed, max use 5 days.) 20 g 0    ketoconazole (NIZORAL) 2 % shampoo Apply to a clean scalp and skin under facial hair twice weekly for 4 weeks. 240 mL 2    loratadine (CLARITIN) 10 mg tablet Take 1 tablet (10 mg total) by mouth once daily. 90 tablet 3    mirabegron (MYRBETRIQ) 50 mg Tb24 Take 1 tablet (50 mg total) by mouth Daily. 30 tablet 5    montelukast (SINGULAIR) 10 mg tablet Take 1 tablet (10 mg total) by mouth every evening. 30 tablet 1    oxymetazoline (AFRIN, OXYMETAZOLINE,) 0.05 % nasal spray Use 1 to 2 sprays in each nostril once daily each evening as needed for congestion. DO NOT USE FOR MORE THAN 4 DAYS. 15  mL 0    tiZANidine (ZANAFLEX) 4 MG tablet Take 1 tablet (4 mg total) by mouth every 8 (eight) hours as needed (neck pain). 30 tablet 1    triamcinolone acetonide 0.1% (KENALOG) 0.1 % ointment Apply topically 2 (two) times daily. 30 g 3     Current Facility-Administered Medications on File Prior to Visit   Medication Dose Route Frequency Provider Last Rate Last Admin    medroxyPROGESTERone (DEPO-PROVERA) injection 150 mg  150 mg Intramuscular Q90 Days    150 mg at 02/26/25 0871

## 2025-03-20 NOTE — ANESTHESIA PREPROCEDURE EVALUATION
03/20/2025  Natalia Sue is a 20 y.o., female.    Past Medical History:   Diagnosis Date    Bronchitis     Low iron     Pre-diabetes      Past Surgical History:   Procedure Laterality Date    WISDOM TOOTH EXTRACTION       Patient Active Problem List   Diagnosis    Chronic midline low back pain without sciatica    Neck pain    Microscopic hematuria    Recurrent mild major depressive disorder with anxiety    Granulocytosis    Class 1 obesity due to excess calories with serious comorbidity and body mass index (BMI) of 31.0 to 31.9 in adult    Prediabetes    Seasonal allergic rhinitis       Pre-op Assessment    I have reviewed the Patient Summary Reports.     I have reviewed the Nursing Notes. I have reviewed the NPO Status.   I have reviewed the Medications.     Review of Systems  Anesthesia Hx:  No problems with previous Anesthesia   Neg history of prior surgery.          Denies Family Hx of Anesthesia complications.    Denies Personal Hx of Anesthesia complications.                    Social:  Alcohol Use, Non-Smoker, Recreational Drugs Marijuana, passive exposure tobacco      Hepatic/GI:  Bowel Prep.                   Endocrine:        Obesity / BMI > 30  Psych:  Psychiatric History anxiety depression                Physical Exam  General: Cooperative, Alert and Oriented    Airway:  Mallampati: II   Mouth Opening: Normal  TM Distance: Normal  Tongue: Normal  Neck ROM: Normal ROM    Dental:  Intact        Anesthesia Plan  Type of Anesthesia, risks & benefits discussed:    Anesthesia Type: Gen Natural Airway  Intra-op Monitoring Plan: Standard ASA Monitors  Post Op Pain Control Plan: multimodal analgesia  Induction:  IV  Informed Consent: Informed consent signed with the Patient and all parties understand the risks and agree with anesthesia plan.  All questions answered.   ASA Score: 2  Day of Surgery Review  of History & Physical: H&P Update referred to the surgeon/provider.    Ready For Surgery From Anesthesia Perspective.     .

## 2025-03-21 ENCOUNTER — ANESTHESIA (OUTPATIENT)
Dept: ENDOSCOPY | Facility: HOSPITAL | Age: 20
End: 2025-03-21
Payer: COMMERCIAL

## 2025-03-21 ENCOUNTER — HOSPITAL ENCOUNTER (OUTPATIENT)
Dept: ENDOSCOPY | Facility: HOSPITAL | Age: 20
Discharge: HOME | End: 2025-03-21
Attending: INTERNAL MEDICINE | Admitting: INTERNAL MEDICINE
Payer: COMMERCIAL

## 2025-03-21 VITALS
SYSTOLIC BLOOD PRESSURE: 107 MMHG | HEART RATE: 84 BPM | DIASTOLIC BLOOD PRESSURE: 69 MMHG | BODY MASS INDEX: 29.46 KG/M2 | WEIGHT: 136.56 LBS | HEIGHT: 57 IN | TEMPERATURE: 98 F | RESPIRATION RATE: 17 BRPM | OXYGEN SATURATION: 100 %

## 2025-03-21 DIAGNOSIS — R14.0 ABDOMINAL BLOATING: ICD-10-CM

## 2025-03-21 DIAGNOSIS — R10.9 ABDOMINAL CRAMPING: Primary | ICD-10-CM

## 2025-03-21 DIAGNOSIS — R10.9 ABDOMINAL PAIN, UNSPECIFIED ABDOMINAL LOCATION: ICD-10-CM

## 2025-03-21 DIAGNOSIS — K59.04 CHRONIC IDIOPATHIC CONSTIPATION: ICD-10-CM

## 2025-03-21 LAB
B-HCG UR QL: NEGATIVE
CTP QC/QA: YES

## 2025-03-21 PROCEDURE — 63600175 PHARM REV CODE 636 W HCPCS: Performed by: NURSE ANESTHETIST, CERTIFIED REGISTERED

## 2025-03-21 PROCEDURE — 25000003 PHARM REV CODE 250: Performed by: INTERNAL MEDICINE

## 2025-03-21 PROCEDURE — 37000008 HC ANESTHESIA 1ST 15 MINUTES

## 2025-03-21 PROCEDURE — 81025 URINE PREGNANCY TEST: CPT | Performed by: INTERNAL MEDICINE

## 2025-03-21 RX ORDER — DEXTROMETHORPHAN/PSEUDOEPHED 2.5-7.5/.8
DROPS ORAL
Status: COMPLETED | OUTPATIENT
Start: 2025-03-21 | End: 2025-03-21

## 2025-03-21 RX ORDER — PROPOFOL 10 MG/ML
VIAL (ML) INTRAVENOUS
Status: DISCONTINUED | OUTPATIENT
Start: 2025-03-21 | End: 2025-03-21

## 2025-03-21 RX ORDER — SODIUM CHLORIDE 9 MG/ML
INJECTION, SOLUTION INTRAVENOUS CONTINUOUS
Status: DISCONTINUED | OUTPATIENT
Start: 2025-03-21 | End: 2025-03-22 | Stop reason: HOSPADM

## 2025-03-21 RX ORDER — LIDOCAINE HYDROCHLORIDE 20 MG/ML
INJECTION, SOLUTION EPIDURAL; INFILTRATION; INTRACAUDAL; PERINEURAL
Status: DISCONTINUED | OUTPATIENT
Start: 2025-03-21 | End: 2025-03-21

## 2025-03-21 RX ADMIN — PROPOFOL 50 MG: 10 INJECTION, EMULSION INTRAVENOUS at 10:03

## 2025-03-21 RX ADMIN — LIDOCAINE HYDROCHLORIDE 40 MG: 20 INJECTION, SOLUTION EPIDURAL; INFILTRATION; INTRACAUDAL; PERINEURAL at 10:03

## 2025-03-21 RX ADMIN — SIMETHICONE 200 MG: 20 SUSPENSION/ DROPS ORAL at 10:03

## 2025-03-21 RX ADMIN — PROPOFOL 100 MG: 10 INJECTION, EMULSION INTRAVENOUS at 10:03

## 2025-03-21 NOTE — H&P
Short Stay Endoscopy History and Physical    PCP - CHERI Tellez MD    Procedure - Colonoscopy  ASA - per anesthesia  Mallampati - per anesthesia  History of Anesthesia problems - no  Family history Anesthesia problems -  no     HPI:  This is a 20 y.o. female here for evaluation of :   Active Hospital Problems    Diagnosis  POA    *Chronic idiopathic constipation [K59.04]  Yes    Abdominal cramping [R10.9]  No    Abdominal bloating [R14.0]  No      Resolved Hospital Problems   No resolved problems to display.         Health Maintenance         Date Due Completion Date    COVID-19 Vaccine (4 - 2024-25 season) 09/01/2024 1/31/2022    TETANUS VACCINE 02/02/2025 2/2/2015    Chlamydia Screening 09/22/2025 9/22/2024    Hemoglobin A1c (Prediabetes) 12/18/2025 12/18/2024    Pap Smear 02/26/2028 2/26/2025    RSV Vaccine (Age 60+ and Pregnant patients) (1 - 1-dose 75+ series) 01/29/2079 ---              ROS:  CONSTITUTIONAL: Denies weight change,  fatigue, fevers, chills, night sweats.  CARDIOVASCULAR: Denies chest pain, shortness of breath, orthopnea and edema.  RESPIRATORY: Denies cough, hemoptysis, dyspnea, and wheezing.  GI: See HPI.    Medical History:   Past Medical History:   Diagnosis Date    Bronchitis     Low iron     Pre-diabetes        Surgical History:   Past Surgical History:   Procedure Laterality Date    WISDOM TOOTH EXTRACTION         Family History:   Family History   Problem Relation Name Age of Onset    Diabetes Mother      Hypertension Mother      Hyperlipidemia Mother      Multiple sclerosis Mother      Diabetes Father      Malignant hypertension Father      Hyperlipidemia Father      Diabetes Maternal Grandmother      No Known Problems Maternal Grandfather      No Known Problems Paternal Grandmother         Social History:   Social History[1]    Allergies:   Review of patient's allergies indicates:   Allergen Reactions    Ibuprofen Swelling    Naproxen Swelling     Eye swelling    Nsaids  (non-steroidal anti-inflammatory drug) Swelling    Aspirin-acetaminophen-caffeine     Excedrin ib      Eye swelling    Penicillins Hives, Itching, Rash and Swelling       Medications:   Medications Ordered Prior to Encounter[2]    Physical Exam:  Vital Signs:   Vitals:    03/21/25 0900   BP: 126/68   Pulse: 103   Resp: 16   Temp: 98.8 °F (37.1 °C)     General Appearance: Well appearing in no acute distress  ENT: OP clear  Chest: CTA B  CV: RRR, no m/r/g  Abd: s/nt/nd/nabs  Ext: no edema    Labs:Reviewed    IMP:  Active Hospital Problems    Diagnosis  POA    *Chronic idiopathic constipation [K59.04]  Yes    Abdominal cramping [R10.9]  No    Abdominal bloating [R14.0]  No      Resolved Hospital Problems   No resolved problems to display.         Plan:   I have explained the risks and benefits of colonoscopy to the patient including but not limited to bleeding, perforation, infection, and death. The patient wishes to proceed.         [1]   Social History  Tobacco Use    Smoking status: Never     Passive exposure: Past    Smokeless tobacco: Never   Substance Use Topics    Alcohol use: Yes    Drug use: Yes     Types: Marijuana   [2]   Current Outpatient Medications on File Prior to Encounter   Medication Sig Dispense Refill    ferrous sulfate (FEOSOL) 325 mg (65 mg iron) Tab tablet Take 1 tablet (325 mg total) by mouth every Mon, Wed, Fri. 48 tablet 3    loratadine (CLARITIN) 10 mg tablet Take 1 tablet (10 mg total) by mouth once daily. 90 tablet 3    montelukast (SINGULAIR) 10 mg tablet Take 1 tablet (10 mg total) by mouth every evening. 30 tablet 1    azelastine (ASTELIN) 137 mcg (0.1 %) nasal spray Use 2 sprays (274 mcg total) in each nostril 2 (two) times daily. 30 mL 1    buPROPion (WELLBUTRIN XL) 150 MG TB24 tablet Take 1 tablet (150 mg total) by mouth every morning. 90 tablet 3    celecoxib (CELEBREX) 200 MG capsule Take 1 capsule (200 mg total) by mouth once daily. 30 capsule 1    ciclopirox (LOPROX) 0.77 % Susp  Apply topically 2 (two) times daily. 30 mL 1    fluticasone propionate (FLONASE) 50 mcg/actuation nasal spray Use 2 sprays (100 mcg total) in each nostril once daily. 16 g 11    hydrocortisone 2.5 % cream Apply SPARINGLY to areas of skin irritation twice daily as needed (Use only as needed, max use 5 days.) 20 g 0    ketoconazole (NIZORAL) 2 % shampoo Apply to a clean scalp and skin under facial hair twice weekly for 4 weeks. 240 mL 2    mirabegron (MYRBETRIQ) 50 mg Tb24 Take 1 tablet (50 mg total) by mouth Daily. 30 tablet 5    oxymetazoline (AFRIN, OXYMETAZOLINE,) 0.05 % nasal spray Use 1 to 2 sprays in each nostril once daily each evening as needed for congestion. DO NOT USE FOR MORE THAN 4 DAYS. 15 mL 0    tiZANidine (ZANAFLEX) 4 MG tablet Take 1 tablet (4 mg total) by mouth every 8 (eight) hours as needed (neck pain). 30 tablet 1    triamcinolone acetonide 0.1% (KENALOG) 0.1 % ointment Apply topically 2 (two) times daily. 30 g 3     Current Facility-Administered Medications on File Prior to Encounter   Medication Dose Route Frequency Provider Last Rate Last Admin    medroxyPROGESTERone (DEPO-PROVERA) injection 150 mg  150 mg Intramuscular Q90 Days    150 mg at 02/26/25 0105

## 2025-03-21 NOTE — TELEPHONE ENCOUNTER
Pt is prepping for a colonoscopy. Took first dose of prep at 7pm. Finished drinking it around 8pm. She just vomited a few min ago. She is asking if she should continue with second dose at sched time. Pt has had many watery BM's since finishing first dose. Advised pt she should continue with second dose. Reassured her that she held the first dose down for a quite a while and the goal was to produce frequent BM's which she is having. She Vu. No further assistance needed.  Reason for Disposition   Question about upcoming scheduled surgery, procedure or test, no triage required, and triager able to answer question    Protocols used: Information Only Call - No Triage-A-

## 2025-03-21 NOTE — TRANSFER OF CARE
"Anesthesia Transfer of Care Note    Patient: Natalia Sue    Procedure(s) Performed: * No procedures listed *    Patient location: PACU    Anesthesia Type: general    Transport from OR: Transported from OR on room air with adequate spontaneous ventilation    Post pain: adequate analgesia    Post assessment: no apparent anesthetic complications and tolerated procedure well    Post vital signs: stable    Level of consciousness: awake    Nausea/Vomiting: no nausea/vomiting    Complications: none    Transfer of care protocol was followed      Last vitals: Visit Vitals  /68 (Patient Position: Sitting)   Pulse 103   Temp 37.1 °C (98.8 °F) (Temporal)   Resp 16   Ht 4' 9" (1.448 m)   Wt 62 kg (136 lb 9.2 oz)   SpO2 97%   Breastfeeding No   BMI 29.55 kg/m²     "

## 2025-03-21 NOTE — DISCHARGE INSTRUCTIONS
During your procedure today, you received medications for sedation.  These   medications may affect your judgment, balance and coordination.  Therefore,   for 24 hours, you have the following restrictions:   - DO NOT drive a car, operate machinery, make legal/financial decisions,   sign important papers or drink alcohol.    ACTIVITY:  Today: no heavy lifting, straining or running due to procedural   sedation/anesthesia.  The following day: return to full activity including work.  DIET:  Eat and drink normally unless instructed otherwise.                TREATMENT FOR COMMON SIDE EFFECTS:  - Mild abdominal pain, nausea, belching, bloating or excessive gas:  rest,   eat lightly and use a heating pad.  - Sore Throat: treat with throat lozenges and/or gargle with warm salt   water.  - Because air was used during the procedure, expelling large amounts of air   from your rectum or belching is normal.  - If a bowel prep was taken, you may not have a bowel movement for 1-3 days.    This is normal.  SYMPTOMS TO WATCH FOR AND REPORT TO YOUR PHYSICIAN:  1. Abdominal pain or bloating, other than gas cramps.  2. Chest pain.  3. Back pain.  4. Signs of infection such as: chills or fever occurring within 24 hours   after the procedure.  5. Rectal bleeding, which would show as bright red, maroon, or black stools.   (A tablespoon of blood from the rectum is not serious, especially if   hemorrhoids are present.)  6. Vomiting.  7. Weakness or dizziness.  GO DIRECTLY TO THE NEAREST EMERGENCY ROOM IF YOU HAVE ANY OF THE FOLLOWING:                 Difficulty breathing              Chills and/or fever over 101 F              Persistent vomiting and/or vomiting blood              Severe abdominal pain              Severe chest pain              Black, tarry stools              Bleeding- more than one tablespoon              Any other symptom or condition that you feel may need urgent attention    Your doctor recommends these additional  instructions:  If any biopsies were taken, your doctors clinic will contact you in 1 to 2   weeks with any results.  - Discharge patient to home.   - Resume previous diet.   - Continue present medications.     - Return to referring physician as previously scheduled.   - Patient has a contact number available for emergencies.  The signs and   symptoms of potential delayed complications were discussed with the   patient.  Return to normal activities tomorrow.  Written discharge   instructions were provided to the patient.  If you have any questions about the above instructions, call the GI   department at [phone] or call the endoscopy unit at [phone]   from 7am until 3 pm.  [facility] [place], EMERGENCY ROOM PHONE NUMBER:   [phone]  IF A COMPLICATION OR EMERGENCY SITUATION ARISES AND YOU ARE UNABLE TO REACH   YOUR PHYSICIAN - GO DIRECTLY TO THE EMERGENCY ROOM.  I have read or have had read to me these discharge instructions for my   procedure and have received a written copy.  I understand these   instructions and will follow-up with my physician if I have any questions.

## 2025-03-21 NOTE — PLAN OF CARE
Discharge instructions reviewed with patient and visitor. Handouts given & verbalized understanding with no further questions at this time. Dr. Moseley spoke to pt at bedside, reviewed procedure and findings, answered questions. with MD telephone number provided per AVS sheet. VSS on RA, no pain or nausea noted, tolerating po fluids, no complaints noted. Fall precautions reviewed, consents in chart, PIV removed at this time.

## 2025-03-21 NOTE — DISCHARGE SUMMARY
[facility] - Endoscopy 1st Fl  Discharge Note  Short Stay    Colonoscopy      OUTCOME: Patient tolerated treatment/procedure well without complication and is now ready for discharge.    DISPOSITION: Home or Self Care    FINAL DIAGNOSIS:  Chronic idiopathic constipation    FOLLOWUP: With primary care provider    DISCHARGE INSTRUCTIONS:  No discharge procedures on file.      Clinical Reference Documents Added to Patient Instructions         Document    DIVERTICULOSIS (ENGLISH)

## 2025-04-02 ENCOUNTER — TELEPHONE (OUTPATIENT)
Dept: GASTROENTEROLOGY | Facility: CLINIC | Age: 20
End: 2025-04-02
Payer: COMMERCIAL

## 2025-04-02 NOTE — TELEPHONE ENCOUNTER
Spoke with patient in regard to post procedure symptoms. Patient has been provided the number to contact the Endoscopy team for follow up and transferred with success.     ----- Message from Digna sent at 4/2/2025  3:47 PM CDT -----  Contact: self  .Type:  Needs Medical AdviceWho Called: .Natalia Richardson (please be specific): constantly going to the bathroom, nauseaHow long has patient had these symptoms: [facility]  Would the patient rather a call back or a response via [facility]? Call [facility] Call Back Number: [phone] Information: Pt states she is needing a call back urgently she states she is feeling unwell constantly going to the bathroom and nausea, colonoscopy was done on 3/21 and she is wanting to know what to do.

## 2025-04-03 ENCOUNTER — OFFICE VISIT (OUTPATIENT)
Dept: NEUROSURGERY | Facility: CLINIC | Age: 20
End: 2025-04-03
Payer: COMMERCIAL

## 2025-04-03 VITALS
HEART RATE: 94 BPM | WEIGHT: 142.88 LBS | BODY MASS INDEX: 30.91 KG/M2 | SYSTOLIC BLOOD PRESSURE: 114 MMHG | DIASTOLIC BLOOD PRESSURE: 76 MMHG

## 2025-04-03 DIAGNOSIS — M54.2 CERVICALGIA: Primary | ICD-10-CM

## 2025-04-03 PROCEDURE — 1159F MED LIST DOCD IN RCRD: CPT | Mod: CPTII,S$GLB,, | Performed by: PHYSICIAN ASSISTANT

## 2025-04-03 PROCEDURE — 3078F DIAST BP <80 MM HG: CPT | Mod: CPTII,S$GLB,, | Performed by: PHYSICIAN ASSISTANT

## 2025-04-03 PROCEDURE — 3008F BODY MASS INDEX DOCD: CPT | Mod: CPTII,S$GLB,, | Performed by: PHYSICIAN ASSISTANT

## 2025-04-03 PROCEDURE — 99203 OFFICE O/P NEW LOW 30 MIN: CPT | Mod: S$GLB,,, | Performed by: PHYSICIAN ASSISTANT

## 2025-04-03 PROCEDURE — 3074F SYST BP LT 130 MM HG: CPT | Mod: CPTII,S$GLB,, | Performed by: PHYSICIAN ASSISTANT

## 2025-04-03 PROCEDURE — 99999 PR PBB SHADOW E&M-EST. PATIENT-LVL III: CPT | Mod: PBBFAC,,, | Performed by: PHYSICIAN ASSISTANT

## 2025-04-03 RX ORDER — METHYLPREDNISOLONE 4 MG/1
TABLET ORAL
Qty: 21 EACH | Refills: 0 | Status: SHIPPED | OUTPATIENT
Start: 2025-04-03 | End: 2025-04-24

## 2025-04-03 NOTE — PROGRESS NOTES
"Patient is a 20-year-old female who presents to clinic today for evaluation acute flare-up neck and back pain.  States she has had Neck and back pain since middle school. A few weeks ago noted neck pain into left upper extremity. Pt. Does get massages eevry month. Was told her body has "knots". 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 Back aching and arms aching. Left hand / arm pain/ hand grasp spasming.     She did see Dr. Marina in the past and was given Robaxin.    States this was not beneficial.      Pt. Completed PT, but did not help her. Pt. Reports was in healthy back program. Twice a week at [facility]. Monday morning and Friday afternoon - at [facility] location. Did do dry needling PT in [facility] pt.     Pt does crack neck and back for relief, but reports starts aching again.       Had virtual visit and was prescribed Celebrex and zanaflex    Will send in medrol pack          Pertinent positive and negative ROS documented above in HPI, all other systems reviewed and found to be negative.             Denies focal weakness or acute bb dysfunction        Physical Exam:  Nursing note and vitals reviewed.     Constitutional: She appears well-nourished. She is not diaphoretic. No distress.      Eyes: Pupils are equal, round, and reactive to light. EOM are normal.      Cardiovascular: Normal rate and regular rhythm.      Psych/Behavior: She is alert. She is oriented to person, place, and time. She has a normal mood and affect.      Musculoskeletal:        Back: Range of motion is limited. There is tenderness. Muscle strength is 5/5.       Right Lower Extremities: Range of motion is full. There is no tenderness. Muscle strength is 5/5. Tone is normal.        Left Lower Extremities: There is no tenderness. Muscle strength is 5/5. Tone is normal.      Neurological:        Sensory: There is no sensory deficit in the trunk. There is " no sensory deficit in the extremities.        Cranial nerves: Cranial nerve(s) II, III, IV, V, VI, VII, VIII, IX, X, XI and XII are intact.      General     Nursing note and vitals reviewed.  Constitutional: She is oriented to person, place, and time. She appears well-nourished. No distress.   Eyes: EOM are normal. Pupils are equal, round, and reactive to light.   Cardiovascular:  Normal rate and regular rhythm.            Neurological: She is alert and oriented to person, place, and time.   Psychiatric: She has a normal mood and affect.                A/P:      Patient is a 20-year-old female with worsening neck pain with radiculopathy into left upper extremity she has tried conservative measures PT as well as muscle relaxers without any improvement of the last few months her neck pain is progressively worsening like to obtain an MRI cervical spine and will send in Medrol Dosepak she already has NSAIDs Celebrex advised her to start taking this and the muscle relaxer at night I advised on signs and symptoms that prompt urgent medical attention patient expressed understanding and agreement with plan of care      Follow up after imaging      Attestation:  Riya SAUCEDO PA-C have obtained HPI, performed Physical Examination on the above patient, reviewed the pertinent labs, tests, imaging, other relevant data and recorded my findings in this clinic note.          This note was produced using dictation software of voice recognition technology, and some typographical errors may be present.

## 2025-04-09 ENCOUNTER — HOSPITAL ENCOUNTER (OUTPATIENT)
Dept: RADIOLOGY | Facility: HOSPITAL | Age: 20
Discharge: HOME | End: 2025-04-09
Attending: PHYSICIAN ASSISTANT
Payer: COMMERCIAL

## 2025-04-09 DIAGNOSIS — M54.2 CERVICALGIA: ICD-10-CM

## 2025-04-09 PROCEDURE — 72141 MRI NECK SPINE W/O DYE: CPT | Mod: 26,,, | Performed by: RADIOLOGY

## 2025-04-09 PROCEDURE — 72141 MRI NECK SPINE W/O DYE: CPT | Mod: TC

## 2025-04-10 ENCOUNTER — PROCEDURE VISIT (OUTPATIENT)
Dept: UROLOGY | Facility: CLINIC | Age: 20
End: 2025-04-10
Payer: COMMERCIAL

## 2025-04-10 DIAGNOSIS — R31.29 OTHER MICROSCOPIC HEMATURIA: Primary | ICD-10-CM

## 2025-04-10 DIAGNOSIS — N39.44 NOCTURNAL ENURESIS: ICD-10-CM

## 2025-04-10 LAB
BILIRUB UR QL STRIP: NEGATIVE
GLUCOSE UR QL STRIP: NEGATIVE
KETONES UR QL STRIP: NEGATIVE
LEUKOCYTE ESTERASE UR QL STRIP: NEGATIVE
PH, POC UA: 5.5
POC BLOOD, URINE: POSITIVE
POC NITRATES, URINE: NEGATIVE
PROT UR QL STRIP: NEGATIVE
SP GR UR STRIP: 1.02 (ref 1–1.03)
UROBILINOGEN UR STRIP-ACNC: 0.2 (ref 0.1–1.1)

## 2025-04-10 PROCEDURE — 52000 CYSTOURETHROSCOPY: CPT | Mod: S$GLB,,, | Performed by: UROLOGY

## 2025-04-10 PROCEDURE — 81003 URINALYSIS AUTO W/O SCOPE: CPT | Mod: QW,S$GLB,, | Performed by: UROLOGY

## 2025-04-10 RX ORDER — LIDOCAINE HYDROCHLORIDE 20 MG/ML
JELLY TOPICAL
Status: COMPLETED | OUTPATIENT
Start: 2025-04-10 | End: 2025-04-10

## 2025-04-10 RX ORDER — CIPROFLOXACIN 500 MG/1
500 TABLET ORAL
Status: COMPLETED | OUTPATIENT
Start: 2025-04-10 | End: 2025-04-10

## 2025-04-10 RX ADMIN — LIDOCAINE HYDROCHLORIDE 11 ML: 20 JELLY TOPICAL at 08:04

## 2025-04-10 RX ADMIN — CIPROFLOXACIN 500 MG: 500 TABLET ORAL at 08:04

## 2025-04-10 NOTE — PROCEDURES
Cystoscopy    Date/Time: 4/10/2025 8:30 AM    Performed by: Daniel Russell MD  Authorized by: Daniel Russell MD    Consent Done?:  Yes (Written)  Timeout: prior to procedure the correct patient, procedure, and site was verified    Prep: patient was prepped and draped in usual sterile fashion    Anesthesia:  Intraurethral instillation  Local anesthetic:  Lidocaine 2% topical gel  Indications: hematuria    Position:  Dorsal lithotomy  Anesthesia:  Intraurethral instillation  Preparation: Patient was prepped and draped in usual sterile fashion    Scope type:  Flexible cystoscope   patient tolerated the procedure well with no immediate complications  Comments:      After informed consent, and preoperative antibiotic positioned in supine position.  Genitalia prepped and draped sterilely.  A 17 French flexible cystoscope was passed through the urethra into the bladder.  Systematic examination was performed.  Bilateral ureteral orifices were seen.  No urothelial lesion was seen. The scope was removed.  She tolerated procedure well.     Reassured patient that upper tracts and cystoscopy are normal.  Discussed nocturnal enuresis and overactive bladder.  At this time patient is not interested in medical therapy.  She has changed behavioral therapy.  Discussed if she chooses to start medication she is to let us know.  Otherwise we will see her on an as-needed basis.

## 2025-04-11 ENCOUNTER — OFFICE VISIT (OUTPATIENT)
Dept: NEUROSURGERY | Facility: CLINIC | Age: 20
End: 2025-04-11
Payer: COMMERCIAL

## 2025-04-11 ENCOUNTER — TELEPHONE (OUTPATIENT)
Dept: PHYSICAL MEDICINE AND REHAB | Facility: CLINIC | Age: 20
End: 2025-04-11
Payer: COMMERCIAL

## 2025-04-11 VITALS
HEART RATE: 111 BPM | WEIGHT: 140.44 LBS | BODY MASS INDEX: 30.39 KG/M2 | SYSTOLIC BLOOD PRESSURE: 119 MMHG | DIASTOLIC BLOOD PRESSURE: 78 MMHG

## 2025-04-11 DIAGNOSIS — M54.2 CERVICALGIA: ICD-10-CM

## 2025-04-11 DIAGNOSIS — M54.12 CERVICAL RADICULOPATHY: Primary | ICD-10-CM

## 2025-04-11 PROCEDURE — 99999 PR PBB SHADOW E&M-EST. PATIENT-LVL III: CPT | Mod: PBBFAC,,, | Performed by: PHYSICIAN ASSISTANT

## 2025-04-11 PROCEDURE — 3008F BODY MASS INDEX DOCD: CPT | Mod: CPTII,S$GLB,, | Performed by: PHYSICIAN ASSISTANT

## 2025-04-11 PROCEDURE — 99215 OFFICE O/P EST HI 40 MIN: CPT | Mod: S$GLB,,, | Performed by: PHYSICIAN ASSISTANT

## 2025-04-11 PROCEDURE — 3078F DIAST BP <80 MM HG: CPT | Mod: CPTII,S$GLB,, | Performed by: PHYSICIAN ASSISTANT

## 2025-04-11 PROCEDURE — 3074F SYST BP LT 130 MM HG: CPT | Mod: CPTII,S$GLB,, | Performed by: PHYSICIAN ASSISTANT

## 2025-04-11 RX ORDER — GABAPENTIN 300 MG/1
300 CAPSULE ORAL NIGHTLY
Qty: 30 CAPSULE | Refills: 11 | Status: SHIPPED | OUTPATIENT
Start: 2025-04-11 | End: 2026-04-11

## 2025-04-11 RX ORDER — METHOCARBAMOL 750 MG/1
750 TABLET, FILM COATED ORAL 3 TIMES DAILY PRN
Qty: 60 TABLET | Refills: 0 | Status: SHIPPED | OUTPATIENT
Start: 2025-04-11

## 2025-04-11 NOTE — PROGRESS NOTES
"Patient is 19 yo F with acute exacerbation of chronic neck pain -with neck pain 10/10 with radiculopathy into LUE.  Patient presents today for follow-up with MRIs C-spine which shows cord signal change at C2-3.  She does report Some dexterity issues intermittently. Pain and numness Throughout entire LUE with associated left hand weakness. Had incident last night with severe pain in the neck while washing dishes. Completed steroid pack. Did not start muscle relaxer.    Denies dropping objects or gait instability  Denies BB dysfunction      Negative hoffmans BL, 5/5 strength throughout NEGRITA. Good strength of BL interosseus M.       PREVIOUS HPI:        Patient is a 20-year-old female who presents to clinic today for evaluation acute flare-up neck and back pain.  States she has had Neck and back pain since middle school. A few weeks ago noted neck pain into left upper extremity. Pt. Does get massages eevry month. Was told her body has "knots". 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 Back aching and arms aching. Left hand / arm pain/ hand grasp spasming.     She did see Dr. Marina in the past and was given Robaxin.    States this was not beneficial.      Pt. Completed PT, but did not help her. Pt. Reports was in healthy back program[facility] Did do dry needling PT in [facility] pt.     Pt does crack neck and back for relief, but reports starts aching again.       Had virtual visit and was prescribed Celebrex and zanaflex    Will send in medrol pack          Pertinent positive and negative ROS documented above in HPI, all other systems reviewed and found to be negative.             Denies focal weakness or acute bb dysfunction        Physical Exam:  Nursing note and vitals reviewed.     Constitutional: She appears well-nourished. She is not diaphoretic. No " distress.      Eyes: Pupils are equal, round, and reactive to light. EOM are normal.      Cardiovascular: Normal rate and regular rhythm.      Psych/Behavior: She is alert. She is oriented to person, place, and time. She has a normal mood and affect.      Musculoskeletal:        Back: Range of motion is limited. There is tenderness. Muscle strength is 5/5.       Right Lower Extremities: Range of motion is full. There is no tenderness. Muscle strength is 5/5. Tone is normal.        Left Lower Extremities: There is no tenderness. Muscle strength is 5/5. Tone is normal.      Neurological:        Sensory: There is no sensory deficit in the trunk. There is no sensory deficit in the extremities.        Cranial nerves: Cranial nerve(s) II, III, IV, V, VI, VII, VIII, IX, X, XI and XII are intact.      General     Nursing note and vitals reviewed.  Constitutional: She is oriented to person, place, and time. She appears well-nourished. No distress.   Eyes: EOM are normal. Pupils are equal, round, and reactive to light.   Cardiovascular:  Normal rate and regular rhythm.            Neurological: She is alert and oriented to person, place, and time.   Psychiatric: She has a normal mood and affect.                A/P:      Patient is a 20-year-old female with worsening neck pain with radiculopathy into left upper extremity she has tried conservative measures PT as well as muscle relaxers without any improvement of the last few months her neck pain is progressively worsening like to obtain an MRI cervical spine and will send in Medrol Dosepak she already has NSAIDs Celebrex advised her to start taking this and the muscle relaxer at night I advised on signs and symptoms that prompt urgent medical attention patient expressed understanding and agreement with plan of care      I reviewed MRI C-spine with the patient I explained that there is disc bulge at C2-3 causing narrowing of the central spinal canal.  There is hyperintensity  within the cord signifying myelomalacia I explained that we would need to obtain EMG nerve conduction study and I will have her follow up with Dr. Khalil to discuss next steps.       Gabapentin and muscle relaxer      Given the patient also has back pain I would like to obtain MRI L-spine    Follow up after imaging      Attestation:  Riya SAUCEDO PA-C have obtained HPI, performed Physical Examination on the above patient, reviewed the pertinent labs, tests, imaging, other relevant data and recorded my findings in this clinic note.          This note was produced using dictation software of voice recognition technology, and some typographical errors may be present.             MRI CERVICAL SPINE WITHOUT CONTRAST     CLINICAL HISTORY:  Neck pain, chronic;.  Cervicalgia     TECHNIQUE:  Multiplanar, multisequence MR images of the cervical spine were acquired without the administration of contrast.     COMPARISON:  None.     FINDINGS:  The vertebral bodies demonstrate a normal height.  There is reversal of the normal lordotic curvature.  No fracture. No marrow signal abnormality suspicious for an infiltrative process.  There is disc desiccation noted throughout the lumbar spine.     The cervical cord is normal in caliber and signal characteristics.  The craniocervical junction and visualized intracranial contents are unremarkable.  The adjacent soft tissue structures show no significant abnormalities.     C2-C3:  There is a broad-based central disc protrusion that results in effacement of the thecal sac and mild to moderate effacement the ventral cord.  The AP dimension of the central canal at this level at its narrowest point measures approximately 6.5-7 mm.  There is equivocal slight increase in signal within the cord anteriorly at this level.  Mild myelopathy not excluded.     C3-C4:  No significant central canal or neural foraminal narrowing.     C4-C5:  There is mild diffuse disc bulge that results in minimal  effacement of the ventral thecal sac.  There is abutment of the anterior cord although the majority of the abutment is thought to be related to reversal of the normal lordotic curvature.  The AP dimension of the central canal at this level still measures 11 mm.  No neural foraminal canal narrowing.     C5-C6:  Mild diffuse disc bulge resulting in effacement of the ventral thecal sac and appearing to just abut the anterior cord.  No significant effacement of the cord.  The AP dimension of the central canal at this level still measures approximately 10.5 mm.  The abutment of the anterior cord is thought to be related to reversal.  The neural foraminal canals are widely patent.     C6-C7:  No significant central canal or neural foraminal narrowing.     C7-T1:   No significant central canal or neural foraminal narrowing.     Impression:     1. Broad-based central disc protrusion at the C2-3 level with mild-to-moderate effacement of ventral cord and equivocal mild increased signal seen within the ventral cord at this level.  Mild myelopathic changes not excluded.  2. Remaining findings as discussed above.        Electronically signed by:Caden Russell DO  Date:                                            04/10/2025  Time:                                           08:47

## 2025-04-11 NOTE — TELEPHONE ENCOUNTER
----- Message from Med Assistant Matos sent at 4/11/2025 10:40 AM CDT -----  Regarding: EMG  Erika Uriostegui, Can you please schedule this patient for EMG.ThanksShawna

## 2025-04-14 ENCOUNTER — TELEPHONE (OUTPATIENT)
Dept: NEUROSURGERY | Facility: CLINIC | Age: 20
End: 2025-04-14
Payer: COMMERCIAL

## 2025-04-14 ENCOUNTER — NURSE TRIAGE (OUTPATIENT)
Dept: ADMINISTRATIVE | Facility: CLINIC | Age: 20
End: 2025-04-14
Payer: COMMERCIAL

## 2025-04-14 ENCOUNTER — PATIENT MESSAGE (OUTPATIENT)
Dept: NEUROSURGERY | Facility: CLINIC | Age: 20
End: 2025-04-14
Payer: COMMERCIAL

## 2025-04-14 NOTE — TELEPHONE ENCOUNTER
----- Message from [employer] sent at 4/14/2025  8:07 AM CDT -----  Type: General Call Back Name of Caller:PtSymptoms:back painsWould the patient rather a call back or a response via [facility]? Call Callie Call Back Number:[phone] Additional Information: Pt request to have the NP. Pt is an empolyee. Please call ext above

## 2025-04-14 NOTE — TELEPHONE ENCOUNTER
Reason for Disposition   Second attempt to contact caller AND no contact made. Phone number verified.    Protocols used: No Contact or Duplicate Contact Call-A-AH

## 2025-04-14 NOTE — TELEPHONE ENCOUNTER
OOC RN  Patient calling about lower back pain.  LM on     Reason for Disposition   Message left on identified voice mail    Additional Information   Negative: Caller is angry or rude (e.g., hangs up, verbally abusive, yelling)   Negative: Caller hangs up    Protocols used: No Contact or Duplicate Contact Call-A-AH

## 2025-04-14 NOTE — TELEPHONE ENCOUNTER
I spoke with the pt, who has been informed that Riya is in surgery today but I will follow-up with the provider tomorrow regarding her concerns. She verbalized understanding.

## 2025-04-15 ENCOUNTER — TELEPHONE (OUTPATIENT)
Dept: PHYSICAL MEDICINE AND REHAB | Facility: CLINIC | Age: 20
End: 2025-04-15
Payer: COMMERCIAL

## 2025-04-15 NOTE — TELEPHONE ENCOUNTER
----- Message from [employer] sent at 4/14/2025  4:43 PM CDT -----  Contact: self  Type:  Sooner Apoointment RequestCaller is requesting a sooner appointment.  Caller declined first available appointment listed below.  Caller will not accept being placed on the waitlist and is requesting a message be sent to doctor.Name of Caller:Chantellgera BARRIENTOS Isabelle is the first available appointment?Symptoms:EMGWould the patient rather a call back or a response via [facility]? [facility] Call Back Number:[phone] Information: the patient is needing a call to reschedule her appointment tomorrow. She's working in [place]. She says you can make the appointment for her since she works at the [employer] as well.

## 2025-04-17 ENCOUNTER — OFFICE VISIT (OUTPATIENT)
Dept: PHYSICAL MEDICINE AND REHAB | Facility: CLINIC | Age: 20
End: 2025-04-17
Payer: COMMERCIAL

## 2025-04-17 VITALS — HEIGHT: 57 IN | WEIGHT: 140.44 LBS | BODY MASS INDEX: 30.3 KG/M2 | RESPIRATION RATE: 13 BRPM

## 2025-04-17 DIAGNOSIS — G56.02 CARPAL TUNNEL SYNDROME OF LEFT WRIST: ICD-10-CM

## 2025-04-17 PROCEDURE — 99999 PR PBB SHADOW E&M-EST. PATIENT-LVL III: CPT | Mod: PBBFAC,,, | Performed by: PHYSICAL MEDICINE & REHABILITATION

## 2025-04-17 NOTE — PROGRESS NOTES
[facility]  Department of Physiatry-EMG  [facility]   [address] [place] [address]           Full Name: Natalia Sue YOB: 2005  Patient ID: [id number]      Visit Date: 4/17/2025 13:57  Age: 20 Years  Examining Physician:   Referring Physician: ROULA Redmond  Conclusion: upper extr  Chief Complaint   Patient presents with    Neck Pain       HPI: This is a 20 y.o.  female being seen in clinic today for evaluation of     History obtained from patient    Past family, medical, social, and surgical history reviewed in chart    Review of Systems:     General- denies lethargy, weight change, fever, chills  Head/neck- denies swallowing difficulties  ENT- denies hearing changes  Cardiovascular-denies chest pain  Pulmonary- denies shortness of breath  GI- denies constipation or bowel incontinence  - denies bladder incontinence  Skin- denies wounds or rashes  Musculoskeletal- denies weakness, + pain  Neurologic- + numbness and tingling  Psychiatric- +depression, + anxiety  Lymphatic-denies swelling  Endocrine- denies hypoglycemic symptoms/DM history  All other pertinent systems negative     Physical Examination:  General: Well developed, well nourished female, NAD  HEENT:NCAT EOMI bilaterally   Pulmonary:Normal respirations    Spinal Examination: CERVICAL  Active ROM is within normal limits.  Inspection: No deformity of spinal alignment.    Musculoskeletal Tests:  Phalen: neg  Elbow compression (ulnar): neg  Tinels at wrist: neg    Bilateral Upper and Lower Extremities:  Pulses are 2+ at radial bilaterally.  Shoulder/Elbow/Wrist/Hand ROM wnl  Hip/Knee/Ankle ROM   Bilateral Extremities show normal capillary refill.  No signs of cyanosis, rubor, edema, skin changes, or dysvascular changes of appendages.  Nails appear intact.    Neurological Exam:  Cranial Nerves:  II-XII grossly intact    Manual Muscle Testing: (Motor 5=normal)  5/5 strength  bilateral upper extremities    No focal atrophy is noted of either upper extremity.    Bilateral Reflexes:  Acuna's response is absent bilaterally.  Sensation: tested to light touch  - intact in arms    Gait: Narrow base and good arm swing.      Entire procedure explained to patient prior to proceeding.  Verbal consent obtained      Sensory NCS      Nerve / Sites Rec. Site Onset Lat Peak Lat NP Amp PP Amp Segments Distance Velocity     ms ms µV µV  mm m/s   L Median - Digit II (Antidromic)      Wrist Dig II 2.40 2.94 21.1 18.5 Wrist - Dig  58   R Median - Digit II (Antidromic)      Wrist Dig II 2.25 2.79 18.5 25.9 Wrist - Dig  62   L Ulnar - Digit V (Antidromic)      Wrist Dig V 2.48 3.25 19.0 19.3 Wrist - Dig V 140 56   R Ulnar - Digit V (Antidromic)      Wrist Dig V 2.42 3.23 12.8 15.6 Wrist - Dig V 140 58   L Radial - Anatomical snuff box (Forearm)      Forearm Wrist 1.23 1.60 13.1 15.4 Forearm - Wrist 100 81   R Radial - Anatomical snuff box (Forearm)      Forearm Wrist 1.77 2.10 22.8 26.3 Forearm - Wrist 100 56                   Combined Sensory Index      Nerve / Sites Rec. Site Peak Lat NP Amp PP Amp Segments Peak Diff     ms µV µV  ms   L Median - CSI      Median Thumb 3.23 3.1 37.1 Median - Radial 0.77      Radial Thumb 2.46 22.7 10.2 Median - Ulnar 0.08      Median Ring 2.85 3.2 1.8 Median palm - Ulnar palm 0.42      Ulnar Ring 2.77 72.4 105.8        Median palm Wrist 1.48 81.1 52.3        Ulnar palm Wrist 1.06 1.5         CSI     CSI 1.27   R Median - CSI      Median Thumb 2.46 8.3 3.4 Median - Radial 0.15      Radial Thumb 2.31 33.4 18.2 Median - Ulnar -0.17      Median Ring 2.71 33.1 63.2 Median palm - Ulnar palm 0.17      Ulnar Ring 2.88 54.7 99.5        Median palm Wrist 1.52 33.2 46.6        Ulnar palm Wrist 1.35 84.5 25.7        CSI     CSI 0.15           Motor NCS      Nerve / Sites Muscle Latency Amplitude Amp % Duration Segments Distance Lat Diff Velocity     ms mV % ms  mm ms m/s    L Median - APB      Wrist APB 2.48 15.0 100 6.00 Wrist - APB 80        Elbow APB 5.69 15.3 102 6.13 Elbow - Wrist 180 3.21 56   R Median - APB      Wrist APB 2.67 12.0 100 5.67 Wrist - APB 80        Elbow APB 6.00 9.5 78.8 5.73 Elbow - Wrist 200 3.33 60   L Ulnar - ADM      Wrist ADM 2.88 11.5 100 6.44 Wrist - ADM 80        B.Elbow ADM 5.83 11.4 99.2 6.23 B.Elbow - Wrist 200 2.96 68      A.Elbow ADM 7.56 7.8 67.8 6.44 A.Elbow - B.Elbow 120 1.73 69   R Ulnar - ADM      Wrist ADM 2.50 10.8 100 4.29 Wrist - ADM 80        B.Elbow ADM 5.63 10.3 95.6 4.40 B.Elbow - Wrist 190 3.12 61      A.Elbow ADM 7.48 10.2 93.9 4.46 A.Elbow - B.Elbow 110 1.85 59               EMG Summary Table     Spontaneous MUAP Recruitment   Muscle Nerve Roots IA Fib PSW Fasc H.F. Amp Dur. PPP Pattern   L. Trapezius (upper) Accessory (spinal) C3-C4 N None None None None N N N N   L. Deltoid Axillary C5-C6 N None None None None N N N N   L. Pronator teres Median C6-C7 N None None None None N N N N   L. First dorsal interosseous Ulnar C8-T1 N None None None None N N N N           INTERPRETATION  -Bilateral median motor nerve conduction study showed normal latency, amplitude, and conduction velocity  -Bilateral median sensory nerve conduction study showed normal peak latency and amplitude  -Bilateral ulnar motor nerve conduction study showed normal latency, amplitude, and conduction velocity  -Bilateral ulnar sensory nerve conduction study showed normal peak latency and amplitude  -Bilateral radial sensory nerve conduction study showed normal peak latency and amplitude  -Left combined sensory index was significant, right was non significant  -Needle EMG examination performed to above mentioned muscles       IMPRESSION  ABNORMAL study  2. 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 There was no evidence of a C4-T1 radiculopathy     PLAN  Discussed in detail for greater  than 30 minutes about diagnosis and treatment plan    1. Follow up with referring provider: Riya Diaz  2. Handouts on CTS provided   3. This study is good for one year. If symptoms worsen or do not improve, please re-consult.    Keya Rdz M.D.  Physical Medicine and Rehab

## 2025-04-19 DIAGNOSIS — M54.9 DORSALGIA, UNSPECIFIED: Primary | ICD-10-CM

## 2025-05-14 ENCOUNTER — OFFICE VISIT (OUTPATIENT)
Dept: INTERNAL MEDICINE | Facility: CLINIC | Age: 20
End: 2025-05-14
Payer: COMMERCIAL

## 2025-05-14 ENCOUNTER — RESULTS FOLLOW-UP (OUTPATIENT)
Dept: INTERNAL MEDICINE | Facility: CLINIC | Age: 20
End: 2025-05-14

## 2025-05-14 VITALS
OXYGEN SATURATION: 99 % | DIASTOLIC BLOOD PRESSURE: 82 MMHG | WEIGHT: 143.31 LBS | TEMPERATURE: 99 F | HEART RATE: 88 BPM | BODY MASS INDEX: 30.92 KG/M2 | HEIGHT: 57 IN | SYSTOLIC BLOOD PRESSURE: 124 MMHG

## 2025-05-14 DIAGNOSIS — U07.1 COVID: Primary | ICD-10-CM

## 2025-05-14 DIAGNOSIS — R05.9 COUGH, UNSPECIFIED TYPE: ICD-10-CM

## 2025-05-14 DIAGNOSIS — J02.9 SORE THROAT: ICD-10-CM

## 2025-05-14 LAB
CTP QC/QA: YES
CTP QC/QA: YES
MOLECULAR STREP A: NEGATIVE
SARS-COV-2 RDRP RESP QL NAA+PROBE: POSITIVE

## 2025-05-14 PROCEDURE — 87651 STREP A DNA AMP PROBE: CPT | Mod: QW,S$GLB,, | Performed by: PEDIATRICS

## 2025-05-14 PROCEDURE — 99999 PR PBB SHADOW E&M-EST. PATIENT-LVL IV: CPT | Mod: PBBFAC,,, | Performed by: PEDIATRICS

## 2025-05-14 NOTE — PROGRESS NOTES
Patient ID: Natalia Sue is a 20 y.o. female.    Chief Complaint: Cough and Sore Throat    History of Present Illness    CHIEF COMPLAINT:  Patient presents today with cold symptoms    HISTORY OF PRESENT ILLNESS:  She reports onset of symptoms on Monday following attendance at an outdoor graduation party on Saturday where she was underdressed for the cold weather. Initial symptoms included coughing and sneezing. She developed hoarseness at 4 AM on the day of the visit, which has partially improved. She experiences burning and itching sensation in throat when coughing but denies productive cough. She had a slight headache in the morning which has since resolved. She denies fever.    MEDICAL HISTORY:  She has a history of allergies but states current symptoms feel different from her usual allergy symptoms.    PMH, PSH, SH, FH reviewed with patient.      ROS:  General: -fever, -chills, -fatigue, -weight gain, -weight loss  Eyes: -vision changes, -redness, -discharge  ENT: -ear pain, +nasal congestion, +sore throat, +frequent sneezing, +nasal discharge, +runny nose, +hoarseness  Cardiovascular: -chest pain, -palpitations, -lower extremity edema  Respiratory: +cough, -shortness of breath  Gastrointestinal: -abdominal pain, -nausea, -vomiting, -diarrhea, -constipation, -blood in stool  Genitourinary: -dysuria, -hematuria, -frequency  Musculoskeletal: -joint pain, -muscle pain  Skin: -rash, -lesion  Neurological: +headache, -dizziness, -numbness, -tingling  Psychiatric: -anxiety, -depression, -sleep difficulty  Head: +head pain         Exam:  Physical Exam    General: No acute distress. Well-developed. Well-nourished.  Eyes: EOMI. Sclerae anicteric.  HENT: Normocephalic. Atraumatic. Nares patent. Moist oral mucosa. Congestion. Nasal congestion present. Minimal amount of redness, no pus, no exudate, no vesicles.  Ears: Bilateral TMs clear. Bilateral EACs clear.  Cardiovascular: Regular rate. Regular rhythm. No murmurs. No  rubs. No gallops. Normal S1, S2.  Respiratory: Normal respiratory effort. Clear to auscultation bilaterally. No rales. No rhonchi. No wheezing.  Abdomen: Soft. Non-tender. Non-distended. Normoactive bowel sounds.  Musculoskeletal: No  obvious deformity.  Extremities: No lower extremity edema.  Neurological: Alert & oriented x3. No slurred speech. Normal gait.  Psychiatric: Normal mood. Normal affect. Good insight. Good judgment.  Skin: Warm. Dry. No rash.         Assessment/Plan:  COVID         Assessment & Plan    IMPRESSION:  - Suspect viral etiology, likely a common cold.  - Explained that if COVID-19 positive, illness would need to run its course without specific treatment.    ACUTE UPPER RESPIRATORY INFECTION:  - Monitored patient's symptoms which started Monday with coughing, sneezing, hoarseness, pharyngeal burning and pruritus, and slight cephalgia that resolved.  - Examination showed minimal pharyngeal erythema with no purulence, exudate, or vesicles, and clear lungs.  - Assessment indicates viral upper respiratory infection rather than seasonal allergies, noting recent influenza and COVID-19 cases in the community.  - Strep test completed (negative); influenza test pending (lack of reagent); COVID-19 test positive.  - For symptomatic treatment, prescribed intranasal corticosteroid spray for nasal symptoms, advised saline gargles for pharyngitis, and recommended patient's preferred antitussive and decongestant medication for other symptoms.  - Discussed infection control measures, mask wearing for 10 days..    FOLLOW-UP:  - Return as needed based on symptom progression or test results.          Visit today included increased complexity associated with the care of the episodic problem  addressed and managing the longitudinal care of the patient due to the serious and/or complex managed problem(s) .      No follow-ups on file.    This note was generated with the assistance of ambient listening technology.  Verbal consent was obtained by the patient and accompanying visitor(s) for the recording of patient appointment to facilitate this note. I attest to having reviewed and edited the generated note for accuracy, though some syntax or spelling errors may persist. Please contact the author of this note for any clarification.

## 2025-05-16 ENCOUNTER — PATIENT MESSAGE (OUTPATIENT)
Dept: INTERNAL MEDICINE | Facility: CLINIC | Age: 20
End: 2025-05-16
Payer: COMMERCIAL

## 2025-05-16 NOTE — LETTER
May 16, 2025      The [address]  [address] [address] [facility]  [place] [address]  Phone: [phone]  Fax: [phone]       Patient: Natalia Sue   YOB: 2005  Date of Visit: 05/14/2025    To Whom It May Concern:    Dung Sue  was at [facility] on 05/14/2025. Please excuse her for the following dates 05/14/2025-05/18/2025. The patient may return to work/school on 05/19/2025 with no restrictions. If you have any questions or concerns, or if I can be of further assistance, please do not hesitate to contact me.    Sincerely,    Tennille Zapata LPN

## 2025-05-16 NOTE — TELEPHONE ENCOUNTER
Hey can excuse be placed for patient to return to work on Monday for Positive Covid 19 results on 05/14/2025? Please advise. Thanks.

## 2025-05-23 ENCOUNTER — OFFICE VISIT (OUTPATIENT)
Dept: INTERNAL MEDICINE | Facility: CLINIC | Age: 20
End: 2025-05-23
Payer: COMMERCIAL

## 2025-05-23 ENCOUNTER — OFFICE VISIT (OUTPATIENT)
Dept: OTOLARYNGOLOGY | Facility: CLINIC | Age: 20
End: 2025-05-23
Payer: COMMERCIAL

## 2025-05-23 VITALS — BODY MASS INDEX: 31.15 KG/M2 | HEIGHT: 57 IN | WEIGHT: 144.38 LBS

## 2025-05-23 VITALS
BODY MASS INDEX: 30.68 KG/M2 | HEIGHT: 57 IN | DIASTOLIC BLOOD PRESSURE: 82 MMHG | OXYGEN SATURATION: 100 % | WEIGHT: 142.19 LBS | TEMPERATURE: 98 F | HEART RATE: 92 BPM | SYSTOLIC BLOOD PRESSURE: 124 MMHG

## 2025-05-23 DIAGNOSIS — J30.1 SEASONAL ALLERGIC RHINITIS DUE TO POLLEN: Chronic | ICD-10-CM

## 2025-05-23 DIAGNOSIS — H61.23 BILATERAL IMPACTED CERUMEN: Primary | ICD-10-CM

## 2025-05-23 DIAGNOSIS — F41.9 RECURRENT MILD MAJOR DEPRESSIVE DISORDER WITH ANXIETY: Chronic | ICD-10-CM

## 2025-05-23 DIAGNOSIS — R21 RASH: Chronic | ICD-10-CM

## 2025-05-23 DIAGNOSIS — F33.0 RECURRENT MILD MAJOR DEPRESSIVE DISORDER WITH ANXIETY: Chronic | ICD-10-CM

## 2025-05-23 DIAGNOSIS — G44.009 ATYPICAL CLUSTER HEADACHE: Primary | ICD-10-CM

## 2025-05-23 PROBLEM — L21.9 SEBORRHEIC DERMATITIS OF SCALP: Status: ACTIVE | Noted: 2025-05-23

## 2025-05-23 PROCEDURE — 99999 PR PBB SHADOW E&M-EST. PATIENT-LVL III: CPT | Mod: PBBFAC,,,

## 2025-05-23 PROCEDURE — 99999 PR PBB SHADOW E&M-EST. PATIENT-LVL III: CPT | Mod: PBBFAC,,, | Performed by: FAMILY MEDICINE

## 2025-05-23 NOTE — ASSESSMENT & PLAN NOTE
Doing well with therapeutic lifestyle changes off medicines.  PLAN: Continue present treatment plan.

## 2025-05-23 NOTE — PROGRESS NOTES
Subjective:   Cerumen impactions     Patient ID: Natalia Sue is a 20 y.o. female.    Chief Complaint:  Excessive ear wax     Natalia Sue is a 20 y.o. female here to see me today for evaluation of a possible wax impaction in bilateral ears, AS>AD.  She has complaints of hearing loss in the affected ears, but denies pain or drainage.  This has not been an issue in the past.  The patient has not been using any sort of ear drop to soften the wax. Admits to occasional Q tip usage.     HPI  Review of Systems   HENT: Positive for hearing loss. Negative for ear discharge, ear pain and tinnitus.        Objective:     Physical Exam   HENT:   Right Ear: External ear and ear canal normal.   Left Ear: External ear and ear canal normal. Decreased hearing is noted.   Bilateral complete cerumen impactions, removal described below       Procedure Note    CHIEF COMPLAINT:  Cerumen Impaction    Description:  The patient was seated in an exam chair.  An ear speculum was placed in the right EAC and was examined under the microscope.  Suction and/or loop curettes were used to remove a moderate cerumen impaction.  The tympanic membrane was visualized and was normal in appearance.  The procedure was repeated on the left side in a similar fashion.  The TM was intact and normal on this side as well.  The patient tolerated the procedure well.           Assessment:     1. Bilateral impacted cerumen        Plan:     1.  Cerumen impaction:  Removed today without difficulty.  I would recommend the use of a wax softening drop, either over the counter Debrox or mineral oil, on a weekly basis.  I also instructed the patient to avoid Qtips.

## 2025-05-23 NOTE — PROGRESS NOTES
OFFICE VISIT 5/23/25  1:00 PM CDT    CHIEF COMPLAINT: Physical Exam    Atypical cluster headache: She reported new right-sided frontal headaches that started at 4:00 AM on the day of the visit, with five episodes, each lasting one minute and rated as 2/10 in intensity. The episodes lacked typical cluster headache features such as eye tearing or nasal discharge, and there were no associated symptoms like vision changes, sinus pain, or fever. Her headache was evaluated as atypical and likely self-limiting. I reviewed her current medications, including celecoxib (Celebrex), gabapentin (Neurontin), tizanidine (Zanaflex), and methocarbamol (Robaxin), which she uses as needed for pain control. No acute changes in vital signs were noted, and her blood pressure was stable. Given the short duration and mild severity of her headaches, no immediate pharmacologic intervention was recommended. I instructed her to monitor symptoms for any changes in character or severity.     Recurrent mild major depressive disorder with anxiety: She continues to do well with therapeutic lifestyle changes and is off medication for this diagnosis. I assessed her current status based on her report and continued the present treatment plan without changes. Her bupropion (Wellbutrin XL) was discontinued.     Seasonal allergic rhinitis due to pollen: She has been doing well using fluticasone propionate (Flonase) as needed, but this medication was discontinued at this visit, along with other allergy medications including loratadine (Claritin), azelastine (Astelin), montelukast (Singulair), and oxymetazoline (Afrin). She reported she was not taking montelukast or Flonase at the time of the visit. There are no new prescribed or over-the-counter medications for allergic rhinitis in the updated medication plan.     Rash of scalp: She reported a persistent dry scalp unresponsive to prior medicated shampoos, with new patches also noted around her lips and  ears. Prior topical and medicated agents for her dermatosis, including ciclopirox (Loprox), ketoconazole (Nizoral) shampoo, triamcinolone acetonide (Kenalog) ointment, and hydrocortisone cream, were discontinued. Clobetasol (Olux) foam was initiated and prescribed to be applied topically twice daily. She has a dermatology appointment scheduled for July 17, 2025, and again on August 27, 2025. She was instructed to submit an e-visit for her rash including a minimum of three photos at times when her scalp is at its worst, and to take photos of other affected areas for documentation.    Follow up if symptoms worsen or fail to improve. She has a dermatology appointment scheduled with Braulio Giraldo MD on July 17, 2025, and again on August 27, 2025.      1. Atypical cluster headache    2. Recurrent mild major depressive disorder with anxiety  Assessment & Plan:  Doing well with therapeutic lifestyle changes off medicines.  PLAN: Continue present treatment plan.      3. Seasonal allergic rhinitis due to pollen  Assessment & Plan:  Doing well using Flonase OTC PRN.      4. Rash of Scalp       PRESCRIPTION DRUG MANAGEMENT  Natalia was instructed to continue current prescription medications as mentioned above. No refills required at this time.     Unless specified otherwise, chronic conditions are represented as and appear to be compensated/controlled and stable.  Today's visit involved the intricate management of episodic problem(s) and the ongoing care for the patient's serious or complex condition(s) listed above, reflecting the inherent complexity of providing longitudinal, comprehensive evaluation and management as the central hub for the patient's primary care services.    Except as noted herein, ROS is otherwise negative.    Vitals:    05/23/25 1308   BP: 124/82   BP Location: Left arm   Patient Position: Sitting   Pulse: 92   Temp: 97.7 °F (36.5 °C)   TempSrc: Tympanic   SpO2: 100%   Weight: 64.5 kg (142 lb 3.2 oz)  "  Height: 4' 9" (1.448 m)   Physical Exam  Vitals reviewed.   Constitutional:       General: She is not in acute distress.     Appearance: Normal appearance. She is not ill-appearing, toxic-appearing or diaphoretic.   HENT:      Head: Normocephalic and atraumatic.      Right Ear: Tympanic membrane, ear canal and external ear normal.      Left Ear: Tympanic membrane, ear canal and external ear normal.   Eyes:      General: Lids are normal. No scleral icterus.     Extraocular Movements: Extraocular movements intact.      Conjunctiva/sclera: Conjunctivae normal.      Pupils: Pupils are equal, round, and reactive to light.      Funduscopic exam:     Right eye: No hemorrhage or papilledema.         Left eye: No hemorrhage or papilledema.   Neck:      Vascular: No carotid bruit.   Cardiovascular:      Rate and Rhythm: Normal rate and regular rhythm.      Heart sounds: Normal heart sounds.   Pulmonary:      Effort: Pulmonary effort is normal.      Breath sounds: Normal breath sounds.   Abdominal:      General: Bowel sounds are normal. There is no distension.      Palpations: Abdomen is soft. There is no mass.      Tenderness: There is no abdominal tenderness.   Musculoskeletal:         General: No tenderness.      Cervical back: No muscular tenderness.   Lymphadenopathy:      Cervical: No cervical adenopathy.   Skin:     General: Skin is warm and dry.      Coloration: Skin is not jaundiced.      Findings: Rash present.   Neurological:      General: No focal deficit present.      Mental Status: She is alert and oriented to person, place, and time.      Cranial Nerves: No cranial nerve deficit.      Gait: Gait normal.   Psychiatric:         Mood and Affect: Mood normal.         Behavior: Behavior normal.         Judgment: Judgment normal.       Documentation entered by me for this encounter may have been done in part using speech-recognition technology. Although I have made an effort to ensure accuracy, "sound like" errors " may exist and should be interpreted in context.    Follow up if symptoms worsen or fail to improve.

## 2025-05-28 DIAGNOSIS — L23.4 ALLERGIC CONTACT DERMATITIS DUE TO DYES: Primary | ICD-10-CM

## 2025-05-28 RX ORDER — CLOBETASOL PROPIONATE 0.5 MG/G
AEROSOL, FOAM TOPICAL 2 TIMES DAILY
Qty: 100 G | Refills: 1 | Status: SHIPPED | OUTPATIENT
Start: 2025-05-28

## 2025-06-05 ENCOUNTER — HOSPITAL ENCOUNTER (OUTPATIENT)
Dept: RADIOLOGY | Facility: HOSPITAL | Age: 20
Discharge: HOME | End: 2025-06-05
Attending: PHYSICIAN ASSISTANT
Payer: COMMERCIAL

## 2025-06-05 DIAGNOSIS — M54.9 DORSALGIA, UNSPECIFIED: ICD-10-CM

## 2025-06-05 PROCEDURE — 72148 MRI LUMBAR SPINE W/O DYE: CPT | Mod: TC

## 2025-06-05 PROCEDURE — 72148 MRI LUMBAR SPINE W/O DYE: CPT | Mod: 26,,, | Performed by: RADIOLOGY

## 2025-06-12 ENCOUNTER — OFFICE VISIT (OUTPATIENT)
Dept: NEUROSURGERY | Facility: CLINIC | Age: 20
End: 2025-06-12
Payer: COMMERCIAL

## 2025-06-12 DIAGNOSIS — M51.34 DEGENERATIVE DISC DISEASE, THORACIC: ICD-10-CM

## 2025-06-12 DIAGNOSIS — M54.10 BACK PAIN WITH RADICULOPATHY: ICD-10-CM

## 2025-06-12 DIAGNOSIS — M47.814 SPONDYLOSIS WITHOUT MYELOPATHY OR RADICULOPATHY, THORACIC REGION: Primary | ICD-10-CM

## 2025-06-12 DIAGNOSIS — M50.30 DEGENERATIVE DISC DISEASE, CERVICAL: ICD-10-CM

## 2025-06-12 PROCEDURE — 99999 PR PBB SHADOW E&M-EST. PATIENT-LVL II: CPT | Mod: PBBFAC,,, | Performed by: NEUROLOGICAL SURGERY

## 2025-06-12 PROCEDURE — 99213 OFFICE O/P EST LOW 20 MIN: CPT | Mod: S$GLB,,, | Performed by: NEUROLOGICAL SURGERY

## 2025-06-12 NOTE — PROGRESS NOTES
"Subjective:      Patient ID: Natalia Sue is a 20 y.o. female.    Chief Complaint: Cervical radiculopathy (Pt reports for f/u of cervical spine ; Pt has MR cervical and lumbar for review )    Patient with cervical radiculopathy here for MRI cervical and Lumbar for review, along with an EMG.   Continues to have N/T in L UE with L hand weakness, but no loss of strength in Chaz UE.   Meds = Gabapentin, Robaxin, Tizanidine.      Previous HPI:  Patient is 19 yo F with acute exacerbation of chronic neck pain -with neck pain 10/10 with radiculopathy into LUE.  Patient presents today for follow-up with MRIs C-spine which shows cord signal change at C2-3.  She does report Some dexterity issues intermittently. Pain and numness Throughout entire LUE with associated left hand weakness. Had incident last night with severe pain in the neck while washing dishes. Completed steroid pack. Did not start muscle relaxer.  Denies dropping objects or gait instability  Denies BB dysfunction  Negative hoffmans BL, 5/5 strength throughout NEGRITA. Good strength of BL interosseus M.         PREVIOUS HPI:  Patient is a 20-year-old female who presents to clinic today for evaluation acute flare-up neck and back pain.  States she has had Neck and back pain since middle school. A few weeks ago noted neck pain into left upper extremity. Pt. Does get massages eevry month. Was told her body has "knots". 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 Back aching and arms aching. Left hand / arm pain/ hand grasp spasming.   She did see Dr. Marina in the past and was given Robaxin.  States this was not beneficial.  Pt. Completed PT, but did not help her. Pt. Reports was in healthy back program. Twice a week at [facility]. Monday morning and Friday afternoon - at [facility] location. Did do dry needling PT in [facility] pt.   Pt does crack neck and back for relief, but reports " starts aching again.   Had virtual visit and was prescribed Celebrex and zanaflex  Will send in medrol pack   Pertinent positive and negative ROS documented above in HPI, all other systems reviewed and found to be negative.   Denies focal weakness or acute bb dysfunction        Physical Exam:  Nursing note and vitals reviewed.     Constitutional: She appears well-nourished. She is not diaphoretic. No distress.      Eyes: Pupils are equal, round, and reactive to light. EOM are normal.      Cardiovascular: Normal rate and regular rhythm.      Psych/Behavior: She is alert. She is oriented to person, place, and time. She has a normal mood and affect.      Musculoskeletal:        Back: Range of motion is limited. There is tenderness. Muscle strength is 5/5.       Right Lower Extremities: Range of motion is full. There is no tenderness. Muscle strength is 5/5. Tone is normal.        Left Lower Extremities: There is no tenderness. Muscle strength is 5/5. Tone is normal.      Neurological:        Sensory: There is no sensory deficit in the trunk. There is no sensory deficit in the extremities.        Cranial nerves: Cranial nerve(s) II, III, IV, V, VI, VII, VIII, IX, X, XI and XII are intact.      General     Nursing note and vitals reviewed.  Constitutional: She is oriented to person, place, and time. She appears well-nourished. No distress.   Eyes: EOM are normal. Pupils are equal, round, and reactive to light.   Cardiovascular:  Normal rate and regular rhythm.            Neurological: She is alert and oriented to person, place, and time.   Psychiatric: She has a normal mood and affect.      A/P:     Patient is a 20-year-old female with worsening neck pain with radiculopathy into left upper extremity she has tried conservative measures PT as well as muscle relaxers without any improvement of the last few months her neck pain is progressively worsening like to obtain an MRI cervical spine and will send in Medrol Dosepak  she already has NSAIDs Celebrex advised her to start taking this and the muscle relaxer at night I advised on signs and symptoms that prompt urgent medical attention patient expressed understanding and agreement with plan of care     I reviewed MRI C-spine with the patient I explained that there is disc bulge at C2-3 causing narrowing of the central spinal canal.  There is hyperintensity within the cord signifying myelomalacia I explained that we would need to obtain EMG nerve conduction study and I will have her follow up with Dr. Khalil to discuss next steps.         Gabapentin and muscle relaxer        Given the patient also has back pain I would like to obtain MRI L-spine     Follow up after imaging        Attestation:  I, Riya Diaz PA-C have obtained HPI, performed Physical Examination on the above patient, reviewed the pertinent labs, tests, imaging, other relevant data and recorded my findings in this clinic note.      Objective:       Physical Exam:  Nursing note and vitals reviewed.    Constitutional: She appears well-nourished. She is not diaphoretic. No distress.     Eyes: Pupils are equal, round, and reactive to light. EOM are normal.     Cardiovascular: Normal rate and regular rhythm.     Psych/Behavior: She is alert. She is oriented to person, place, and time. She has a normal mood and affect.     Musculoskeletal:        Neck: Range of motion is limited. There is tenderness. Muscle strength is 5/5.        Back: Range of motion is limited. There is tenderness. Muscle strength is 5/5.        Right Upper Extremities: There is no tenderness. Muscle strength is 5/5.        Left Upper Extremities: There is no tenderness. Muscle strength is 5/5.       Right Lower Extremities: Range of motion is full. There is no tenderness. Muscle strength is 5/5. Tone is normal.        Left Lower Extremities: There is no tenderness. Muscle strength is 5/5. Tone is normal.     Neurological:        Sensory: There is no  sensory deficit in the trunk. There is no sensory deficit in the extremities.        Cranial nerves: Cranial nerve(s) II, III, IV, V, VI, VII, VIII, IX, X, XI and XII are intact.     General    Nursing note and vitals reviewed.  Constitutional: She is oriented to person, place, and time. She appears well-nourished. No distress.   Eyes: EOM are normal. Pupils are equal, round, and reactive to light.   Cardiovascular:  Normal rate and regular rhythm.            Neurological: She is alert and oriented to person, place, and time.   Psychiatric: She has a normal mood and affect.             Ortho Exam      MRI Cervical   Impression:     1. Broad-based central disc protrusion at the C2-3 level with mild-to-moderate effacement of ventral cord and equivocal mild increased signal seen within the ventral cord at this level.  Mild myelopathic changes not excluded.  2. Remaining findings as discussed above.        Electronically signed by:Caden Russell DO  Date:                                            04/10/2025  Time:                                           08:47    MRI Lumbar  Impression:     Early minimal degenerative findings at L5-S1.     Possible subtle distal cord signal abnormality.  Correlate with thoracic MRI with and without contrast.     Electronically signed by:Johny Roberts MD  Date:                                            06/05/2025  Time:                                           14:54    EMG 4/17/2025  IMPRESSION  ABNORMAL study  2. 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 There was no evidence of a C4-T1 radiculopathy      Assessment:     1. Spondylosis without myelopathy or radiculopathy, thoracic region    2. Degenerative disc disease, cervical    3. Back pain with radiculopathy    4. Degenerative disc disease, thoracic      Plan:     Spondylosis without myelopathy or radiculopathy, thoracic region  -     MRI  Thoracic Spine W WO Cont; Future; Expected date: 06/12/2025    Degenerative disc disease, cervical    Back pain with radiculopathy    Degenerative disc disease, thoracic        Patient also with possible distal cord abnormality at T12   Per radiology recs I agree with ordering MR T spine with and without contrast for review  RTC after MR T spine completed       Thank you for the referral   Please call with any questions    See Khalil MD  Neurosurgery     Disclaimer: This note was prepared using a voice recognition system and is likely to have sound alike errors within the text.

## 2025-07-07 ENCOUNTER — HOSPITAL ENCOUNTER (OUTPATIENT)
Dept: RADIOLOGY | Facility: HOSPITAL | Age: 20
Discharge: HOME | End: 2025-07-07
Attending: NEUROLOGICAL SURGERY
Payer: COMMERCIAL

## 2025-07-07 DIAGNOSIS — M47.814 SPONDYLOSIS WITHOUT MYELOPATHY OR RADICULOPATHY, THORACIC REGION: ICD-10-CM

## 2025-07-07 PROCEDURE — 25500020 PHARM REV CODE 255: Performed by: NEUROLOGICAL SURGERY

## 2025-07-07 PROCEDURE — 72157 MRI CHEST SPINE W/O & W/DYE: CPT | Mod: 26,,, | Performed by: RADIOLOGY

## 2025-07-07 PROCEDURE — A9585 GADOBUTROL INJECTION: HCPCS | Performed by: NEUROLOGICAL SURGERY

## 2025-07-07 PROCEDURE — 72157 MRI CHEST SPINE W/O & W/DYE: CPT | Mod: TC

## 2025-07-07 RX ORDER — GADOBUTROL 604.72 MG/ML
6.5 INJECTION INTRAVENOUS
Status: COMPLETED | OUTPATIENT
Start: 2025-07-07 | End: 2025-07-07

## 2025-07-07 RX ADMIN — GADOBUTROL 6.5 ML: 604.72 INJECTION INTRAVENOUS at 02:07

## 2025-07-10 ENCOUNTER — OFFICE VISIT (OUTPATIENT)
Dept: NEUROSURGERY | Facility: CLINIC | Age: 20
End: 2025-07-10
Payer: COMMERCIAL

## 2025-07-10 VITALS
HEIGHT: 57 IN | DIASTOLIC BLOOD PRESSURE: 79 MMHG | HEART RATE: 88 BPM | SYSTOLIC BLOOD PRESSURE: 115 MMHG | WEIGHT: 146.19 LBS | BODY MASS INDEX: 31.54 KG/M2

## 2025-07-10 DIAGNOSIS — M54.10 BACK PAIN WITH RADICULOPATHY: Primary | ICD-10-CM

## 2025-07-10 DIAGNOSIS — M50.30 DEGENERATIVE DISC DISEASE, CERVICAL: ICD-10-CM

## 2025-07-10 DIAGNOSIS — G56.02 CARPAL TUNNEL SYNDROME OF LEFT WRIST: ICD-10-CM

## 2025-07-10 PROCEDURE — 99999 PR PBB SHADOW E&M-EST. PATIENT-LVL III: CPT | Mod: PBBFAC,,, | Performed by: NEUROLOGICAL SURGERY

## 2025-07-10 NOTE — PROGRESS NOTES
"Subjective:      Patient ID: Natalia Sue is a 20 y.o. female.    Chief Complaint: Mid-back Pain (Neck, shoulder, and back pain. )    Patient here with MRI Thoracic for review.   N/T in L UE has dissipated, now having pain and tightness in right wrist. Denies a loss of strength in Chaz UE.   Discontinued meds because they make her feel sleepy and she does not want this to affect work.   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 She will start to have low back pain that radiates in to left anterior thigh.        Previous HPI  Patient with cervical radiculopathy here for MRI cervical and Lumbar for review, along with an EMG.   Continues to have N/T in L UE with L hand weakness, but no loss of strength in Chaz UE.   Meds = Gabapentin, Robaxin, Tizanidine.      Previous HPI:  Patient is 19 yo F with acute exacerbation of chronic neck pain -with neck pain 10/10 with radiculopathy into LUE.  Patient presents today for follow-up with MRIs C-spine which shows cord signal change at C2-3.  She does report Some dexterity issues intermittently. Pain and numness Throughout entire LUE with associated left hand weakness. Had incident last night with severe pain in the neck while washing dishes. Completed steroid pack. Did not start muscle relaxer.  Denies dropping objects or gait instability  Denies BB dysfunction  Negative hoffmans BL, 5/5 strength throughout NEGRITA. Good strength of BL interosseus M.         PREVIOUS HPI:  Patient is a 20-year-old female who presents to clinic today for evaluation acute flare-up neck and back pain.  States she has had Neck and back pain since middle school. A few weeks ago noted neck pain into left upper extremity. Pt. Does get massages eevry month. Was told her body has "knots". 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 Back aching and arms aching. Left hand / arm pain/ hand grasp spasming.   She " did see Dr. Marina in the past and was given Robaxin.  States this was not beneficial.  Pt. Completed PT, but did not help her. Pt. Reports was in healthy back program. Twice a week at [facility]. Monday morning and Friday afternoon - at [facility] location. Did do dry needling PT in [facility] pt.   Pt does crack neck and back for relief, but reports starts aching again.   Had virtual visit and was prescribed Celebrex and zanaflex  Will send in medrol pack   Pertinent positive and negative ROS documented above in HPI, all other systems reviewed and found to be negative.   Denies focal weakness or acute bb dysfunction        Physical Exam:  Nursing note and vitals reviewed.     Constitutional: She appears well-nourished. She is not diaphoretic. No distress.      Eyes: Pupils are equal, round, and reactive to light. EOM are normal.      Cardiovascular: Normal rate and regular rhythm.      Psych/Behavior: She is alert. She is oriented to person, place, and time. She has a normal mood and affect.      Musculoskeletal:        Back: Range of motion is limited. There is tenderness. Muscle strength is 5/5.       Right Lower Extremities: Range of motion is full. There is no tenderness. Muscle strength is 5/5. Tone is normal.        Left Lower Extremities: There is no tenderness. Muscle strength is 5/5. Tone is normal.      Neurological:        Sensory: There is no sensory deficit in the trunk. There is no sensory deficit in the extremities.        Cranial nerves: Cranial nerve(s) II, III, IV, V, VI, VII, VIII, IX, X, XI and XII are intact.      General     Nursing note and vitals reviewed.  Constitutional: She is oriented to person, place, and time. She appears well-nourished. No distress.   Eyes: EOM are normal. Pupils are equal, round, and reactive to light.   Cardiovascular:  Normal rate and regular rhythm.            Neurological: She is alert and oriented to person, place, and time.   Psychiatric: She has a normal mood  and affect.      A/P:     Patient is a 20-year-old female with worsening neck pain with radiculopathy into left upper extremity she has tried conservative measures PT as well as muscle relaxers without any improvement of the last few months her neck pain is progressively worsening like to obtain an MRI cervical spine and will send in Medrol Dosepak she already has NSAIDs Celebrex advised her to start taking this and the muscle relaxer at night I advised on signs and symptoms that prompt urgent medical attention patient expressed understanding and agreement with plan of care     I reviewed MRI C-spine with the patient I explained that there is disc bulge at C2-3 causing narrowing of the central spinal canal.  There is hyperintensity within the cord signifying myelomalacia I explained that we would need to obtain EMG nerve conduction study and I will have her follow up with Dr. Khalil to discuss next steps.         Gabapentin and muscle relaxer        Given the patient also has back pain I would like to obtain MRI L-spine     Follow up after imaging        Attestation:  IRiya PA-C have obtained HPI, performed Physical Examination on the above patient, reviewed the pertinent labs, tests, imaging, other relevant data and recorded my findings in this clinic note.      Objective:       Physical Exam:  Nursing note and vitals reviewed.    Constitutional: She appears well-nourished. She is not diaphoretic. No distress.     Eyes: Pupils are equal, round, and reactive to light. EOM are normal.     Cardiovascular: Normal rate and regular rhythm.     Psych/Behavior: She is alert. She is oriented to person, place, and time. She has a normal mood and affect.     Musculoskeletal:        Neck: Range of motion is limited. There is tenderness. Muscle strength is 5/5.        Back: Range of motion is limited. There is tenderness. Muscle strength is 5/5.        Right Upper Extremities: There is no tenderness. Muscle  strength is 5/5.        Left Upper Extremities: There is no tenderness. Muscle strength is 5/5.       Right Lower Extremities: Range of motion is full. There is no tenderness. Muscle strength is 5/5. Tone is normal.        Left Lower Extremities: There is no tenderness. Muscle strength is 5/5. Tone is normal.     Neurological:        Sensory: There is no sensory deficit in the trunk. There is no sensory deficit in the extremities.        Cranial nerves: Cranial nerve(s) II, III, IV, V, VI, VII, VIII, IX, X, XI and XII are intact.     General    Nursing note and vitals reviewed.  Constitutional: She is oriented to person, place, and time. She appears well-nourished. No distress.   Eyes: EOM are normal. Pupils are equal, round, and reactive to light.   Cardiovascular:  Normal rate and regular rhythm.            Neurological: She is alert and oriented to person, place, and time.   Psychiatric: She has a normal mood and affect.             Ortho Exam      MRI Cervical   Impression:     1. Broad-based central disc protrusion at the C2-3 level with mild-to-moderate effacement of ventral cord and equivocal mild increased signal seen within the ventral cord at this level.  Mild myelopathic changes not excluded.  2. Remaining findings as discussed above.        Electronically signed by:Caden Russell DO  Date:                                            04/10/2025  Time:                                           08:47    MRI Lumbar  Impression:     Early minimal degenerative findings at L5-S1.     Possible subtle distal cord signal abnormality.  Correlate with thoracic MRI with and without contrast.     Electronically signed by:Johny Roberts MD  Date:                                            06/05/2025  Time:                                           14:54    EMG 4/17/2025  IMPRESSION  ABNORMAL study  2. There is electrodiagnostic evidence of a mild demyelinating median neuropathy (Carpal tunnel syndrome) across the  left wrist. There was no evidence of a C4-T1 radiculopathy      Assessment:     1. Back pain with radiculopathy    2. Degenerative disc disease, cervical    3. Carpal tunnel syndrome of left wrist        Plan:     Back pain with radiculopathy  -     Procedure Order to Pain Management; Future; Expected date: 07/10/2025    Degenerative disc disease, cervical    Carpal tunnel syndrome of left wrist        Pt states sill having L lower extremity symptoms   Hand and arm symptoms have improved but still back pain keepoing her from sleep   Can't take meds at work   Discussed MARTHA at L L5-S1   No surgical r\ecs at this time           Thank you for the referral   Please call with any questions    See Khalil MD  Neurosurgery     Disclaimer: This note was prepared using a voice recognition system and is likely to have sound alike errors within the text.

## 2025-07-11 DIAGNOSIS — M54.16 LUMBAR RADICULOPATHY: Primary | ICD-10-CM

## 2025-07-14 ENCOUNTER — PATIENT MESSAGE (OUTPATIENT)
Dept: PAIN MEDICINE | Facility: CLINIC | Age: 20
End: 2025-07-14
Payer: COMMERCIAL

## 2025-07-18 ENCOUNTER — TELEPHONE (OUTPATIENT)
Dept: PAIN MEDICINE | Facility: CLINIC | Age: 20
End: 2025-07-18
Payer: COMMERCIAL

## 2025-07-18 NOTE — TELEPHONE ENCOUNTER
Tried returning the patient call but no answer, I left the patient a voicemail letting her know that she can return to work the day after her procedure/injection.    Teresita GARSIA ([facility])

## 2025-07-18 NOTE — TELEPHONE ENCOUNTER
Copied from CRM #[id number]. Topic: General Inquiry - Patient Advice  >> Jul 18, 2025  9:40 AM Melissa wrote:  .Type:  Patient Requesting Call    Who Called:Natalia Seu  Does the patient know what this is regarding?:Patient requesting a call back in regards to her upcoming surgery and needing to know when she will be able to return to work after surgery.  Would the patient rather a call back or a response via [employer] [facility]? My [employer] or call  Best Call Back Number:.[phone]  Additional Information:

## 2025-07-25 NOTE — PRE-PROCEDURE INSTRUCTIONS
Spoke with patient regarding procedure scheduled on 7.31     Arrival time 0900     Has patient been sick with fever or on antibiotics within the last 7 days? No     Does the patient have any open wounds, sores or rashes? No     Does the patient have any recent fractures? no     Has patient received a vaccination within the last 7 days? No     Received the COVID vaccination? yes     Has the patient stopped all medications as directed? na     Does patient have a pacemaker, defibrillator, or implantable stimulator? No     Does the patient have a ride to and from procedure and someone reliable to remain with patient?  gf     Is the patient diabetic? no      Does the patient have sleep apnea? Or use O2 at home? no     Is the patient receiving sedation? Yes      Is the patient instructed to remain NPO beginning at midnight the night before their procedure? yes     Procedure location confirmed with patient? Yes     Covid- Denies signs/symptoms. Instructed to notify PAT/MD if any changes.

## 2025-07-31 ENCOUNTER — HOSPITAL ENCOUNTER (OUTPATIENT)
Facility: HOSPITAL | Age: 20
Discharge: HOME | End: 2025-07-31
Attending: PHYSICAL MEDICINE & REHABILITATION | Admitting: PHYSICAL MEDICINE & REHABILITATION
Payer: COMMERCIAL

## 2025-07-31 VITALS
HEIGHT: 57 IN | TEMPERATURE: 98 F | RESPIRATION RATE: 18 BRPM | HEART RATE: 78 BPM | SYSTOLIC BLOOD PRESSURE: 112 MMHG | BODY MASS INDEX: 31.67 KG/M2 | WEIGHT: 146.81 LBS | OXYGEN SATURATION: 98 % | DIASTOLIC BLOOD PRESSURE: 60 MMHG

## 2025-07-31 DIAGNOSIS — G89.4 CHRONIC PAIN SYNDROME: ICD-10-CM

## 2025-07-31 DIAGNOSIS — M54.16 LUMBAR RADICULOPATHY: Primary | ICD-10-CM

## 2025-07-31 LAB
B-HCG UR QL: NEGATIVE
CTP QC/QA: YES

## 2025-07-31 PROCEDURE — 63600175 PHARM REV CODE 636 W HCPCS: Performed by: PHYSICAL MEDICINE & REHABILITATION

## 2025-07-31 PROCEDURE — 64483 NJX AA&/STRD TFRM EPI L/S 1: CPT | Mod: LT,,, | Performed by: PHYSICAL MEDICINE & REHABILITATION

## 2025-07-31 PROCEDURE — 81025 URINE PREGNANCY TEST: CPT | Performed by: PHYSICAL MEDICINE & REHABILITATION

## 2025-07-31 PROCEDURE — 64483 NJX AA&/STRD TFRM EPI L/S 1: CPT | Mod: LT | Performed by: PHYSICAL MEDICINE & REHABILITATION

## 2025-07-31 PROCEDURE — 25500020 PHARM REV CODE 255: Performed by: PHYSICAL MEDICINE & REHABILITATION

## 2025-07-31 RX ORDER — FENTANYL CITRATE 50 UG/ML
INJECTION, SOLUTION INTRAMUSCULAR; INTRAVENOUS
Status: DISCONTINUED | OUTPATIENT
Start: 2025-07-31 | End: 2025-07-31 | Stop reason: HOSPADM

## 2025-07-31 RX ORDER — BUPIVACAINE HYDROCHLORIDE 2.5 MG/ML
INJECTION, SOLUTION EPIDURAL; INFILTRATION; INTRACAUDAL; PERINEURAL
Status: DISCONTINUED | OUTPATIENT
Start: 2025-07-31 | End: 2025-07-31 | Stop reason: HOSPADM

## 2025-07-31 RX ORDER — METHYLPREDNISOLONE ACETATE 40 MG/ML
INJECTION, SUSPENSION INTRA-ARTICULAR; INTRALESIONAL; INTRAMUSCULAR; SOFT TISSUE
Status: DISCONTINUED | OUTPATIENT
Start: 2025-07-31 | End: 2025-07-31 | Stop reason: HOSPADM

## 2025-07-31 RX ORDER — ONDANSETRON HYDROCHLORIDE 2 MG/ML
4 INJECTION, SOLUTION INTRAVENOUS ONCE AS NEEDED
Status: DISCONTINUED | OUTPATIENT
Start: 2025-07-31 | End: 2025-07-31 | Stop reason: HOSPADM

## 2025-07-31 RX ORDER — MIDAZOLAM HYDROCHLORIDE 1 MG/ML
INJECTION INTRAMUSCULAR; INTRAVENOUS
Status: DISCONTINUED | OUTPATIENT
Start: 2025-07-31 | End: 2025-07-31 | Stop reason: HOSPADM

## 2025-07-31 NOTE — DISCHARGE INSTRUCTIONS

## 2025-07-31 NOTE — H&P
"HPI  Patient presenting for Procedure(s) (LRB):  Left L5-S1 TFESI (Left)         No health changes since previous encounter    Past Medical History:   Diagnosis Date    Bronchitis     Carpal tunnel syndrome of left wrist     Low iron     Pre-diabetes      Past Surgical History:   Procedure Laterality Date    WISDOM TOOTH EXTRACTION       Review of patient's allergies indicates:   Allergen Reactions    Ibuprofen Swelling    Naproxen Swelling     Eye swelling    Nsaids (non-steroidal anti-inflammatory drug) Swelling    Aspirin-acetaminophen-caffeine     Excedrin ib      Eye swelling    Penicillins Hives, Itching, Rash and Swelling        Medications Ordered Prior to Encounter[1]     PMHx, PSHx, Allergies, Medications reviewed in epic    ROS negative except pain complaints in HPI    OBJECTIVE:    /80 (BP Location: Right arm, Patient Position: Sitting)   Pulse 83   Temp 98.2 °F (36.8 °C) (Temporal)   Resp 16   Ht 4' 9" (1.448 m)   Wt 66.6 kg (146 lb 13.2 oz)   LMP  (Within Months)   SpO2 99%   Breastfeeding No   BMI 31.77 kg/m²     PHYSICAL EXAMINATION:    GENERAL: Well appearing, in no acute distress, alert and oriented x3.  PSYCH:  Mood and affect appropriate.  SKIN: Skin color, texture, turgor normal, no rashes or lesions which will impact the procedure.  CV: RRR with palpation of the radial artery.  PULM: No evidence of respiratory difficulty, symmetric chest rise. Clear to auscultation.  NEURO: Cranial nerves grossly intact.    Plan:    Proceed with procedure as planned Procedure(s) (LRB):  Left L5-S1 TFESI (Left)    Seven Marina MD  07/31/2025                 [1]   No current facility-administered medications on file prior to encounter.     Current Outpatient Medications on File Prior to Encounter   Medication Sig Dispense Refill    celecoxib (CELEBREX) 200 MG capsule Take 1 capsule (200 mg total) by mouth once daily. (Patient not taking: Reported on 7/10/2025) 30 capsule 1    clobetasoL (OLUX) " 0.05 % Foam Apply topically 2 (two) times daily. (Patient not taking: Reported on 7/10/2025) 100 g 1    ferrous sulfate (FEOSOL) 325 mg (65 mg iron) Tab tablet Take 1 tablet (325 mg total) by mouth every Mon, Wed, Fri. (Patient not taking: Reported on 5/14/2025) 48 tablet 3    gabapentin (NEURONTIN) 300 MG capsule Take 1 capsule (300 mg total) by mouth every evening. (Patient not taking: Reported on 7/10/2025) 30 capsule 11    methocarbamoL (ROBAXIN) 750 MG Tab Take 1 tablet (750 mg total) by mouth 3 (three) times daily as needed (muscle spasms). (Patient not taking: Reported on 7/10/2025) 60 tablet 0    tiZANidine (ZANAFLEX) 4 MG tablet Take 1 tablet (4 mg total) by mouth every 8 (eight) hours as needed (neck pain). (Patient not taking: Reported on 7/10/2025) 30 tablet 1

## 2025-07-31 NOTE — OP NOTE
INFORMED CONSENT: The procedure, risks, benefits and options were discussed with patient. There are no contraindications to the procedure. The patient expressed understanding and agreed to proceed. The personnel performing the procedure was discussed.    07/31/2025    Surgeon: Seven Marina MD    Assistants: None    Sedation: Conscious sedation provided by M.D    The patient was monitored with continuous pulse oximetry, EKG, and intermittent blood pressure monitors, immediately prior to administration of sedation.  The patient was hemodynamically stable throughout the entire process was responsive to voice, and breathing spontaneously.  Supplemental O2 was provided at 2L/min via nasal cannula.  Patient was comfortable for the duration of the procedure.     There was a total of 2mg IV Midazolam and 50mcg Fentanyl titrated for the procedure    Total sedation time was >10minutes and <20minutes      PROCEDURE:  Left  L5-S1  TRANSFORAMINAL EPIDURAL STEROID INJECTION    Pre Procedure diagnosis:   Left  L5  Lumbar radiculopathy [M54.16]    Post-Procedure diagnosis:   same    Complications: None    Specimens: None    Sedation: None    DESCRIPTION OF PROCEDURE: The patient was brought to the procedure room. IV access was obtained prior to the procedure. The patient was positioned prone on the fluoroscopy table. Continuous hemodynamic monitoring was initiated including blood pressure, EKG, and pulse oximetry. . The skin was prepped with chlorhexidine and draped in a sterile fashion. Skin anesthesia was achieved using a total of 5mL of lidocaine over the respective injection site.     The Left L5-S1  transforaminal space was identified with fluoroscopy in the  AP, oblique, and lateral views.  A 22 gauge spinal quinke needle was then advanced into the area of the trans foraminal space with confirmation of proper needle position using AP, oblique, and lateral fluoroscopic views. Once the needle tip was in the area of the  transforaminal space, and there was no blood, CSF or paraesthesias,  1.5 mL of Omnipaque 300mg/ml was injected.  Fluoroscopic imaging in the AP and lateral views revealed a clear outline of the spinal nerve with proximal spread of agent through the neural foramen into the epidural space. A total combination of 1 mL of Bupivicaine 0.25% and 40 mg depo medrol was injected with displacement of the contrast dye confirming that the medication went into the area of the transforaminal space. A sterile dressing was applied.   Patient tolerated the procedure well.    Patient was taken back to the recovery room for further observation.     The patient was discharged to home in stable condition

## 2025-07-31 NOTE — DISCHARGE SUMMARY
Discharge Note  Short Stay      SUMMARY     Admit Date: 7/31/2025    Attending Physician: Seven Marina MD        Discharge Physician: Seven Marina MD        Discharge Date: 7/31/2025 10:07 AM    Procedure(s) (LRB):  Left L5-S1 TFESI (Left)    Final Diagnosis: Lumbar radiculopathy [M54.16]    Disposition: Home or self care    Patient Instructions:   Current Discharge Medication List        STOP taking these medications       celecoxib (CELEBREX) 200 MG capsule Comments:   Reason for Stopping:         clobetasoL (OLUX) 0.05 % Foam Comments:   Reason for Stopping:         ferrous sulfate (FEOSOL) 325 mg (65 mg iron) Tab tablet Comments:   Reason for Stopping:         gabapentin (NEURONTIN) 300 MG capsule Comments:   Reason for Stopping:         methocarbamoL (ROBAXIN) 750 MG Tab Comments:   Reason for Stopping:         tiZANidine (ZANAFLEX) 4 MG tablet Comments:   Reason for Stopping:                   Discharge Diagnosis: Lumbar radiculopathy [M54.16]  Condition on Discharge: Stable with no complications to procedure   Diet on Discharge: Same as before.  Activity: as per instruction sheet.  Discharge to: Home with a responsible adult.  Follow up: 2-4 weeks       Please call the office at [phone] if you experience any weakness or loss of sensation, fever > 101.5, pain uncontrolled with oral medications, persistent nausea/vomiting/or diarrhea, redness or drainage from the incisions, or any other worrisome concerns. If physician on call was not reached or could not communicate with our office for any reason please go to the nearest emergency department

## 2025-08-15 ENCOUNTER — HOSPITAL ENCOUNTER (OUTPATIENT)
Dept: RADIOLOGY | Facility: HOSPITAL | Age: 20
Discharge: HOME | End: 2025-08-15
Attending: PODIATRIST
Payer: COMMERCIAL

## 2025-08-15 DIAGNOSIS — M79.672 LEFT FOOT PAIN: Primary | ICD-10-CM

## 2025-08-15 DIAGNOSIS — M79.672 LEFT FOOT PAIN: ICD-10-CM

## 2025-08-15 PROCEDURE — 73660 X-RAY EXAM OF TOE(S): CPT | Mod: TC,LT

## 2025-08-15 PROCEDURE — 73610 X-RAY EXAM OF ANKLE: CPT | Mod: TC,LT

## 2025-08-15 PROCEDURE — 73660 X-RAY EXAM OF TOE(S): CPT | Mod: 26,LT,, | Performed by: RADIOLOGY

## 2025-08-15 PROCEDURE — 73610 X-RAY EXAM OF ANKLE: CPT | Mod: 26,LT,, | Performed by: RADIOLOGY

## 2025-08-26 ENCOUNTER — OFFICE VISIT (OUTPATIENT)
Dept: GASTROENTEROLOGY | Facility: CLINIC | Age: 20
End: 2025-08-26
Payer: COMMERCIAL

## 2025-08-26 ENCOUNTER — HOSPITAL ENCOUNTER (OUTPATIENT)
Dept: RADIOLOGY | Facility: HOSPITAL | Age: 20
Discharge: HOME | End: 2025-08-26
Attending: INTERNAL MEDICINE
Payer: COMMERCIAL

## 2025-08-26 VITALS
HEIGHT: 57 IN | SYSTOLIC BLOOD PRESSURE: 126 MMHG | WEIGHT: 149.06 LBS | BODY MASS INDEX: 32.16 KG/M2 | HEART RATE: 109 BPM | DIASTOLIC BLOOD PRESSURE: 75 MMHG

## 2025-08-26 DIAGNOSIS — R14.0 ABDOMINAL BLOATING: ICD-10-CM

## 2025-08-26 DIAGNOSIS — K59.04 CHRONIC IDIOPATHIC CONSTIPATION: ICD-10-CM

## 2025-08-26 DIAGNOSIS — R14.0 ABDOMINAL BLOATING: Primary | ICD-10-CM

## 2025-08-26 DIAGNOSIS — K57.90 DIVERTICULOSIS: ICD-10-CM

## 2025-08-26 DIAGNOSIS — R10.9 ABDOMINAL PAIN, UNSPECIFIED ABDOMINAL LOCATION: ICD-10-CM

## 2025-08-26 PROCEDURE — 74018 RADEX ABDOMEN 1 VIEW: CPT | Mod: 26,,, | Performed by: RADIOLOGY

## 2025-08-26 PROCEDURE — 3078F DIAST BP <80 MM HG: CPT | Mod: CPTII,S$GLB,, | Performed by: INTERNAL MEDICINE

## 2025-08-26 PROCEDURE — 3008F BODY MASS INDEX DOCD: CPT | Mod: CPTII,S$GLB,, | Performed by: INTERNAL MEDICINE

## 2025-08-26 PROCEDURE — 99214 OFFICE O/P EST MOD 30 MIN: CPT | Mod: S$GLB,,, | Performed by: INTERNAL MEDICINE

## 2025-08-26 PROCEDURE — 3074F SYST BP LT 130 MM HG: CPT | Mod: CPTII,S$GLB,, | Performed by: INTERNAL MEDICINE

## 2025-08-26 PROCEDURE — 99999 PR PBB SHADOW E&M-EST. PATIENT-LVL IV: CPT | Mod: PBBFAC,,, | Performed by: INTERNAL MEDICINE

## 2025-08-26 PROCEDURE — 74018 RADEX ABDOMEN 1 VIEW: CPT | Mod: TC

## 2025-08-26 PROCEDURE — 1159F MED LIST DOCD IN RCRD: CPT | Mod: CPTII,S$GLB,, | Performed by: INTERNAL MEDICINE

## 2025-08-26 RX ORDER — GABAPENTIN 300 MG/1
300 CAPSULE ORAL 3 TIMES DAILY
COMMUNITY

## 2025-08-26 RX ORDER — LORATADINE 10 MG/1
TABLET ORAL
COMMUNITY

## 2025-08-26 RX ORDER — CLOBETASOL PROPIONATE 0.5 MG/G
AEROSOL, FOAM TOPICAL 2 TIMES DAILY
COMMUNITY

## 2025-08-26 RX ORDER — METHOCARBAMOL 500 MG/1
TABLET, FILM COATED ORAL 4 TIMES DAILY
COMMUNITY

## 2025-08-26 RX ORDER — MIRTAZAPINE 15 MG/1
7.5-15 TABLET, FILM COATED ORAL NIGHTLY PRN
COMMUNITY
Start: 2025-07-15

## 2025-08-26 RX ORDER — TIZANIDINE HYDROCHLORIDE 4 MG/1
CAPSULE, GELATIN COATED ORAL
COMMUNITY

## 2025-08-27 ENCOUNTER — OFFICE VISIT (OUTPATIENT)
Dept: DERMATOLOGY | Facility: CLINIC | Age: 20
End: 2025-08-27
Payer: COMMERCIAL

## 2025-08-27 ENCOUNTER — HOSPITAL ENCOUNTER (OUTPATIENT)
Dept: PREADMISSION TESTING | Facility: HOSPITAL | Age: 20
Discharge: HOME | End: 2025-08-27
Attending: INTERNAL MEDICINE
Payer: COMMERCIAL

## 2025-08-27 DIAGNOSIS — L21.9 SEBORRHEIC DERMATITIS: Primary | ICD-10-CM

## 2025-08-27 PROCEDURE — 99999 PR PBB SHADOW E&M-EST. PATIENT-LVL III: CPT | Mod: PBBFAC,,, | Performed by: STUDENT IN AN ORGANIZED HEALTH CARE EDUCATION/TRAINING PROGRAM

## 2025-08-27 PROCEDURE — G2211 COMPLEX E/M VISIT ADD ON: HCPCS | Mod: S$GLB,,, | Performed by: STUDENT IN AN ORGANIZED HEALTH CARE EDUCATION/TRAINING PROGRAM

## 2025-08-27 PROCEDURE — 1160F RVW MEDS BY RX/DR IN RCRD: CPT | Mod: CPTII,S$GLB,, | Performed by: STUDENT IN AN ORGANIZED HEALTH CARE EDUCATION/TRAINING PROGRAM

## 2025-08-27 PROCEDURE — 1159F MED LIST DOCD IN RCRD: CPT | Mod: CPTII,S$GLB,, | Performed by: STUDENT IN AN ORGANIZED HEALTH CARE EDUCATION/TRAINING PROGRAM

## 2025-08-27 PROCEDURE — 99204 OFFICE O/P NEW MOD 45 MIN: CPT | Mod: S$GLB,,, | Performed by: STUDENT IN AN ORGANIZED HEALTH CARE EDUCATION/TRAINING PROGRAM

## 2025-08-27 RX ORDER — CLOBETASOL PROPIONATE 0.5 MG/G
AEROSOL, FOAM TOPICAL DAILY
Qty: 50 G | Refills: 2 | Status: SHIPPED | OUTPATIENT
Start: 2025-08-27

## 2025-08-27 RX ORDER — DESONIDE 0.5 MG/G
CREAM TOPICAL 2 TIMES DAILY
Qty: 60 G | Refills: 1 | Status: SHIPPED | OUTPATIENT
Start: 2025-08-27

## 2025-08-27 RX ORDER — FLUCONAZOLE 200 MG/1
200 TABLET ORAL WEEKLY
Qty: 4 TABLET | Refills: 0 | Status: SHIPPED | OUTPATIENT
Start: 2025-08-27 | End: 2025-09-26

## 2025-08-27 RX ORDER — KETOCONAZOLE 20 MG/ML
SHAMPOO, SUSPENSION TOPICAL WEEKLY
Qty: 120 ML | Refills: 6 | Status: SHIPPED | OUTPATIENT
Start: 2025-08-27

## 2025-08-28 ENCOUNTER — HOSPITAL ENCOUNTER (OUTPATIENT)
Dept: PREADMISSION TESTING | Facility: HOSPITAL | Age: 20
Discharge: HOME | End: 2025-08-28
Attending: INTERNAL MEDICINE
Payer: COMMERCIAL

## 2025-08-28 DIAGNOSIS — R10.9 ABDOMINAL PAIN, UNSPECIFIED ABDOMINAL LOCATION: ICD-10-CM

## 2025-08-28 DIAGNOSIS — R14.0 ABDOMINAL BLOATING: Primary | ICD-10-CM

## 2025-09-05 ENCOUNTER — OFFICE VISIT (OUTPATIENT)
Dept: INTERNAL MEDICINE | Facility: CLINIC | Age: 20
End: 2025-09-05
Payer: COMMERCIAL

## 2025-09-05 VITALS
OXYGEN SATURATION: 98 % | TEMPERATURE: 98 F | BODY MASS INDEX: 31.82 KG/M2 | HEIGHT: 57 IN | HEART RATE: 90 BPM | DIASTOLIC BLOOD PRESSURE: 76 MMHG | WEIGHT: 147.5 LBS | SYSTOLIC BLOOD PRESSURE: 118 MMHG

## 2025-09-05 DIAGNOSIS — R73.03 PREDIABETES: Chronic | ICD-10-CM

## 2025-09-05 DIAGNOSIS — E66.09 CLASS 1 OBESITY DUE TO EXCESS CALORIES WITH SERIOUS COMORBIDITY AND BODY MASS INDEX (BMI) OF 31.0 TO 31.9 IN ADULT: Chronic | ICD-10-CM

## 2025-09-05 DIAGNOSIS — Z13.29 SCREENING FOR THYROID DISORDER: ICD-10-CM

## 2025-09-05 DIAGNOSIS — M54.50 CHRONIC MIDLINE LOW BACK PAIN WITHOUT SCIATICA: ICD-10-CM

## 2025-09-05 DIAGNOSIS — Z23 NEED FOR TETANUS BOOSTER: ICD-10-CM

## 2025-09-05 DIAGNOSIS — L03.032 PARONYCHIA OF GREAT TOE OF LEFT FOOT: ICD-10-CM

## 2025-09-05 DIAGNOSIS — Z00.00 PREVENTATIVE HEALTH CARE: Primary | ICD-10-CM

## 2025-09-05 DIAGNOSIS — Z13.220 ENCOUNTER FOR SCREENING FOR LIPID DISORDER: ICD-10-CM

## 2025-09-05 DIAGNOSIS — E66.811 CLASS 1 OBESITY DUE TO EXCESS CALORIES WITH SERIOUS COMORBIDITY AND BODY MASS INDEX (BMI) OF 31.0 TO 31.9 IN ADULT: Chronic | ICD-10-CM

## 2025-09-05 DIAGNOSIS — M54.2 NECK PAIN: ICD-10-CM

## 2025-09-05 DIAGNOSIS — G89.29 CHRONIC MIDLINE LOW BACK PAIN WITHOUT SCIATICA: ICD-10-CM

## 2025-09-05 PROCEDURE — 99999 PR PBB SHADOW E&M-EST. PATIENT-LVL IV: CPT | Mod: PBBFAC,,, | Performed by: NURSE PRACTITIONER

## 2025-09-05 RX ORDER — CEPHALEXIN 500 MG/1
500 CAPSULE ORAL EVERY 12 HOURS
Qty: 14 CAPSULE | Refills: 0 | Status: SHIPPED | OUTPATIENT
Start: 2025-09-05 | End: 2025-09-12